# Patient Record
Sex: FEMALE | Race: OTHER | Employment: OTHER | URBAN - METROPOLITAN AREA
[De-identification: names, ages, dates, MRNs, and addresses within clinical notes are randomized per-mention and may not be internally consistent; named-entity substitution may affect disease eponyms.]

---

## 2017-03-18 RX ORDER — LEVOTHYROXINE SODIUM 0.05 MG/1
TABLET ORAL
Qty: 30 TABLET | Refills: 5 | Status: SHIPPED | OUTPATIENT
Start: 2017-03-18 | End: 2017-10-26

## 2017-04-26 ENCOUNTER — APPOINTMENT (OUTPATIENT)
Dept: HEMATOLOGY/ONCOLOGY | Facility: HOSPITAL | Age: 52
End: 2017-04-26
Attending: INTERNAL MEDICINE
Payer: COMMERCIAL

## 2017-04-27 ENCOUNTER — OFFICE VISIT (OUTPATIENT)
Dept: HEMATOLOGY/ONCOLOGY | Facility: HOSPITAL | Age: 52
End: 2017-04-27
Attending: INTERNAL MEDICINE
Payer: COMMERCIAL

## 2017-04-27 VITALS
OXYGEN SATURATION: 98 % | DIASTOLIC BLOOD PRESSURE: 75 MMHG | SYSTOLIC BLOOD PRESSURE: 133 MMHG | HEIGHT: 65 IN | RESPIRATION RATE: 20 BRPM | WEIGHT: 273.81 LBS | HEART RATE: 59 BPM | TEMPERATURE: 98 F | BODY MASS INDEX: 45.62 KG/M2

## 2017-04-27 DIAGNOSIS — D50.9 IRON DEFICIENCY ANEMIA, UNSPECIFIED IRON DEFICIENCY ANEMIA TYPE: ICD-10-CM

## 2017-04-27 PROCEDURE — 99214 OFFICE O/P EST MOD 30 MIN: CPT | Performed by: INTERNAL MEDICINE

## 2017-04-27 NOTE — PROGRESS NOTES
Cancer Center Progress Note    Patient Name: Sd Vicente   YOB: 1965   Medical Record Number: [id number]   CSN: [id number]   Attending Physician: Mulugeta Christian M.D.    Referring Physician: Danielle Briones MD      Date of Visit: 4/27/2017 supple. Lymphatics: There is no palpable lymphadenopathy   Chest: Clear to auscultation. Heart: Regular rate and rhythm. Abdomen: Soft, non tender with good bowel sounds. No hepatosplenomegaly. No palpable mass.   Extremities: Pedal pulses are present

## 2017-10-26 RX ORDER — LEVOTHYROXINE SODIUM 0.05 MG/1
TABLET ORAL
Qty: 30 TABLET | Refills: 1 | Status: SHIPPED | OUTPATIENT
Start: 2017-10-26 | End: 2018-02-08

## 2017-11-01 NOTE — TELEPHONE ENCOUNTER
Patient no longer uses [facility] as a pharmacy and was filled on the October 26th by doctor. Refilling it at [facility].

## 2018-02-08 ENCOUNTER — OFFICE VISIT (OUTPATIENT)
Dept: INTERNAL MEDICINE CLINIC | Facility: CLINIC | Age: 53
End: 2018-02-08

## 2018-02-08 VITALS
WEIGHT: 293 LBS | TEMPERATURE: 99 F | SYSTOLIC BLOOD PRESSURE: 130 MMHG | DIASTOLIC BLOOD PRESSURE: 78 MMHG | HEIGHT: 65 IN | RESPIRATION RATE: 14 BRPM | HEART RATE: 67 BPM | OXYGEN SATURATION: 98 % | BODY MASS INDEX: 48.82 KG/M2

## 2018-02-08 DIAGNOSIS — D50.9 IRON DEFICIENCY ANEMIA, UNSPECIFIED IRON DEFICIENCY ANEMIA TYPE: ICD-10-CM

## 2018-02-08 DIAGNOSIS — E66.01 MORBID OBESITY WITH BMI OF 50.0-59.9, ADULT (HCC): ICD-10-CM

## 2018-02-08 DIAGNOSIS — E03.9 HYPOTHYROIDISM, UNSPECIFIED TYPE: Primary | ICD-10-CM

## 2018-02-08 DIAGNOSIS — Z12.31 SCREENING MAMMOGRAM, ENCOUNTER FOR: ICD-10-CM

## 2018-02-08 DIAGNOSIS — G47.33 OSA (OBSTRUCTIVE SLEEP APNEA): ICD-10-CM

## 2018-02-08 DIAGNOSIS — R73.03 PREDIABETES: ICD-10-CM

## 2018-02-08 PROCEDURE — 99214 OFFICE O/P EST MOD 30 MIN: CPT | Performed by: INTERNAL MEDICINE

## 2018-02-08 RX ORDER — PHENTERMINE HYDROCHLORIDE 37.5 MG/1
37.5 TABLET ORAL
Qty: 30 TABLET | Refills: 0 | Status: SHIPPED | OUTPATIENT
Start: 2018-02-08 | End: 2018-03-09

## 2018-02-08 RX ORDER — LEVOTHYROXINE SODIUM 0.05 MG/1
TABLET ORAL
Qty: 90 TABLET | Refills: 1 | Status: SHIPPED | OUTPATIENT
Start: 2018-02-08 | End: 2018-08-30

## 2018-02-08 NOTE — PROGRESS NOTES
Patient presents with: Other: multiple medical concerns      HPI: The pt presents today for eval of multiple concerns as follows:    1. Hypothyroidism - Stable. Due for updated TFTs. She's compliant w/ prescription medication. No new issues.   2. Sienna Bound oz  04/27/17 : 273 lb 12.8 oz  11/16/16 : 221 lb 6.4 oz  11/09/16 : 230 lb 9.6 oz  11/02/16 : 234 lb  10/27/16 : 227 lb  Gen - A&Ox3, NAD, morbidly obese  HEENT - PERRL, EOMI, OP is clear; TMs clear B  Neck - supple, no JVD, no thyromegaly  Lungs - CTAB  C mouth every morning before breakfast.           Imaging & Consults:  [facility] WM 2D+3D SCREENING BILAT (CPT=77067/00258)

## 2018-02-12 ENCOUNTER — HOSPITAL ENCOUNTER (OUTPATIENT)
Dept: MAMMOGRAPHY | Age: 53
Discharge: HOME | End: 2018-02-12
Attending: INTERNAL MEDICINE
Payer: COMMERCIAL

## 2018-02-12 ENCOUNTER — LAB ENCOUNTER (OUTPATIENT)
Dept: LAB | Age: 53
End: 2018-02-12
Attending: INTERNAL MEDICINE
Payer: COMMERCIAL

## 2018-02-12 DIAGNOSIS — R73.03 PREDIABETES: ICD-10-CM

## 2018-02-12 DIAGNOSIS — D50.9 IRON DEFICIENCY ANEMIA, UNSPECIFIED IRON DEFICIENCY ANEMIA TYPE: ICD-10-CM

## 2018-02-12 DIAGNOSIS — E66.01 MORBID OBESITY WITH BMI OF 50.0-59.9, ADULT (HCC): ICD-10-CM

## 2018-02-12 DIAGNOSIS — Z12.31 SCREENING MAMMOGRAM, ENCOUNTER FOR: ICD-10-CM

## 2018-02-12 DIAGNOSIS — E03.9 HYPOTHYROIDISM, UNSPECIFIED TYPE: ICD-10-CM

## 2018-02-12 LAB
25-HYDROXYVITAMIN D (TOTAL): 21.6 NG/ML (ref 30–100)
ALBUMIN SERPL-MCNC: 3.4 G/DL (ref 3.5–4.8)
ALP LIVER SERPL-CCNC: 113 U/L (ref 41–108)
ALT SERPL-CCNC: 19 U/L (ref 14–54)
AST SERPL-CCNC: 14 U/L (ref 15–41)
BASOPHILS # BLD AUTO: 0.04 X10(3) UL (ref 0–0.1)
BASOPHILS NFR BLD AUTO: 0.4 %
BILIRUB SERPL-MCNC: 0.8 MG/DL (ref 0.1–2)
BUN BLD-MCNC: 11 MG/DL (ref 8–20)
CALCIUM BLD-MCNC: 9.2 MG/DL (ref 8.3–10.3)
CHLORIDE: 103 MMOL/L (ref 101–111)
CHOLEST SMN-MCNC: 221 MG/DL (ref ?–200)
CO2: 26 MMOL/L (ref 22–32)
CREAT BLD-MCNC: 0.66 MG/DL (ref 0.55–1.02)
DEPRECATED HBV CORE AB SER IA-ACNC: 30 NG/ML (ref 10–291)
EOSINOPHIL # BLD AUTO: 0.28 X10(3) UL (ref 0–0.3)
EOSINOPHIL NFR BLD AUTO: 2.9 %
ERYTHROCYTE [DISTWIDTH] IN BLOOD BY AUTOMATED COUNT: 13.9 % (ref 11.5–16)
EST. AVERAGE GLUCOSE BLD GHB EST-MCNC: 174 MG/DL (ref 68–126)
FOLATE (FOLIC ACID), SERUM: 23.3 NG/ML (ref 8.7–24)
FREE T4: 1.2 NG/DL (ref 0.9–1.8)
GLUCOSE BLD-MCNC: 144 MG/DL (ref 70–99)
HAV AB SERPL IA-ACNC: 718 PG/ML (ref 193–986)
HBA1C MFR BLD HPLC: 7.7 % (ref ?–5.7)
HCT VFR BLD AUTO: 42.8 % (ref 34–50)
HDLC SERPL-MCNC: 64 MG/DL (ref 45–?)
HDLC SERPL: 3.45 {RATIO} (ref ?–4.44)
HGB BLD-MCNC: 13.9 G/DL (ref 12–16)
IMMATURE GRANULOCYTE COUNT: 0.02 X10(3) UL (ref 0–1)
IMMATURE GRANULOCYTE RATIO %: 0.2 %
IRON SATURATION: 23 % (ref 13–45)
IRON: 92 UG/DL (ref 28–170)
LDLC SERPL CALC-MCNC: 141 MG/DL (ref ?–130)
LYMPHOCYTES # BLD AUTO: 2.07 X10(3) UL (ref 0.9–4)
LYMPHOCYTES NFR BLD AUTO: 21.3 %
M PROTEIN MFR SERPL ELPH: 7.8 G/DL (ref 6.1–8.3)
MCH RBC QN AUTO: 31.2 PG (ref 27–33.2)
MCHC RBC AUTO-ENTMCNC: 32.5 G/DL (ref 31–37)
MCV RBC AUTO: 96 FL (ref 81–100)
MONOCYTES # BLD AUTO: 0.64 X10(3) UL (ref 0.1–1)
MONOCYTES NFR BLD AUTO: 6.6 %
NEUTROPHIL ABS PRELIM: 6.66 X10 (3) UL (ref 1.3–6.7)
NEUTROPHILS # BLD AUTO: 6.66 X10(3) UL (ref 1.3–6.7)
NEUTROPHILS NFR BLD AUTO: 68.6 %
NONHDLC SERPL-MCNC: 157 MG/DL (ref ?–130)
PLATELET # BLD AUTO: 320 10(3)UL (ref 150–450)
POTASSIUM SERPL-SCNC: 3.9 MMOL/L (ref 3.6–5.1)
RBC # BLD AUTO: 4.46 X10(6)UL (ref 3.8–5.1)
RED CELL DISTRIBUTION WIDTH-SD: 49.3 FL (ref 35.1–46.3)
SODIUM SERPL-SCNC: 136 MMOL/L (ref 136–144)
TOTAL IRON BINDING CAPACITY: 407 UG/DL (ref 298–536)
TRANSFERRIN: 273 MG/DL (ref 200–360)
TRIGL SERPL-MCNC: 82 MG/DL (ref ?–150)
TSI SER-ACNC: 4.84 MIU/ML (ref 0.35–5.5)
VLDLC SERPL CALC-MCNC: 16 MG/DL (ref 5–40)
WBC # BLD AUTO: 9.7 X10(3) UL (ref 4–13)

## 2018-02-12 PROCEDURE — 82607 VITAMIN B-12: CPT | Performed by: INTERNAL MEDICINE

## 2018-02-12 PROCEDURE — 83550 IRON BINDING TEST: CPT | Performed by: INTERNAL MEDICINE

## 2018-02-12 PROCEDURE — 82728 ASSAY OF FERRITIN: CPT | Performed by: INTERNAL MEDICINE

## 2018-02-12 PROCEDURE — 80050 GENERAL HEALTH PANEL: CPT | Performed by: INTERNAL MEDICINE

## 2018-02-12 PROCEDURE — 82746 ASSAY OF FOLIC ACID SERUM: CPT | Performed by: INTERNAL MEDICINE

## 2018-02-12 PROCEDURE — 83540 ASSAY OF IRON: CPT | Performed by: INTERNAL MEDICINE

## 2018-02-12 PROCEDURE — 36415 COLL VENOUS BLD VENIPUNCTURE: CPT | Performed by: INTERNAL MEDICINE

## 2018-02-12 PROCEDURE — 82306 VITAMIN D 25 HYDROXY: CPT | Performed by: INTERNAL MEDICINE

## 2018-02-12 PROCEDURE — 80061 LIPID PANEL: CPT | Performed by: INTERNAL MEDICINE

## 2018-02-12 PROCEDURE — 84439 ASSAY OF FREE THYROXINE: CPT | Performed by: INTERNAL MEDICINE

## 2018-02-12 PROCEDURE — 83036 HEMOGLOBIN GLYCOSYLATED A1C: CPT | Performed by: INTERNAL MEDICINE

## 2018-02-12 PROCEDURE — 77067 SCR MAMMO BI INCL CAD: CPT | Performed by: INTERNAL MEDICINE

## 2018-02-12 PROCEDURE — 77063 BREAST TOMOSYNTHESIS BI: CPT | Performed by: INTERNAL MEDICINE

## 2018-02-14 DIAGNOSIS — E11.65 UNCONTROLLED TYPE 2 DIABETES MELLITUS WITH HYPERGLYCEMIA, WITHOUT LONG-TERM CURRENT USE OF INSULIN (HCC): Primary | ICD-10-CM

## 2018-02-14 DIAGNOSIS — E78.00 HYPERCHOLESTEROLEMIA: ICD-10-CM

## 2018-02-14 PROBLEM — E55.9 VITAMIN D INSUFFICIENCY: Status: ACTIVE | Noted: 2018-02-14

## 2018-02-14 RX ORDER — ATORVASTATIN CALCIUM 40 MG/1
40 TABLET, FILM COATED ORAL NIGHTLY
Qty: 30 TABLET | Refills: 5 | Status: SHIPPED | OUTPATIENT
Start: 2018-02-14 | End: 2018-08-13

## 2018-02-14 NOTE — PROGRESS NOTES
New scripts sent to pharmacy today:    1. Metformin 1000 mg BID for dx of uncontrolled DM2 w/ hyperglycemia and w/o insulin. 2. Atorvastatin 40 mg daily for dx of hypercholesterolemia. Stef Emery.  Wen Skaggs MD  Diplomate, [place] Board of Internal Medicine

## 2018-02-28 ENCOUNTER — TELEPHONE (OUTPATIENT)
Dept: INTERNAL MEDICINE CLINIC | Facility: CLINIC | Age: 53
End: 2018-02-28

## 2018-03-09 ENCOUNTER — OFFICE VISIT (OUTPATIENT)
Dept: INTERNAL MEDICINE CLINIC | Facility: CLINIC | Age: 53
End: 2018-03-09

## 2018-03-09 VITALS
HEIGHT: 65 IN | WEIGHT: 293 LBS | HEART RATE: 78 BPM | DIASTOLIC BLOOD PRESSURE: 82 MMHG | TEMPERATURE: 98 F | SYSTOLIC BLOOD PRESSURE: 128 MMHG | BODY MASS INDEX: 48.82 KG/M2 | RESPIRATION RATE: 16 BRPM

## 2018-03-09 DIAGNOSIS — E66.01 MORBID OBESITY WITH BMI OF 50.0-59.9, ADULT (HCC): Primary | ICD-10-CM

## 2018-03-09 DIAGNOSIS — Z51.81 THERAPEUTIC DRUG MONITORING: ICD-10-CM

## 2018-03-09 PROCEDURE — 99213 OFFICE O/P EST LOW 20 MIN: CPT | Performed by: INTERNAL MEDICINE

## 2018-03-09 RX ORDER — PHENTERMINE HYDROCHLORIDE 37.5 MG/1
37.5 TABLET ORAL
Qty: 30 TABLET | Refills: 0 | Status: SHIPPED | OUTPATIENT
Start: 2018-03-09 | End: 2018-04-14

## 2018-03-09 RX ORDER — BUPROPION HYDROCHLORIDE 150 MG/1
150 TABLET, EXTENDED RELEASE ORAL 2 TIMES DAILY
Qty: 60 TABLET | Refills: 0 | Status: SHIPPED | OUTPATIENT
Start: 2018-03-09 | End: 2018-04-14

## 2018-03-09 NOTE — PROGRESS NOTES
Patient presents with:  Weight Check: 1 month follow up        HPI: The pt presents today for physician-supervised medical weight loss programming and assessment for the diagnosis of overweight and/or obesity status.   Has been on FDA-approved prescription mood/affect      A/P:  Morbid obesity with bmi of 50.0-59.9, adult (hcc)  (primary encounter diagnosis)  Therapeutic drug monitoring    1. Continue FDA-approved prescription medication for the treatment of obesity. 2. Prescription medication refill given.

## 2018-04-14 DIAGNOSIS — E66.01 MORBID OBESITY WITH BMI OF 50.0-59.9, ADULT (HCC): ICD-10-CM

## 2018-04-14 DIAGNOSIS — Z51.81 THERAPEUTIC DRUG MONITORING: ICD-10-CM

## 2018-04-16 RX ORDER — PHENTERMINE HYDROCHLORIDE 37.5 MG/1
TABLET ORAL
Qty: 30 TABLET | Refills: 0 | Status: SHIPPED | OUTPATIENT
Start: 2018-04-16 | End: 2018-06-04

## 2018-04-16 RX ORDER — BUPROPION HYDROCHLORIDE 150 MG/1
TABLET, EXTENDED RELEASE ORAL
Qty: 60 TABLET | Refills: 0 | Status: SHIPPED | OUTPATIENT
Start: 2018-04-16 | End: 2018-06-04

## 2018-04-16 NOTE — TELEPHONE ENCOUNTER
Refill requested: Bupropion + Phentermine     Failed protocol - No protocol     Last refill:Phentermine 3/9/18 #30 NR + Bupropion 3/9/18 #60 NR    Relevant Labs: NA  Last OV / RTC advised: 3/9/18 RTC in 1 month  Appt Scheduled: Yes  Your appointments     D

## 2018-04-17 ENCOUNTER — TELEPHONE (OUTPATIENT)
Dept: INTERNAL MEDICINE CLINIC | Facility: CLINIC | Age: 53
End: 2018-04-17

## 2018-04-26 ENCOUNTER — OFFICE VISIT (OUTPATIENT)
Dept: INTERNAL MEDICINE CLINIC | Facility: CLINIC | Age: 53
End: 2018-04-26

## 2018-04-26 VITALS
WEIGHT: 293 LBS | BODY MASS INDEX: 48.82 KG/M2 | HEART RATE: 80 BPM | RESPIRATION RATE: 20 BRPM | HEIGHT: 65 IN | SYSTOLIC BLOOD PRESSURE: 122 MMHG | DIASTOLIC BLOOD PRESSURE: 84 MMHG | TEMPERATURE: 98 F

## 2018-04-26 DIAGNOSIS — E66.01 MORBID OBESITY WITH BMI OF 50.0-59.9, ADULT (HCC): Primary | ICD-10-CM

## 2018-04-26 DIAGNOSIS — Z51.81 THERAPEUTIC DRUG MONITORING: ICD-10-CM

## 2018-04-26 PROCEDURE — 99213 OFFICE O/P EST LOW 20 MIN: CPT | Performed by: INTERNAL MEDICINE

## 2018-04-26 RX ORDER — PHENTERMINE HYDROCHLORIDE 37.5 MG/1
TABLET ORAL
Qty: 30 TABLET | Refills: 0 | Status: CANCELLED | OUTPATIENT
Start: 2018-04-26

## 2018-04-26 NOTE — PROGRESS NOTES
Patient presents with:  Weight Check: 1-month f/u MD-supervised medical weight loss programming       HPI: The pt presents today for physician-supervised medical weight loss programming and assessment for the diagnosis of overweight and/or obesity status. 90 tablet, Rfl: 1  •  ferrous sulfate 325 (65 FE) MG Oral Tab EC, Take 325 mg by mouth daily with breakfast., Disp: , Rfl:     Smoking status: Never Smoker                                                              Smokeless tobacco: Never Used

## 2018-05-17 DIAGNOSIS — E11.65 UNCONTROLLED TYPE 2 DIABETES MELLITUS WITH HYPERGLYCEMIA, WITHOUT LONG-TERM CURRENT USE OF INSULIN (HCC): ICD-10-CM

## 2018-05-17 NOTE — TELEPHONE ENCOUNTER
Refill requested: Metformin 1000 mg     Failed protocol - Hemoglobin A1C #      Last refill: 2/14/18 #60 5R  Relevant Labs: HA1C on 2/12/18  Last OV / RTC advised: 4/26/18  RTC in 1 months  Appt Scheduled: yes  Your appointments     Date & Time Appointment

## 2018-05-21 NOTE — TELEPHONE ENCOUNTER
Refill requested: Phentermine 37.5 mg     Failedprotocol       Last refill: 4/16/18 #30 NR  Relevant Labs: NA   Last OV / RTC advised: 4/26/18 RTC in 1 month  Appt Scheduled: yes  Your appointments     Date & Time Appointment Department [facility])    Jun 04, 2018 10:30 AM CDT Exam - Established Patient with Juvencio Multani MD [address] [facility] [place] [facility])    Jul 26, 2018  9:30 AM CDT Exam - Established Patient with Juvencio Multani MD [address] [facility]        [address] [facility] [facility] [place] [address] [address] [phone]

## 2018-06-04 ENCOUNTER — OFFICE VISIT (OUTPATIENT)
Dept: INTERNAL MEDICINE CLINIC | Facility: CLINIC | Age: 53
End: 2018-06-04

## 2018-06-04 VITALS
RESPIRATION RATE: 16 BRPM | SYSTOLIC BLOOD PRESSURE: 124 MMHG | TEMPERATURE: 99 F | DIASTOLIC BLOOD PRESSURE: 80 MMHG | HEART RATE: 64 BPM | HEIGHT: 65 IN | WEIGHT: 293 LBS | BODY MASS INDEX: 48.82 KG/M2

## 2018-06-04 DIAGNOSIS — Z51.81 THERAPEUTIC DRUG MONITORING: ICD-10-CM

## 2018-06-04 DIAGNOSIS — E66.01 MORBID OBESITY WITH BMI OF 50.0-59.9, ADULT (HCC): Primary | ICD-10-CM

## 2018-06-04 PROCEDURE — 99213 OFFICE O/P EST LOW 20 MIN: CPT | Performed by: INTERNAL MEDICINE

## 2018-06-04 RX ORDER — BUPROPION HYDROCHLORIDE 150 MG/1
TABLET, EXTENDED RELEASE ORAL
Qty: 60 TABLET | Refills: 0 | Status: SHIPPED | OUTPATIENT
Start: 2018-06-04 | End: 2018-07-26

## 2018-06-04 RX ORDER — PHENTERMINE HYDROCHLORIDE 37.5 MG/1
TABLET ORAL
Qty: 30 TABLET | Refills: 0 | Status: SHIPPED | OUTPATIENT
Start: 2018-06-04 | End: 2018-07-26

## 2018-06-04 NOTE — PROGRESS NOTES
Patient presents with:  Weight Loss: follow up -6 lbs, feet pain when standing         HPI: The pt presents today for physician-supervised medical weight loss programming and assessment for the diagnosis of overweight and/or obesity status.   Has been on FD 90 tablet, Rfl: 1  •  ferrous sulfate 325 (65 FE) MG Oral Tab EC, Take 325 mg by mouth daily with breakfast., Disp: , Rfl:     Smoking status: Never Smoker                                                              Smokeless tobacco: Never Used

## 2018-07-26 ENCOUNTER — OFFICE VISIT (OUTPATIENT)
Dept: INTERNAL MEDICINE CLINIC | Facility: CLINIC | Age: 53
End: 2018-07-26
Payer: COMMERCIAL

## 2018-07-26 VITALS
DIASTOLIC BLOOD PRESSURE: 80 MMHG | WEIGHT: 292.25 LBS | TEMPERATURE: 98 F | BODY MASS INDEX: 48.69 KG/M2 | RESPIRATION RATE: 16 BRPM | HEIGHT: 65 IN | SYSTOLIC BLOOD PRESSURE: 133 MMHG | HEART RATE: 80 BPM

## 2018-07-26 DIAGNOSIS — Z51.81 THERAPEUTIC DRUG MONITORING: ICD-10-CM

## 2018-07-26 DIAGNOSIS — I83.813 VARICOSE VEINS OF BOTH LOWER EXTREMITIES WITH PAIN: ICD-10-CM

## 2018-07-26 DIAGNOSIS — E66.01 MORBID OBESITY WITH BMI OF 45.0-49.9, ADULT (HCC): Primary | ICD-10-CM

## 2018-07-26 PROCEDURE — 99213 OFFICE O/P EST LOW 20 MIN: CPT | Performed by: INTERNAL MEDICINE

## 2018-07-26 RX ORDER — PHENTERMINE HYDROCHLORIDE 37.5 MG/1
TABLET ORAL
Qty: 30 TABLET | Refills: 0 | Status: SHIPPED | OUTPATIENT
Start: 2018-07-26 | End: 2018-08-30

## 2018-07-26 RX ORDER — BUPROPION HYDROCHLORIDE 150 MG/1
TABLET, EXTENDED RELEASE ORAL
Qty: 60 TABLET | Refills: 0 | Status: SHIPPED | OUTPATIENT
Start: 2018-07-26 | End: 2018-08-30

## 2018-07-26 NOTE — PROGRESS NOTES
Patient presents with:  Weight Check       HPI: The pt presents today for physician-supervised medical weight loss programming and assessment for the diagnosis of overweight and/or obesity status.   Has been on FDA-approved prescription medication with Phen Disp: 90 tablet, Rfl: 1  •  ferrous sulfate 325 (65 FE) MG Oral Tab EC, Take 325 mg by mouth daily with breakfast., Disp: , Rfl:     Smoking status: Never Smoker                                                              Smokeless tobacco: Never Used LOSS           Imaging & Consults:  SURGERY - INTERNAL

## 2018-08-13 DIAGNOSIS — E78.00 HYPERCHOLESTEROLEMIA: ICD-10-CM

## 2018-08-13 RX ORDER — ATORVASTATIN CALCIUM 40 MG/1
40 TABLET, FILM COATED ORAL NIGHTLY
Qty: 90 TABLET | Refills: 1 | Status: SHIPPED | OUTPATIENT
Start: 2018-08-13 | End: 2019-01-22

## 2018-08-13 NOTE — TELEPHONE ENCOUNTER
Refill requested: Atorvastatin 40 mg     Passed protocol      Last refill: 2/14/18 #30 5R    Relevant Labs: Lipid 2/12/18   Last OV / RTC advised: 7/26/18 MG RTC 1 month    Appt Scheduled:  yes  Your appointments     Date & Time Appointment Department [phone]

## 2018-08-17 DIAGNOSIS — E66.01 MORBID OBESITY WITH BMI OF 50.0-59.9, ADULT (HCC): ICD-10-CM

## 2018-08-17 DIAGNOSIS — Z51.81 THERAPEUTIC DRUG MONITORING: ICD-10-CM

## 2018-08-17 RX ORDER — BUPROPION HYDROCHLORIDE 150 MG/1
TABLET, EXTENDED RELEASE ORAL
Qty: 60 TABLET | Refills: 0 | Status: SHIPPED | OUTPATIENT
Start: 2018-08-17 | End: 2018-08-30

## 2018-08-17 NOTE — TELEPHONE ENCOUNTER
Bupropion  mg 1 tab bid filled 7-26-18 60 with 0 refills     LOV 7-26-18     RTC 1 month     Next apt 8-30-18

## 2018-08-30 ENCOUNTER — TELEPHONE (OUTPATIENT)
Dept: INTERNAL MEDICINE CLINIC | Facility: CLINIC | Age: 53
End: 2018-08-30

## 2018-08-30 ENCOUNTER — OFFICE VISIT (OUTPATIENT)
Dept: INTERNAL MEDICINE CLINIC | Facility: CLINIC | Age: 53
End: 2018-08-30
Payer: COMMERCIAL

## 2018-08-30 VITALS
SYSTOLIC BLOOD PRESSURE: 130 MMHG | RESPIRATION RATE: 16 BRPM | HEART RATE: 78 BPM | DIASTOLIC BLOOD PRESSURE: 70 MMHG | WEIGHT: 293 LBS | TEMPERATURE: 98 F | BODY MASS INDEX: 48.23 KG/M2 | HEIGHT: 65.5 IN | OXYGEN SATURATION: 97 %

## 2018-08-30 DIAGNOSIS — E11.65 UNCONTROLLED TYPE 2 DIABETES MELLITUS WITH HYPERGLYCEMIA, WITHOUT LONG-TERM CURRENT USE OF INSULIN (HCC): ICD-10-CM

## 2018-08-30 DIAGNOSIS — E66.01 MORBID OBESITY WITH BMI OF 45.0-49.9, ADULT (HCC): Primary | ICD-10-CM

## 2018-08-30 DIAGNOSIS — Z51.81 THERAPEUTIC DRUG MONITORING: ICD-10-CM

## 2018-08-30 DIAGNOSIS — E03.9 HYPOTHYROIDISM, UNSPECIFIED TYPE: ICD-10-CM

## 2018-08-30 DIAGNOSIS — R60.9 PERIPHERAL EDEMA: ICD-10-CM

## 2018-08-30 PROCEDURE — 99214 OFFICE O/P EST MOD 30 MIN: CPT | Performed by: INTERNAL MEDICINE

## 2018-08-30 RX ORDER — BUPROPION HYDROCHLORIDE 150 MG/1
TABLET, EXTENDED RELEASE ORAL
Qty: 60 TABLET | Refills: 0 | Status: SHIPPED | OUTPATIENT
Start: 2018-08-30 | End: 2018-09-17

## 2018-08-30 RX ORDER — PHENTERMINE HYDROCHLORIDE 37.5 MG/1
TABLET ORAL
Qty: 30 TABLET | Refills: 0 | Status: SHIPPED | OUTPATIENT
Start: 2018-08-30 | End: 2018-09-17

## 2018-08-30 RX ORDER — LIRAGLUTIDE 6 MG/ML
3 INJECTION, SOLUTION SUBCUTANEOUS DAILY
Qty: 15 ML | Refills: 0 | Status: SHIPPED | OUTPATIENT
Start: 2018-08-30 | End: 2018-08-30

## 2018-08-30 RX ORDER — FUROSEMIDE 20 MG/1
20 TABLET ORAL DAILY
Qty: 7 TABLET | Refills: 0 | Status: SHIPPED | OUTPATIENT
Start: 2018-08-30 | End: 2018-09-17

## 2018-08-30 RX ORDER — LEVOTHYROXINE SODIUM 0.05 MG/1
TABLET ORAL
Qty: 90 TABLET | Refills: 1 | Status: SHIPPED | OUTPATIENT
Start: 2018-08-30 | End: 2019-01-22

## 2018-08-30 NOTE — TELEPHONE ENCOUNTER
Received PA request for Ozempic. JASMIN GUTIERREZ (Denise: [id number])  Your information has been submitted to [employer]. [employer] is reviewing the PA request and you will receive an electronic response.  You may check for the updated outcome later by sudha

## 2018-08-30 NOTE — PATIENT INSTRUCTIONS
Mel,    1. Vamos a seguir con la PHENTERMINE y la BUPROPION. 2. La medicina nueve se llama OZEMPIC y hay que tomarla tonny vez cada [place]. La fuerza es 0.25 mg.  3. Nos vemos en 1 mes.     Kind regards,  Dr. Claus Moise

## 2018-08-31 NOTE — PROGRESS NOTES
Patient presents with:  Weight Check: patient is here for 1 month follow up on weight      HPI: Corinne Michael presents today primarily for weight loss follow up.   She's been on Phentermine and Bupropion, but her weight loss has stopped since the last month, plus s weight loss as well). 2. Peripheral edema - Start 1-week trial of Furosemide. 3. Uncontrolled DM2 w/ hyperglycemia - Start Ozempic. Teaching given. Sample given. Continue Metformin as scheduled. 4. HypoTSH - Stable on prescription medication.   Refill

## 2018-09-17 ENCOUNTER — LAB ENCOUNTER (OUTPATIENT)
Dept: LAB | Age: 53
End: 2018-09-17
Attending: INTERNAL MEDICINE
Payer: COMMERCIAL

## 2018-09-17 ENCOUNTER — OFFICE VISIT (OUTPATIENT)
Dept: INTERNAL MEDICINE CLINIC | Facility: CLINIC | Age: 53
End: 2018-09-17
Payer: COMMERCIAL

## 2018-09-17 VITALS
HEIGHT: 65.5 IN | WEIGHT: 293 LBS | DIASTOLIC BLOOD PRESSURE: 80 MMHG | RESPIRATION RATE: 12 BRPM | BODY MASS INDEX: 48.23 KG/M2 | HEART RATE: 78 BPM | SYSTOLIC BLOOD PRESSURE: 140 MMHG | TEMPERATURE: 98 F

## 2018-09-17 DIAGNOSIS — Z00.00 ROUTINE GENERAL MEDICAL EXAMINATION AT A HEALTH CARE FACILITY: ICD-10-CM

## 2018-09-17 DIAGNOSIS — Z12.4 SCREENING FOR CERVICAL CANCER: ICD-10-CM

## 2018-09-17 DIAGNOSIS — Z00.00 ROUTINE GENERAL MEDICAL EXAMINATION AT A HEALTH CARE FACILITY: Primary | ICD-10-CM

## 2018-09-17 PROBLEM — I83.893 VARICOSE VEINS OF BOTH LOWER EXTREMITIES WITH COMPLICATIONS: Status: ACTIVE | Noted: 2018-09-17

## 2018-09-17 PROBLEM — IMO0001 CLASS 3 OBESITY DUE TO EXCESS CALORIES WITH BODY MASS INDEX (BMI) OF 45.0 TO 49.9 IN ADULT: Status: ACTIVE | Noted: 2018-02-08

## 2018-09-17 LAB
ALBUMIN SERPL-MCNC: 3.1 G/DL (ref 3.5–4.8)
ALBUMIN/GLOB SERPL: 0.7 {RATIO} (ref 1–2)
ALP LIVER SERPL-CCNC: 109 U/L (ref 41–108)
ALT SERPL-CCNC: 19 U/L (ref 14–54)
ANION GAP SERPL CALC-SCNC: 7 MMOL/L (ref 0–18)
AST SERPL-CCNC: 16 U/L (ref 15–41)
BASOPHILS # BLD AUTO: 0.04 X10(3) UL (ref 0–0.1)
BASOPHILS NFR BLD AUTO: 0.6 %
BILIRUB SERPL-MCNC: 0.5 MG/DL (ref 0.1–2)
BILIRUB UR QL STRIP.AUTO: NEGATIVE
BUN BLD-MCNC: 12 MG/DL (ref 8–20)
BUN/CREAT SERPL: 19.4 (ref 10–20)
CALCIUM BLD-MCNC: 8.5 MG/DL (ref 8.3–10.3)
CHLORIDE SERPL-SCNC: 106 MMOL/L (ref 101–111)
CHOLEST SMN-MCNC: 185 MG/DL (ref ?–200)
CO2 SERPL-SCNC: 26 MMOL/L (ref 22–32)
COLOR UR AUTO: YELLOW
CREAT BLD-MCNC: 0.62 MG/DL (ref 0.55–1.02)
CREAT UR-SCNC: 68 MG/DL
EOSINOPHIL # BLD AUTO: 0.35 X10(3) UL (ref 0–0.3)
EOSINOPHIL NFR BLD AUTO: 5.1 %
ERYTHROCYTE [DISTWIDTH] IN BLOOD BY AUTOMATED COUNT: 14.8 % (ref 11.5–16)
EST. AVERAGE GLUCOSE BLD GHB EST-MCNC: 157 MG/DL (ref 68–126)
GLOBULIN PLAS-MCNC: 4.2 G/DL (ref 2.5–4)
GLUCOSE BLD-MCNC: 127 MG/DL (ref 70–99)
GLUCOSE UR STRIP.AUTO-MCNC: NEGATIVE MG/DL
HBA1C MFR BLD HPLC: 7.1 % (ref ?–5.7)
HCT VFR BLD AUTO: 38.4 % (ref 34–50)
HDLC SERPL-MCNC: 51 MG/DL (ref 40–59)
HGB BLD-MCNC: 12.2 G/DL (ref 12–16)
IMMATURE GRANULOCYTE COUNT: 0.01 X10(3) UL (ref 0–1)
IMMATURE GRANULOCYTE RATIO %: 0.1 %
KETONES UR STRIP.AUTO-MCNC: NEGATIVE MG/DL
LDLC SERPL CALC-MCNC: 116 MG/DL (ref ?–100)
LYMPHOCYTES # BLD AUTO: 1.8 X10(3) UL (ref 0.9–4)
LYMPHOCYTES NFR BLD AUTO: 26 %
M PROTEIN MFR SERPL ELPH: 7.3 G/DL (ref 6.1–8.3)
MCH RBC QN AUTO: 30.4 PG (ref 27–33.2)
MCHC RBC AUTO-ENTMCNC: 31.8 G/DL (ref 31–37)
MCV RBC AUTO: 95.8 FL (ref 81–100)
MICROALBUMIN UR-MCNC: 1.1 MG/DL
MICROALBUMIN/CREAT 24H UR-RTO: 16.2 UG/MG (ref ?–30)
MONOCYTES # BLD AUTO: 0.68 X10(3) UL (ref 0.1–1)
MONOCYTES NFR BLD AUTO: 9.8 %
NEUTROPHIL ABS PRELIM: 4.04 X10 (3) UL (ref 1.3–6.7)
NEUTROPHILS # BLD AUTO: 4.04 X10(3) UL (ref 1.3–6.7)
NEUTROPHILS NFR BLD AUTO: 58.4 %
NITRITE UR QL STRIP.AUTO: NEGATIVE
NONHDLC SERPL-MCNC: 134 MG/DL (ref ?–130)
OSMOLALITY SERPL CALC.SUM OF ELEC: 289 MOSM/KG (ref 275–295)
PH UR STRIP.AUTO: 8 [PH] (ref 4.5–8)
PLATELET # BLD AUTO: 339 10(3)UL (ref 150–450)
POTASSIUM SERPL-SCNC: 4.3 MMOL/L (ref 3.6–5.1)
PROT UR STRIP.AUTO-MCNC: NEGATIVE MG/DL
RBC # BLD AUTO: 4.01 X10(6)UL (ref 3.8–5.1)
RBC UR QL AUTO: NEGATIVE
RED CELL DISTRIBUTION WIDTH-SD: 52.1 FL (ref 35.1–46.3)
SODIUM SERPL-SCNC: 139 MMOL/L (ref 136–144)
SP GR UR STRIP.AUTO: 1.01 (ref 1–1.03)
T4 FREE SERPL-MCNC: 1.2 NG/DL (ref 0.9–1.8)
TRIGL SERPL-MCNC: 88 MG/DL (ref 30–149)
TSI SER-ACNC: 3.3 MIU/ML (ref 0.35–5.5)
UROBILINOGEN UR STRIP.AUTO-MCNC: <2 MG/DL
VIT D+METAB SERPL-MCNC: 20.1 NG/ML (ref 30–100)
VLDLC SERPL CALC-MCNC: 18 MG/DL (ref 0–30)
WBC # BLD AUTO: 6.9 X10(3) UL (ref 4–13)

## 2018-09-17 PROCEDURE — 80050 GENERAL HEALTH PANEL: CPT | Performed by: INTERNAL MEDICINE

## 2018-09-17 PROCEDURE — 81001 URINALYSIS AUTO W/SCOPE: CPT | Performed by: INTERNAL MEDICINE

## 2018-09-17 PROCEDURE — 80061 LIPID PANEL: CPT | Performed by: INTERNAL MEDICINE

## 2018-09-17 PROCEDURE — 83036 HEMOGLOBIN GLYCOSYLATED A1C: CPT | Performed by: INTERNAL MEDICINE

## 2018-09-17 PROCEDURE — 36415 COLL VENOUS BLD VENIPUNCTURE: CPT | Performed by: INTERNAL MEDICINE

## 2018-09-17 PROCEDURE — 82306 VITAMIN D 25 HYDROXY: CPT | Performed by: INTERNAL MEDICINE

## 2018-09-17 PROCEDURE — 99396 PREV VISIT EST AGE 40-64: CPT | Performed by: INTERNAL MEDICINE

## 2018-09-17 PROCEDURE — 82570 ASSAY OF URINE CREATININE: CPT | Performed by: INTERNAL MEDICINE

## 2018-09-17 PROCEDURE — 84439 ASSAY OF FREE THYROXINE: CPT | Performed by: INTERNAL MEDICINE

## 2018-09-17 PROCEDURE — 82043 UR ALBUMIN QUANTITATIVE: CPT | Performed by: INTERNAL MEDICINE

## 2018-09-17 PROCEDURE — 87624 HPV HI-RISK TYP POOLED RSLT: CPT | Performed by: INTERNAL MEDICINE

## 2018-09-17 RX ORDER — PHENTERMINE HYDROCHLORIDE 37.5 MG/1
TABLET ORAL
Qty: 30 TABLET | Refills: 0 | Status: SHIPPED | OUTPATIENT
Start: 2018-09-30 | End: 2018-10-23

## 2018-09-17 RX ORDER — BUPROPION HYDROCHLORIDE 150 MG/1
TABLET, EXTENDED RELEASE ORAL
Qty: 60 TABLET | Refills: 0 | Status: SHIPPED | OUTPATIENT
Start: 2018-09-30 | End: 2018-10-23

## 2018-09-17 RX ORDER — FUROSEMIDE 20 MG/1
20 TABLET ORAL
Qty: 90 TABLET | Refills: 0 | Status: SHIPPED | OUTPATIENT
Start: 2018-09-17 | End: 2019-01-22

## 2018-09-17 NOTE — PROGRESS NOTES
Patient presents with:  CPX: with pap/ fasting       HPI: Oneil Anaya presents today for WWE + pap. Doing well. She's compliant with all prescription medications.   She tells me that she was denied Ozempic from insurance which is strange to me as she's been on total) by mouth daily as needed (Leg Swelling). , Disp: 90 tablet, Rfl: 0  •  [START ON 9/30/2018] BuPROPion HCl ER, SR, 150 MG Oral Tablet 12 Hr, TAKE 1 TABLET BY MOUTH TWO TIMES A DAY FOR WEIGHT LOSS, Disp: 60 tablet, Rfl: 0  •  [START ON 9/30/2018] Phent monofilament/sensation of both feet is normal.  Pulsation pedal pulse exam of both lower legs/feet is normal as well.    Neuro - CNs 2-12 grossly intact, no focal deficits; 2+ DTRs  Psych - nl mood/affect  Pelvic: Exam supervised by BRENDEN, MA --> nl and Pap ta

## 2018-09-17 NOTE — TELEPHONE ENCOUNTER
Spoke to [employer] - state medication was denied on 9/2/18. States must try Metformin, I did inform her that pt has been on Metformin for a while now.  States that they need OV notes showing that pt has been taking Metformin in the past 90 days wit

## 2018-09-21 LAB — HPV I/H RISK 1 DNA SPEC QL NAA+PROBE: NEGATIVE

## 2018-09-24 ENCOUNTER — TELEPHONE (OUTPATIENT)
Dept: INTERNAL MEDICINE CLINIC | Facility: CLINIC | Age: 53
End: 2018-09-24

## 2018-09-24 DIAGNOSIS — E55.9 VITAMIN D DEFICIENCY: Primary | ICD-10-CM

## 2018-09-24 RX ORDER — ERGOCALCIFEROL 1.25 MG/1
50000 CAPSULE ORAL WEEKLY
Qty: 12 CAPSULE | Refills: 0 | Status: SHIPPED | OUTPATIENT
Start: 2018-09-24 | End: 2018-10-24

## 2018-09-24 NOTE — TELEPHONE ENCOUNTER
Orders signed. Yuri Franks. Raisa Ledesma MD  Diplomate, American Board of Internal Medicine  [address] [address] [address]  [address] [facility],[address], [address], [facility], [address] [place] [address]  T: [id number]; F: [address]

## 2018-10-08 ENCOUNTER — TELEPHONE (OUTPATIENT)
Dept: INTERNAL MEDICINE CLINIC | Facility: CLINIC | Age: 53
End: 2018-10-08

## 2018-10-08 NOTE — TELEPHONE ENCOUNTER
Refaxed notes from 6-4-18, 7-26-18, 8-30-18 and added recent OV 9-17-18. Papers on Dr. Mago andrews.

## 2018-10-23 ENCOUNTER — OFFICE VISIT (OUTPATIENT)
Dept: INTERNAL MEDICINE CLINIC | Facility: CLINIC | Age: 53
End: 2018-10-23
Payer: COMMERCIAL

## 2018-10-23 VITALS
RESPIRATION RATE: 18 BRPM | OXYGEN SATURATION: 98 % | SYSTOLIC BLOOD PRESSURE: 138 MMHG | TEMPERATURE: 99 F | BODY MASS INDEX: 49 KG/M2 | HEART RATE: 66 BPM | DIASTOLIC BLOOD PRESSURE: 72 MMHG | WEIGHT: 293 LBS

## 2018-10-23 DIAGNOSIS — E66.01 CLASS 3 SEVERE OBESITY DUE TO EXCESS CALORIES WITH SERIOUS COMORBIDITY AND BODY MASS INDEX (BMI) OF 45.0 TO 49.9 IN ADULT (HCC): Primary | ICD-10-CM

## 2018-10-23 PROCEDURE — 99213 OFFICE O/P EST LOW 20 MIN: CPT | Performed by: INTERNAL MEDICINE

## 2018-10-23 NOTE — PROGRESS NOTES
Patient presents with: Follow - Up      HPI: Ashley Car presents today for 1-month f/u MD-supervised weight loss programming. She'd been on Phentermine, Bupropion, and samples of Ozempic (she's diabetic as well, but insurance wouldn't cover a real script).   Bell Sas Encounters:  10/23/18 : (!) 301 lb  09/17/18 : 299 lb 8 oz  08/30/18 : (!) 303 lb  07/26/18 : 292 lb 4 oz  06/04/18 : (!) 305 lb 4 oz  04/26/18 : (!) 311 lb 8 oz  Gen - A&Ox3, NAD, morbidly obese  HEENT - PERRL, EOMI, OP is clear  Lungs - CTAB  CV - RRR, n

## 2019-01-22 ENCOUNTER — OFFICE VISIT (OUTPATIENT)
Dept: INTERNAL MEDICINE CLINIC | Facility: CLINIC | Age: 54
End: 2019-01-22
Payer: COMMERCIAL

## 2019-01-22 ENCOUNTER — LAB ENCOUNTER (OUTPATIENT)
Dept: LAB | Age: 54
End: 2019-01-22
Attending: INTERNAL MEDICINE
Payer: COMMERCIAL

## 2019-01-22 VITALS
BODY MASS INDEX: 47.65 KG/M2 | RESPIRATION RATE: 12 BRPM | TEMPERATURE: 98 F | WEIGHT: 293 LBS | HEIGHT: 65.75 IN | SYSTOLIC BLOOD PRESSURE: 138 MMHG | DIASTOLIC BLOOD PRESSURE: 86 MMHG | HEART RATE: 78 BPM

## 2019-01-22 DIAGNOSIS — I83.893 VARICOSE VEINS OF BOTH LOWER EXTREMITIES WITH COMPLICATIONS: ICD-10-CM

## 2019-01-22 DIAGNOSIS — E11.65 UNCONTROLLED TYPE 2 DIABETES MELLITUS WITH HYPERGLYCEMIA, WITHOUT LONG-TERM CURRENT USE OF INSULIN (HCC): ICD-10-CM

## 2019-01-22 DIAGNOSIS — G47.33 OSA (OBSTRUCTIVE SLEEP APNEA): ICD-10-CM

## 2019-01-22 DIAGNOSIS — E11.65 UNCONTROLLED TYPE 2 DIABETES MELLITUS WITH HYPERGLYCEMIA, WITHOUT LONG-TERM CURRENT USE OF INSULIN (HCC): Primary | ICD-10-CM

## 2019-01-22 DIAGNOSIS — E03.9 HYPOTHYROIDISM, UNSPECIFIED TYPE: ICD-10-CM

## 2019-01-22 DIAGNOSIS — E55.9 VITAMIN D INSUFFICIENCY: ICD-10-CM

## 2019-01-22 DIAGNOSIS — E78.00 HYPERCHOLESTEROLEMIA: ICD-10-CM

## 2019-01-22 DIAGNOSIS — E66.01 CLASS 3 SEVERE OBESITY DUE TO EXCESS CALORIES WITH SERIOUS COMORBIDITY AND BODY MASS INDEX (BMI) OF 50.0 TO 59.9 IN ADULT (HCC): ICD-10-CM

## 2019-01-22 DIAGNOSIS — I87.2 EDEMA OF BOTH LOWER EXTREMITIES DUE TO PERIPHERAL VENOUS INSUFFICIENCY: ICD-10-CM

## 2019-01-22 DIAGNOSIS — E55.9 VITAMIN D DEFICIENCY: ICD-10-CM

## 2019-01-22 LAB
ALBUMIN SERPL-MCNC: 3.2 G/DL (ref 3.1–4.5)
ALBUMIN/GLOB SERPL: 0.8 {RATIO} (ref 1–2)
ALP LIVER SERPL-CCNC: 125 U/L (ref 41–108)
ALT SERPL-CCNC: 20 U/L (ref 14–54)
ANION GAP SERPL CALC-SCNC: 6 MMOL/L (ref 0–18)
AST SERPL-CCNC: 17 U/L (ref 15–41)
BASOPHILS # BLD AUTO: 0.02 X10(3) UL (ref 0–0.1)
BASOPHILS NFR BLD AUTO: 0.2 %
BILIRUB SERPL-MCNC: 0.4 MG/DL (ref 0.1–2)
BUN BLD-MCNC: 11 MG/DL (ref 8–20)
BUN/CREAT SERPL: 16.2 (ref 10–20)
CALCIUM BLD-MCNC: 8.7 MG/DL (ref 8.3–10.3)
CHLORIDE SERPL-SCNC: 104 MMOL/L (ref 101–111)
CHOLEST SMN-MCNC: 154 MG/DL (ref ?–200)
CO2 SERPL-SCNC: 28 MMOL/L (ref 22–32)
CREAT BLD-MCNC: 0.68 MG/DL (ref 0.55–1.02)
CREAT UR-SCNC: 83.3 MG/DL
EOSINOPHIL # BLD AUTO: 0.21 X10(3) UL (ref 0–0.3)
EOSINOPHIL NFR BLD AUTO: 2.6 %
ERYTHROCYTE [DISTWIDTH] IN BLOOD BY AUTOMATED COUNT: 14.3 % (ref 11.5–16)
EST. AVERAGE GLUCOSE BLD GHB EST-MCNC: 212 MG/DL (ref 68–126)
GLOBULIN PLAS-MCNC: 4.1 G/DL (ref 2.8–4.4)
GLUCOSE BLD-MCNC: 176 MG/DL (ref 70–99)
HBA1C MFR BLD HPLC: 9 % (ref ?–5.7)
HCT VFR BLD AUTO: 39.7 % (ref 34–50)
HDLC SERPL-MCNC: 57 MG/DL (ref 40–59)
HGB BLD-MCNC: 12.5 G/DL (ref 12–16)
IMMATURE GRANULOCYTE COUNT: 0.02 X10(3) UL (ref 0–1)
IMMATURE GRANULOCYTE RATIO %: 0.2 %
LDLC SERPL CALC-MCNC: 81 MG/DL (ref ?–100)
LYMPHOCYTES # BLD AUTO: 2.17 X10(3) UL (ref 0.9–4)
LYMPHOCYTES NFR BLD AUTO: 26.9 %
M PROTEIN MFR SERPL ELPH: 7.3 G/DL (ref 6.4–8.2)
MCH RBC QN AUTO: 29.8 PG (ref 27–33.2)
MCHC RBC AUTO-ENTMCNC: 31.5 G/DL (ref 31–37)
MCV RBC AUTO: 94.7 FL (ref 81–100)
MICROALBUMIN UR-MCNC: <0.5 MG/DL
MONOCYTES # BLD AUTO: 0.61 X10(3) UL (ref 0.1–1)
MONOCYTES NFR BLD AUTO: 7.6 %
NEUTROPHIL ABS PRELIM: 5.04 X10 (3) UL (ref 1.3–6.7)
NEUTROPHILS # BLD AUTO: 5.04 X10(3) UL (ref 1.3–6.7)
NEUTROPHILS NFR BLD AUTO: 62.5 %
NONHDLC SERPL-MCNC: 97 MG/DL (ref ?–130)
OSMOLALITY SERPL CALC.SUM OF ELEC: 290 MOSM/KG (ref 275–295)
PLATELET # BLD AUTO: 310 10(3)UL (ref 150–450)
POTASSIUM SERPL-SCNC: 4.2 MMOL/L (ref 3.6–5.1)
RBC # BLD AUTO: 4.19 X10(6)UL (ref 3.8–5.1)
RED CELL DISTRIBUTION WIDTH-SD: 49.5 FL (ref 35.1–46.3)
SODIUM SERPL-SCNC: 138 MMOL/L (ref 136–144)
TRIGL SERPL-MCNC: 80 MG/DL (ref 30–149)
TSI SER-ACNC: 4.32 MIU/ML (ref 0.35–5.5)
VIT D+METAB SERPL-MCNC: 32.7 NG/ML (ref 30–100)
VLDLC SERPL CALC-MCNC: 16 MG/DL (ref 0–30)
WBC # BLD AUTO: 8.1 X10(3) UL (ref 4–13)

## 2019-01-22 PROCEDURE — 36415 COLL VENOUS BLD VENIPUNCTURE: CPT | Performed by: INTERNAL MEDICINE

## 2019-01-22 PROCEDURE — 82043 UR ALBUMIN QUANTITATIVE: CPT | Performed by: INTERNAL MEDICINE

## 2019-01-22 PROCEDURE — 80061 LIPID PANEL: CPT | Performed by: INTERNAL MEDICINE

## 2019-01-22 PROCEDURE — 83036 HEMOGLOBIN GLYCOSYLATED A1C: CPT | Performed by: INTERNAL MEDICINE

## 2019-01-22 PROCEDURE — 80050 GENERAL HEALTH PANEL: CPT | Performed by: INTERNAL MEDICINE

## 2019-01-22 PROCEDURE — 99214 OFFICE O/P EST MOD 30 MIN: CPT | Performed by: INTERNAL MEDICINE

## 2019-01-22 PROCEDURE — 82570 ASSAY OF URINE CREATININE: CPT | Performed by: INTERNAL MEDICINE

## 2019-01-22 PROCEDURE — 82306 VITAMIN D 25 HYDROXY: CPT | Performed by: INTERNAL MEDICINE

## 2019-01-22 RX ORDER — ATORVASTATIN CALCIUM 40 MG/1
40 TABLET, FILM COATED ORAL NIGHTLY
Qty: 90 TABLET | Refills: 1 | Status: SHIPPED | OUTPATIENT
Start: 2019-01-22 | End: 2020-02-14

## 2019-01-22 RX ORDER — ERGOCALCIFEROL 1.25 MG/1
CAPSULE ORAL
Refills: 0 | COMMUNITY
Start: 2018-11-30 | End: 2021-06-14 | Stop reason: CLARIF

## 2019-01-22 RX ORDER — FUROSEMIDE 20 MG/1
20 TABLET ORAL 2 TIMES DAILY PRN
Qty: 180 TABLET | Refills: 1 | Status: SHIPPED | OUTPATIENT
Start: 2019-01-22 | End: 2019-09-01

## 2019-01-22 RX ORDER — LEVOTHYROXINE SODIUM 0.05 MG/1
TABLET ORAL
Qty: 90 TABLET | Refills: 1 | Status: SHIPPED | OUTPATIENT
Start: 2019-01-22 | End: 2019-09-01

## 2019-01-22 NOTE — PROGRESS NOTES
Patient presents with: Follow - Up: 3 month follow up       HPI: Angel Alexandre presents today for 3-month f/u chronic medical conditions as follows:    1. Uncontrolled DM2 w/ hyperglycemia and w/o insulin - Still working on lifestyle measures.   She's had poor cov 1  •  ergocalciferol 58564 units Oral Cap, TAKE 1 CAPSULE BY MOUTH ONE TIME A WEEK, Disp: , Rfl: 0  •  MetFORMIN HCl 1000 MG Oral Tab, TAKE 1 TABLET BY MOUTH TWO TIMES A DAY WITH MEALS, Disp: , Rfl: 1    Social History    Tobacco Use      Smoking status: N Due for updated labs. Testing ordered. 2. Hypercholesterolemia - Stable on statin. No new issues. Due for updated labs. Testing ordered. 3. HypoTSH - Stable on prescription medication. No new issues. Testing ordered.   4. Varicose veins of the BLEs Consults:  VASCULAR SURGERY - INTERNAL  OP REFERRAL TO WEIGHT LOSS CLINIC

## 2019-09-01 DIAGNOSIS — I87.2 EDEMA OF BOTH LOWER EXTREMITIES DUE TO PERIPHERAL VENOUS INSUFFICIENCY: ICD-10-CM

## 2019-09-01 DIAGNOSIS — E03.9 HYPOTHYROIDISM, UNSPECIFIED TYPE: ICD-10-CM

## 2019-09-03 RX ORDER — LEVOTHYROXINE SODIUM 0.05 MG/1
TABLET ORAL
Qty: 30 TABLET | Refills: 0 | Status: SHIPPED | OUTPATIENT
Start: 2019-09-03 | End: 2019-11-30

## 2019-09-03 RX ORDER — FUROSEMIDE 20 MG/1
TABLET ORAL
Qty: 60 TABLET | Refills: 0 | Status: SHIPPED | OUTPATIENT
Start: 2019-09-03 | End: 2020-02-14

## 2019-09-03 NOTE — TELEPHONE ENCOUNTER
Furosemide 20 mg 1 tab bid prn filled 1-22-19 180 with 1 refill   Levothyroxine 50 mcg 1 tab daily filled 1-22-19 90 with 1 refill     LOV 1-22-19   . RTC 3 months.    No upcoming apt on file   Labs 1-22-19   Apt made for 9-30-19

## 2019-11-05 ENCOUNTER — DIAGNOSTIC TRANS (OUTPATIENT)
Dept: OTHER | Age: 54
End: 2019-11-05

## 2019-11-30 DIAGNOSIS — E03.9 HYPOTHYROIDISM, UNSPECIFIED TYPE: ICD-10-CM

## 2019-11-30 DIAGNOSIS — I87.2 EDEMA OF BOTH LOWER EXTREMITIES DUE TO PERIPHERAL VENOUS INSUFFICIENCY: ICD-10-CM

## 2019-12-02 DIAGNOSIS — I87.2 EDEMA OF BOTH LOWER EXTREMITIES DUE TO PERIPHERAL VENOUS INSUFFICIENCY: ICD-10-CM

## 2019-12-02 DIAGNOSIS — E03.9 HYPOTHYROIDISM, UNSPECIFIED TYPE: ICD-10-CM

## 2019-12-02 RX ORDER — LEVOTHYROXINE SODIUM 0.05 MG/1
TABLET ORAL
Qty: 30 TABLET | Refills: 2 | Status: SHIPPED | OUTPATIENT
Start: 2019-12-02 | End: 2020-02-14

## 2019-12-02 RX ORDER — FUROSEMIDE 20 MG/1
TABLET ORAL
Qty: 60 TABLET | Refills: 2 | Status: SHIPPED | OUTPATIENT
Start: 2019-12-02 | End: 2020-02-14

## 2019-12-02 NOTE — TELEPHONE ENCOUNTER
Levothyroxine 50 mcg 1 tab daily filled 9-3-19 30 with 0 refills   Furosemide 20 mg 1 tab bid prn filled 9-3-19 60 with 0 refills     LOV 1-22-19  RTC 3 months  Next apt 1-9-20   Labs 1-22-19

## 2019-12-04 RX ORDER — LEVOTHYROXINE SODIUM 0.05 MG/1
TABLET ORAL
Qty: 30 TABLET | Refills: 0 | OUTPATIENT
Start: 2019-12-04

## 2019-12-04 RX ORDER — FUROSEMIDE 20 MG/1
TABLET ORAL
Qty: 60 TABLET | Refills: 0 | OUTPATIENT
Start: 2019-12-04

## 2019-12-04 NOTE — TELEPHONE ENCOUNTER
Passed protocol     Last refill:  12/2/2019 Furosemide 20 mg #60 2R  12/2/2019 Levothyroxine 50 mcg #30 2R  - sent to [facility] in [place] on 12/2/2019

## 2020-02-14 ENCOUNTER — OFFICE VISIT (OUTPATIENT)
Dept: INTERNAL MEDICINE CLINIC | Facility: CLINIC | Age: 55
End: 2020-02-14

## 2020-02-14 VITALS
DIASTOLIC BLOOD PRESSURE: 82 MMHG | HEIGHT: 65 IN | BODY MASS INDEX: 46.48 KG/M2 | HEART RATE: 68 BPM | TEMPERATURE: 98 F | SYSTOLIC BLOOD PRESSURE: 138 MMHG | WEIGHT: 279 LBS

## 2020-02-14 DIAGNOSIS — E66.01 CLASS 3 SEVERE OBESITY DUE TO EXCESS CALORIES WITH SERIOUS COMORBIDITY AND BODY MASS INDEX (BMI) OF 45.0 TO 49.9 IN ADULT (HCC): ICD-10-CM

## 2020-02-14 DIAGNOSIS — Z00.00 ROUTINE GENERAL MEDICAL EXAMINATION AT A HEALTH CARE FACILITY: Primary | ICD-10-CM

## 2020-02-14 DIAGNOSIS — E11.65 UNCONTROLLED TYPE 2 DIABETES MELLITUS WITH HYPERGLYCEMIA, WITHOUT LONG-TERM CURRENT USE OF INSULIN (HCC): ICD-10-CM

## 2020-02-14 DIAGNOSIS — Z12.31 ENCOUNTER FOR SCREENING MAMMOGRAM FOR MALIGNANT NEOPLASM OF BREAST: ICD-10-CM

## 2020-02-14 DIAGNOSIS — E78.00 HYPERCHOLESTEROLEMIA: ICD-10-CM

## 2020-02-14 DIAGNOSIS — Z13.6 SCREENING FOR HEART DISEASE: ICD-10-CM

## 2020-02-14 DIAGNOSIS — G47.33 OSA (OBSTRUCTIVE SLEEP APNEA): ICD-10-CM

## 2020-02-14 DIAGNOSIS — E03.9 HYPOTHYROIDISM, UNSPECIFIED TYPE: ICD-10-CM

## 2020-02-14 DIAGNOSIS — I83.893 VARICOSE VEINS OF BOTH LOWER EXTREMITIES WITH COMPLICATIONS: ICD-10-CM

## 2020-02-14 PROBLEM — E55.9 VITAMIN D INSUFFICIENCY: Status: RESOLVED | Noted: 2018-02-14 | Resolved: 2020-02-14

## 2020-02-14 PROCEDURE — 99396 PREV VISIT EST AGE 40-64: CPT | Performed by: INTERNAL MEDICINE

## 2020-02-14 RX ORDER — LEVOTHYROXINE SODIUM 0.05 MG/1
TABLET ORAL
Qty: 90 TABLET | Refills: 3 | Status: SHIPPED | OUTPATIENT
Start: 2020-02-14 | End: 2021-04-30

## 2020-02-14 NOTE — PROGRESS NOTES
Patient presents with:  CPX      HPI: Tiffany Yanez presents today for female CPX. Stable health. Due for mammo and is interested in heart scan. Health goals still center around longevity, vitality, and QOL.     Review of Systems   All other systems reviewed and 138/82 (BP Location: Left arm, Patient Position: Sitting, Cuff Size: adult)   Pulse 68   Temp 97.9 °F (36.6 °C) (Oral)   Ht 65\"   Wt 279 lb (126.6 kg)   LMP 12/23/2019   BMI 46.43 kg/m²    Wt Readings from Last 6 Encounters:  02/14/20 : 279 lb (126.6 kg) - Push lifestyle measures. 7. PAT - Not on CPAP. 8. Varicose veins - Still present. Advised support measures. 9. RTC 3-6 months. Pamella Baumann verbally agrees to and understands the plan as outlined above.   She was also afforded the time and opportunity to as

## 2020-03-16 ENCOUNTER — TELEPHONE (OUTPATIENT)
Dept: INTERNAL MEDICINE CLINIC | Facility: CLINIC | Age: 55
End: 2020-03-16

## 2020-03-16 ENCOUNTER — HOSPITAL ENCOUNTER (OUTPATIENT)
Dept: ULTRASOUND IMAGING | Age: 55
Discharge: HOME | End: 2020-03-16
Attending: INTERNAL MEDICINE

## 2020-03-16 ENCOUNTER — OFFICE VISIT (OUTPATIENT)
Dept: INTERNAL MEDICINE CLINIC | Facility: CLINIC | Age: 55
End: 2020-03-16

## 2020-03-16 VITALS — DIASTOLIC BLOOD PRESSURE: 88 MMHG | SYSTOLIC BLOOD PRESSURE: 132 MMHG | HEART RATE: 78 BPM | TEMPERATURE: 98 F

## 2020-03-16 DIAGNOSIS — I83.893 VARICOSE VEINS OF BOTH LOWER EXTREMITIES WITH COMPLICATIONS: ICD-10-CM

## 2020-03-16 DIAGNOSIS — M79.89 PAIN AND SWELLING OF RIGHT LOWER LEG: ICD-10-CM

## 2020-03-16 DIAGNOSIS — M79.661 PAIN AND SWELLING OF RIGHT LOWER LEG: Primary | ICD-10-CM

## 2020-03-16 DIAGNOSIS — Z86.718 HISTORY OF DVT OF LOWER EXTREMITY: ICD-10-CM

## 2020-03-16 DIAGNOSIS — M79.89 PAIN AND SWELLING OF RIGHT LOWER LEG: Primary | ICD-10-CM

## 2020-03-16 DIAGNOSIS — M79.661 PAIN AND SWELLING OF RIGHT LOWER LEG: ICD-10-CM

## 2020-03-16 PROCEDURE — 93971 EXTREMITY STUDY: CPT | Performed by: INTERNAL MEDICINE

## 2020-03-16 PROCEDURE — 99214 OFFICE O/P EST MOD 30 MIN: CPT | Performed by: INTERNAL MEDICINE

## 2020-03-16 RX ORDER — OMEGA-3-ACID ETHYL ESTERS 1 G/1
1 CAPSULE, LIQUID FILLED ORAL DAILY
COMMUNITY
End: 2021-06-14 | Stop reason: CLARIF

## 2020-03-16 RX ORDER — MELATONIN
1000 DAILY
COMMUNITY
End: 2021-06-14 | Stop reason: CLARIF

## 2020-03-16 RX ORDER — FUROSEMIDE 20 MG/1
20 TABLET ORAL 2 TIMES DAILY
COMMUNITY
End: 2020-05-29

## 2020-03-16 RX ORDER — IBUPROFEN 200 MG
200 TABLET ORAL EVERY 6 HOURS PRN
Status: ON HOLD | COMMUNITY
End: 2021-06-08

## 2020-03-16 NOTE — TELEPHONE ENCOUNTER
[facility] radiology called with venous doppler results. Please review. Negative DVT, varicosities right proximal thigh, mildly prominent right groin lymph nodes.

## 2020-03-16 NOTE — PROGRESS NOTES
Patient presents with:  Leg Pain: upper and lower leg pain, swelling       HPI: Fatmata Cabrera presents today for 1 day of acute swelling of the R leg, above and below the knee. Some slight redness of the skin, mainly posteriorly. No trauma or trigger.   She does Smokeless tobacco: Never Used    Alcohol use: No    Drug use: No      PE:  /88 (BP Location: Right arm, Patient Position: Sitting, Cuff Size: adult)   Pulse 78   Temp 98.1 °F (36.7 °C) (Oral)   Wt Readings from Last 6 Encounters:  02/14/20 : 279 lb (

## 2020-03-18 ENCOUNTER — TELEPHONE (OUTPATIENT)
Dept: INTERNAL MEDICINE CLINIC | Facility: CLINIC | Age: 55
End: 2020-03-18

## 2020-03-18 NOTE — TELEPHONE ENCOUNTER
Pt called and stated was seen at [facility] on 3/16/20. Admitted due to 400+ blood sugar / tongue swelling / Mouth pain / Numbness all 4 extremities.     Insulin iv & blood thinner given at hospital.   MRI- complete and normal. Pt stated a few

## 2020-03-19 RX ORDER — GLIPIZIDE 10 MG/1
10 TABLET ORAL
Qty: 180 TABLET | Refills: 0 | Status: SHIPPED | OUTPATIENT
Start: 2020-03-19 | End: 2020-05-07

## 2020-03-19 NOTE — TELEPHONE ENCOUNTER
Pt agreeable to start medication. Also agreeable to have telephone visit tomorrow at 10am, pt aware may get charged for visit.     Confirmed ph w pt, please call [phone]

## 2020-03-19 NOTE — TELEPHONE ENCOUNTER
1. Metformin 1000 mg BID. Please send to pharmacy. #90 day supply  2. Glipizide 10 mg BID. Please send to pharmacy. #90 day supply. 3. Please set up telephone visit for tomorrow. I want to ask her more questions. 30 minute slot.   There is a charge f

## 2020-03-19 NOTE — TELEPHONE ENCOUNTER
What was she discharged home on? Jyl Aid. Eloina Brink MD  Diplomate, American Board of Internal Medicine  Member, [employer] of [address] [facility]  [address]  [address] [facility],[address] Floor, [address], [facility], [address] [place] [address]  T: [id number]; F: [phone]

## 2020-03-20 ENCOUNTER — TELEPHONE (OUTPATIENT)
Dept: INTERNAL MEDICINE CLINIC | Facility: CLINIC | Age: 55
End: 2020-03-20

## 2020-03-20 DIAGNOSIS — E11.65 UNCONTROLLED TYPE 2 DIABETES MELLITUS WITH HYPERGLYCEMIA, WITHOUT LONG-TERM CURRENT USE OF INSULIN (HCC): Primary | ICD-10-CM

## 2020-03-20 PROCEDURE — 99441 PHONE E/M BY PHYS 5-10 MIN: CPT | Performed by: INTERNAL MEDICINE

## 2020-03-20 RX ORDER — FLASH GLUCOSE SCANNING READER
EACH MISCELLANEOUS
Refills: 0 | Status: CANCELLED | OUTPATIENT
Start: 2020-03-20

## 2020-03-20 RX ORDER — FLASH GLUCOSE SENSOR
KIT MISCELLANEOUS
Refills: 0 | Status: CANCELLED | OUTPATIENT
Start: 2020-03-20

## 2020-03-20 NOTE — TELEPHONE ENCOUNTER
[address]  Pedro Suarez is a pt of Perez Grajeda does not insurance coverage at this time. Patient is hoping to get [employer] [address]. she is willing to pay cash. Please advise on process.     Thank you

## 2020-03-20 NOTE — TELEPHONE ENCOUNTER
Dr. Ronaldo Hardwick needs to send Rx to pharmacy and when she goes to pharmacy they will let her know how much it will cost.  It is about $40-50 a month.

## 2020-03-31 ENCOUNTER — TELEPHONE (OUTPATIENT)
Dept: INTERNAL MEDICINE CLINIC | Facility: CLINIC | Age: 55
End: 2020-03-31

## 2020-03-31 NOTE — TELEPHONE ENCOUNTER
Call patient. I received an e-mail from her daughter on 3/29/20. It sounds like mom has been having some intolerable SEs from the Metformin 1000 mg tabs including nausea and headaches.  Daughter states that she gave her one of her Metformin 850 mg tabs and

## 2020-05-07 RX ORDER — GLIPIZIDE 10 MG/1
TABLET ORAL
Qty: 180 TABLET | Refills: 0 | Status: SHIPPED | OUTPATIENT
Start: 2020-05-07 | End: 2021-07-06

## 2020-05-29 ENCOUNTER — OFFICE VISIT (OUTPATIENT)
Dept: INTERNAL MEDICINE CLINIC | Facility: CLINIC | Age: 55
End: 2020-05-29
Payer: COMMERCIAL

## 2020-05-29 ENCOUNTER — APPOINTMENT (OUTPATIENT)
Dept: LAB | Age: 55
End: 2020-05-29
Attending: INTERNAL MEDICINE
Payer: COMMERCIAL

## 2020-05-29 VITALS
TEMPERATURE: 98 F | DIASTOLIC BLOOD PRESSURE: 86 MMHG | WEIGHT: 293 LBS | SYSTOLIC BLOOD PRESSURE: 124 MMHG | HEART RATE: 68 BPM | BODY MASS INDEX: 48.82 KG/M2 | RESPIRATION RATE: 16 BRPM | HEIGHT: 65 IN

## 2020-05-29 DIAGNOSIS — E66.01 CLASS 3 SEVERE OBESITY DUE TO EXCESS CALORIES WITH SERIOUS COMORBIDITY AND BODY MASS INDEX (BMI) OF 50.0 TO 59.9 IN ADULT (HCC): ICD-10-CM

## 2020-05-29 DIAGNOSIS — I83.893 VARICOSE VEINS OF BOTH LOWER EXTREMITIES WITH COMPLICATIONS: ICD-10-CM

## 2020-05-29 DIAGNOSIS — M79.661 PAIN AND SWELLING OF RIGHT LOWER LEG: ICD-10-CM

## 2020-05-29 DIAGNOSIS — M79.89 PAIN AND SWELLING OF RIGHT LOWER LEG: ICD-10-CM

## 2020-05-29 DIAGNOSIS — E11.65 UNCONTROLLED TYPE 2 DIABETES MELLITUS WITH HYPERGLYCEMIA, WITHOUT LONG-TERM CURRENT USE OF INSULIN (HCC): Primary | ICD-10-CM

## 2020-05-29 DIAGNOSIS — Z86.718 HISTORY OF DVT OF LOWER EXTREMITY: ICD-10-CM

## 2020-05-29 DIAGNOSIS — E78.00 HYPERCHOLESTEROLEMIA: ICD-10-CM

## 2020-05-29 PROCEDURE — 80053 COMPREHEN METABOLIC PANEL: CPT | Performed by: INTERNAL MEDICINE

## 2020-05-29 PROCEDURE — 82043 UR ALBUMIN QUANTITATIVE: CPT | Performed by: INTERNAL MEDICINE

## 2020-05-29 PROCEDURE — 84439 ASSAY OF FREE THYROXINE: CPT | Performed by: INTERNAL MEDICINE

## 2020-05-29 PROCEDURE — 82570 ASSAY OF URINE CREATININE: CPT | Performed by: INTERNAL MEDICINE

## 2020-05-29 PROCEDURE — 99214 OFFICE O/P EST MOD 30 MIN: CPT | Performed by: INTERNAL MEDICINE

## 2020-05-29 PROCEDURE — 84443 ASSAY THYROID STIM HORMONE: CPT | Performed by: INTERNAL MEDICINE

## 2020-05-29 PROCEDURE — 83036 HEMOGLOBIN GLYCOSYLATED A1C: CPT | Performed by: INTERNAL MEDICINE

## 2020-05-29 PROCEDURE — 36415 COLL VENOUS BLD VENIPUNCTURE: CPT | Performed by: INTERNAL MEDICINE

## 2020-05-29 PROCEDURE — 80061 LIPID PANEL: CPT | Performed by: INTERNAL MEDICINE

## 2020-05-29 RX ORDER — FUROSEMIDE 20 MG/1
20 TABLET ORAL 2 TIMES DAILY
Qty: 180 TABLET | Refills: 0 | Status: ON HOLD | OUTPATIENT
Start: 2020-05-29 | End: 2021-06-08

## 2020-05-29 NOTE — PROGRESS NOTES
Patient presents with: Follow - Up: 2 month follow up       HPI: Michelle Ivy presents today for 2-month f/u select following chronic conditions as follows:    1. Uncontrolled DM2 w/ hyperglycemia - Due for updated labs. Compliant w/ prescription medication.  Hop Tobacco Use      Smoking status: Never Smoker      Smokeless tobacco: Never Used    Alcohol use: No    Drug use: No      PE:  /86 (BP Location: Left arm, Patient Position: Sitting, Cuff Size: adult)   Pulse 68   Temp 97.8 °F (36.6 °C) (Oral)   Resp [id number]  T: [phone]; F: [phone] for this Visit:  Requested Prescriptions     Signed Prescriptions Disp Refills   • metFORMIN HCl 850 MG Oral Tab 180 tablet 0     Sig: Take 1 tablet (850 mg total) by mouth 2 (two) times daily with

## 2020-06-01 RX ORDER — ATORVASTATIN CALCIUM 20 MG/1
20 TABLET, FILM COATED ORAL NIGHTLY
Qty: 90 TABLET | Refills: 3 | Status: SHIPPED | OUTPATIENT
Start: 2020-06-01 | End: 2021-06-14 | Stop reason: CLARIF

## 2021-04-30 ENCOUNTER — OFFICE VISIT (OUTPATIENT)
Dept: INTERNAL MEDICINE CLINIC | Facility: CLINIC | Age: 56
End: 2021-04-30
Payer: COMMERCIAL

## 2021-04-30 ENCOUNTER — LAB ENCOUNTER (OUTPATIENT)
Dept: LAB | Age: 56
End: 2021-04-30
Attending: INTERNAL MEDICINE
Payer: COMMERCIAL

## 2021-04-30 VITALS
BODY MASS INDEX: 48.82 KG/M2 | OXYGEN SATURATION: 99 % | DIASTOLIC BLOOD PRESSURE: 68 MMHG | SYSTOLIC BLOOD PRESSURE: 124 MMHG | WEIGHT: 293 LBS | TEMPERATURE: 99 F | RESPIRATION RATE: 12 BRPM | HEART RATE: 88 BPM | HEIGHT: 65 IN

## 2021-04-30 DIAGNOSIS — E78.00 HYPERCHOLESTEROLEMIA: ICD-10-CM

## 2021-04-30 DIAGNOSIS — E03.9 HYPOTHYROIDISM, UNSPECIFIED TYPE: ICD-10-CM

## 2021-04-30 DIAGNOSIS — Z00.00 ROUTINE PHYSICAL EXAMINATION: ICD-10-CM

## 2021-04-30 DIAGNOSIS — L03.115 CELLULITIS OF RIGHT LOWER EXTREMITY: ICD-10-CM

## 2021-04-30 DIAGNOSIS — N93.9 VAGINAL BLEEDING: ICD-10-CM

## 2021-04-30 DIAGNOSIS — E11.65 UNCONTROLLED TYPE 2 DIABETES MELLITUS WITH HYPERGLYCEMIA, WITHOUT LONG-TERM CURRENT USE OF INSULIN (HCC): ICD-10-CM

## 2021-04-30 DIAGNOSIS — Z00.00 ROUTINE PHYSICAL EXAMINATION: Primary | ICD-10-CM

## 2021-04-30 DIAGNOSIS — Z12.4 SCREENING FOR CERVICAL CANCER: ICD-10-CM

## 2021-04-30 DIAGNOSIS — Z12.31 ENCOUNTER FOR SCREENING MAMMOGRAM FOR MALIGNANT NEOPLASM OF BREAST: ICD-10-CM

## 2021-04-30 DIAGNOSIS — E66.01 CLASS 3 SEVERE OBESITY DUE TO EXCESS CALORIES WITH SERIOUS COMORBIDITY AND BODY MASS INDEX (BMI) OF 50.0 TO 59.9 IN ADULT (HCC): ICD-10-CM

## 2021-04-30 PROBLEM — Z87.2: Status: ACTIVE | Noted: 2021-04-30

## 2021-04-30 PROBLEM — Z86.718 HISTORY OF DVT (DEEP VEIN THROMBOSIS): Status: ACTIVE | Noted: 2021-04-30

## 2021-04-30 PROCEDURE — 85025 COMPLETE CBC W/AUTO DIFF WBC: CPT | Performed by: INTERNAL MEDICINE

## 2021-04-30 PROCEDURE — 80048 BASIC METABOLIC PNL TOTAL CA: CPT | Performed by: INTERNAL MEDICINE

## 2021-04-30 PROCEDURE — 80061 LIPID PANEL: CPT | Performed by: INTERNAL MEDICINE

## 2021-04-30 PROCEDURE — 84443 ASSAY THYROID STIM HORMONE: CPT | Performed by: INTERNAL MEDICINE

## 2021-04-30 PROCEDURE — 3078F DIAST BP <80 MM HG: CPT | Performed by: INTERNAL MEDICINE

## 2021-04-30 PROCEDURE — 3074F SYST BP LT 130 MM HG: CPT | Performed by: INTERNAL MEDICINE

## 2021-04-30 PROCEDURE — 82043 UR ALBUMIN QUANTITATIVE: CPT | Performed by: INTERNAL MEDICINE

## 2021-04-30 PROCEDURE — 84460 ALANINE AMINO (ALT) (SGPT): CPT | Performed by: INTERNAL MEDICINE

## 2021-04-30 PROCEDURE — 82570 ASSAY OF URINE CREATININE: CPT | Performed by: INTERNAL MEDICINE

## 2021-04-30 PROCEDURE — 3061F NEG MICROALBUMINURIA REV: CPT | Performed by: INTERNAL MEDICINE

## 2021-04-30 PROCEDURE — 3008F BODY MASS INDEX DOCD: CPT | Performed by: INTERNAL MEDICINE

## 2021-04-30 PROCEDURE — 84450 TRANSFERASE (AST) (SGOT): CPT | Performed by: INTERNAL MEDICINE

## 2021-04-30 PROCEDURE — 83036 HEMOGLOBIN GLYCOSYLATED A1C: CPT | Performed by: INTERNAL MEDICINE

## 2021-04-30 PROCEDURE — 87624 HPV HI-RISK TYP POOLED RSLT: CPT | Performed by: INTERNAL MEDICINE

## 2021-04-30 PROCEDURE — 99214 OFFICE O/P EST MOD 30 MIN: CPT | Performed by: INTERNAL MEDICINE

## 2021-04-30 PROCEDURE — 99396 PREV VISIT EST AGE 40-64: CPT | Performed by: INTERNAL MEDICINE

## 2021-04-30 RX ORDER — LEVOTHYROXINE SODIUM 0.05 MG/1
TABLET ORAL
Qty: 90 TABLET | Refills: 3 | Status: SHIPPED | OUTPATIENT
Start: 2021-04-30

## 2021-04-30 RX ORDER — SULFAMETHOXAZOLE AND TRIMETHOPRIM 800; 160 MG/1; MG/1
1 TABLET ORAL 2 TIMES DAILY
Status: ON HOLD | COMMUNITY
End: 2021-06-09

## 2021-04-30 RX ORDER — CEPHALEXIN 500 MG/1
500 CAPSULE ORAL 4 TIMES DAILY
Status: ON HOLD | COMMUNITY
End: 2021-06-09

## 2021-04-30 NOTE — PROGRESS NOTES
Patient Office Visit    ASSESSMENT AND PLAN:   (Z00.00) Routine physical examination  (primary encounter diagnosis)  Plan: ALT (SGPT), AST (SGOT), BASIC METABOLIC PANEL         (8), CBC WITH DIFFERENTIAL WITH PLATELET,         HEMOGLOBIN A1C, LIPID PANEL, sooner if symptoms get worse for the cellulitis otherwise 6 months      Patient/Caregiver Education: Patient/Caregiver Education: There are no barriers to learning. Medical education done. Outcome: Patient verbalizes understanding.  Patient is notified to c HCl 1000 MG Oral Tab 180 tablet 0     Sig: Take 1 tablet (1,000 mg total) by mouth 2 (two) times daily with meals.    • Levothyroxine Sodium 50 MCG Oral Tab 90 tablet 3     Si tablet by mouth one time daily         Marci Jennings DO  CC:  Patient Relation Age of Onset   • Other (parkinson's disease [Other]) Mother    • Other (cva [Other]) Father    • Other (htn [Other]) Father    • Other (healthy [Other]) Other      Allergies:    Penicillins             HIVES, RASH  Contrast Dye [Gadol*        Comm normal behavior     /68   Pulse 88   Temp 98.6 °F (37 °C) (Oral)   Resp 12   Ht 5' 5\" (1.651 m)   Wt 299 lb (135.6 kg)   LMP 03/27/2020   SpO2 99%   BMI 49.76 kg/m²      Omayra MCKEON present     PHYSICAL EXAM:   Constitutional: Vital signs reviewed as

## 2021-04-30 NOTE — PATIENT INSTRUCTIONS
Continue antibiotics and please have blood tests done  Continue to exercise at least 150 minutes a week and Eat a plant based diet    Please take 2000 IU of vitamin D daily and Please take 1200 mg of calcium daily (through diet or supplements)     Please h

## 2021-05-03 NOTE — PROGRESS NOTES
Dear RN, please let the patient know   1. Please schedule an office visit with me to discuss her results. Diabetes has worsened.   [facility] DO

## 2021-05-04 ENCOUNTER — TELEPHONE (OUTPATIENT)
Dept: INTERNAL MEDICINE CLINIC | Facility: CLINIC | Age: 56
End: 2021-05-04

## 2021-05-04 NOTE — TELEPHONE ENCOUNTER
Future Appointments   Date Time Provider Kingsley Thomas   5/5/2021 11:00 AM Yaw Osorio,  [facility] [place]

## 2021-05-04 NOTE — TELEPHONE ENCOUNTER
Please have patient schedule an in office visit with me to discuss test results.  Her diabetes has worsened     Marci Yañez DO

## 2021-05-05 ENCOUNTER — OFFICE VISIT (OUTPATIENT)
Dept: INTERNAL MEDICINE CLINIC | Facility: CLINIC | Age: 56
End: 2021-05-05
Payer: COMMERCIAL

## 2021-05-05 VITALS
OXYGEN SATURATION: 99 % | HEIGHT: 65 IN | WEIGHT: 293 LBS | TEMPERATURE: 98 F | RESPIRATION RATE: 16 BRPM | SYSTOLIC BLOOD PRESSURE: 120 MMHG | DIASTOLIC BLOOD PRESSURE: 80 MMHG | BODY MASS INDEX: 48.82 KG/M2 | HEART RATE: 91 BPM

## 2021-05-05 DIAGNOSIS — E03.9 HYPOTHYROIDISM, UNSPECIFIED TYPE: ICD-10-CM

## 2021-05-05 DIAGNOSIS — E11.65 UNCONTROLLED TYPE 2 DIABETES MELLITUS WITH HYPERGLYCEMIA, WITHOUT LONG-TERM CURRENT USE OF INSULIN (HCC): Primary | ICD-10-CM

## 2021-05-05 DIAGNOSIS — Z87.2 HISTORY OF CELLULITIS OF SKIN WITH LYMPHANGITIS: ICD-10-CM

## 2021-05-05 DIAGNOSIS — R60.9 SWELLING: ICD-10-CM

## 2021-05-05 DIAGNOSIS — E78.00 HYPERCHOLESTEROLEMIA: ICD-10-CM

## 2021-05-05 PROCEDURE — 99214 OFFICE O/P EST MOD 30 MIN: CPT | Performed by: INTERNAL MEDICINE

## 2021-05-05 PROCEDURE — 3074F SYST BP LT 130 MM HG: CPT | Performed by: INTERNAL MEDICINE

## 2021-05-05 PROCEDURE — 3008F BODY MASS INDEX DOCD: CPT | Performed by: INTERNAL MEDICINE

## 2021-05-05 PROCEDURE — 3079F DIAST BP 80-89 MM HG: CPT | Performed by: INTERNAL MEDICINE

## 2021-05-05 RX ORDER — PEN NEEDLE, DIABETIC 30 GX3/16"
1 NEEDLE, DISPOSABLE MISCELLANEOUS
Qty: 30 EACH | Refills: 0 | Status: SHIPPED | OUTPATIENT
Start: 2021-05-05 | End: 2021-12-06

## 2021-05-05 RX ORDER — SEMAGLUTIDE 1.34 MG/ML
0.5 INJECTION, SOLUTION SUBCUTANEOUS
Qty: 1 PEN | Refills: 1 | COMMUNITY
Start: 2021-05-05 | End: 2021-08-25

## 2021-05-05 RX ORDER — SEMAGLUTIDE 1.34 MG/ML
0.5 INJECTION, SOLUTION SUBCUTANEOUS
Qty: 1 PEN | Refills: 1 | Status: ON HOLD | OUTPATIENT
Start: 2021-05-05 | End: 2021-06-09

## 2021-05-05 NOTE — PROGRESS NOTES
Patient Office Visit    ASSESSMENT AND PLAN:   (E11.65) Uncontrolled type 2 diabetes mellitus with hyperglycemia, without long-term current use of insulin ([facility] [address]  (primary encounter diagnosis)  Plan: Semaglutide,0.25 or 0.5MG/DOS, (OZEMPIC, 0.25         OR serious comorbidity and body mass index (BMI) of 50.0 to 59.9 in adult [facility])     Uncontrolled type 2 diabetes mellitus with hyperglycemia, without long-term current use of insulin (HCC)     Hypercholesterolemia     Varicose veins of both lower extremities deficiency        Past Surgical History:   Procedure Laterality Date   •       x 5   • OTHER SURGICAL HISTORY  2011    Lap band placement   • OTHER SURGICAL HISTORY  2016    Lap band removal - Dr. Abigail Ware @ [facility]   • O by mouth daily. , Disp: , Rfl:   ergocalciferol 60508 units Oral Cap, TAKE 1 CAPSULE BY MOUTH ONE TIME A WEEK, Disp: , Rfl: 0    No current facility-administered medications on file prior to visit.         REVIEW OF SYSTEMS   Constitutional: no fatigue tremaine

## 2021-05-05 NOTE — PATIENT INSTRUCTIONS
Please start with 0.25 mg once a week for one month, then increase to 0.5 mg weekly for one month. If glucose levels still above 150, you can increase to 1 mg after 2 months.  Please send me a log of your blood sugar readings in 1 month and we will see each

## 2021-05-07 NOTE — PROGRESS NOTES
Dear RN, please let the patient know   1. Pap smear was negative for cervical cancer. We will repeat again in 3 years.  Robin Ortiz DO

## 2021-05-14 ENCOUNTER — HOSPITAL ENCOUNTER (OUTPATIENT)
Dept: ULTRASOUND IMAGING | Age: 56
Discharge: HOME | End: 2021-05-14
Attending: INTERNAL MEDICINE
Payer: COMMERCIAL

## 2021-05-14 DIAGNOSIS — R60.9 SWELLING: ICD-10-CM

## 2021-05-14 PROCEDURE — 93971 EXTREMITY STUDY: CPT | Performed by: INTERNAL MEDICINE

## 2021-05-17 ENCOUNTER — TELEPHONE (OUTPATIENT)
Dept: INTERNAL MEDICINE CLINIC | Facility: CLINIC | Age: 56
End: 2021-05-17

## 2021-05-17 NOTE — TELEPHONE ENCOUNTER
Please have patient schedule a video visit with me to discuss her ultrasound results    32 [facility] DO

## 2021-05-17 NOTE — TELEPHONE ENCOUNTER
Pt does not have [employer] access. Ok to schedule in-office visit if no access to camera phone?

## 2021-05-18 NOTE — TELEPHONE ENCOUNTER
Lvmtcb, seems that patient speaks Tunisian only according to voicemail setup. Montenegrin speaking provider please call to schedule.

## 2021-05-21 ENCOUNTER — TELEPHONE (OUTPATIENT)
Dept: INTERNAL MEDICINE CLINIC | Facility: CLINIC | Age: 56
End: 2021-05-21

## 2021-05-21 ENCOUNTER — OFFICE VISIT (OUTPATIENT)
Dept: INTERNAL MEDICINE CLINIC | Facility: CLINIC | Age: 56
End: 2021-05-21
Payer: COMMERCIAL

## 2021-05-21 VITALS
HEIGHT: 65 IN | BODY MASS INDEX: 48.82 KG/M2 | TEMPERATURE: 98 F | RESPIRATION RATE: 16 BRPM | DIASTOLIC BLOOD PRESSURE: 84 MMHG | WEIGHT: 293 LBS | SYSTOLIC BLOOD PRESSURE: 124 MMHG | OXYGEN SATURATION: 99 % | HEART RATE: 60 BPM

## 2021-05-21 DIAGNOSIS — I82.811 VENOUS EMBOLISM AND THROMBOSIS OF SUPERFICIAL VESSELS OF RIGHT LOWER EXTREMITY: Primary | ICD-10-CM

## 2021-05-21 DIAGNOSIS — E11.65 UNCONTROLLED TYPE 2 DIABETES MELLITUS WITH HYPERGLYCEMIA, WITHOUT LONG-TERM CURRENT USE OF INSULIN (HCC): ICD-10-CM

## 2021-05-21 PROCEDURE — 3079F DIAST BP 80-89 MM HG: CPT | Performed by: INTERNAL MEDICINE

## 2021-05-21 PROCEDURE — 3074F SYST BP LT 130 MM HG: CPT | Performed by: INTERNAL MEDICINE

## 2021-05-21 PROCEDURE — 3008F BODY MASS INDEX DOCD: CPT | Performed by: INTERNAL MEDICINE

## 2021-05-21 PROCEDURE — 99214 OFFICE O/P EST MOD 30 MIN: CPT | Performed by: INTERNAL MEDICINE

## 2021-05-21 RX ORDER — IBUPROFEN 600 MG/1
600 TABLET ORAL EVERY 6 HOURS PRN
Qty: 60 TABLET | Refills: 0 | Status: SHIPPED | OUTPATIENT
Start: 2021-05-21 | End: 2021-12-06

## 2021-05-21 NOTE — PATIENT INSTRUCTIONS
I have ordered ibuprofen to help with pain relief. Take it with food only. Compression stockings can help.  I will let you know if a repeat ultrasound is necessary    We will call your pharmacy to see if a different medication is preferred as the ozempic is

## 2021-05-21 NOTE — PROGRESS NOTES
Patient Office Visit    ASSESSMENT AND PLAN:   (I82.811) Venous embolism and thrombosis of superficial vessels of right lower extremity  (primary encounter diagnosis)  Plan: ibuprofen 600 MG Oral Tab  Note: reviewed ultrasound results with the patient.  No ibuprofen 600 MG Oral Tab 60 tablet 0     Sig: Take 1 tablet (600 mg total) by mouth every 6 (six) hours as needed for Pain.          Marci David Ban, DO  CC:  Patient presents with:  Test Results: US venous Dopp         HPI:   Ehsan Heck is a 64 [employer]  Influenza Virus Vac*        Comment:Influenza Virus Vaccine Whole  Current Meds:  Semaglutide,0.25 or 0.5MG/DOS, (OZEMPIC, 0.25 OR 0.5 MG/DOSE,) 2 MG/1.5ML Subcutaneous Solution Pen-injector, Inject 0.5 mg into the skin every 7 days. , Disp: normal respirations no cough   Cardiovascular: no CP, or palpitations   Gastrointestinal: normal bowels and no abd pains   Genitourinary:  normal urination no hematuria, no frequency   Musculoskeletal: no pains in arms/legs, normal range of motion   Skin:

## 2021-05-24 ENCOUNTER — TELEPHONE (OUTPATIENT)
Dept: INTERNAL MEDICINE CLINIC | Facility: CLINIC | Age: 56
End: 2021-05-24

## 2021-05-24 NOTE — TELEPHONE ENCOUNTER
----- Message from Alfonso Mcnally DO sent at 5/7/2021  9:43 AM CDT -----  Dear RN, please let the patient know   1. Pap smear was negative for cervical cancer. We will repeat again in 3 years.  Alfonso Mcnally DO

## 2021-06-04 ENCOUNTER — HOSPITAL ENCOUNTER (OUTPATIENT)
Dept: MAMMOGRAPHY | Age: 56
Discharge: HOME | End: 2021-06-04
Attending: INTERNAL MEDICINE
Payer: COMMERCIAL

## 2021-06-04 DIAGNOSIS — Z12.31 ENCOUNTER FOR SCREENING MAMMOGRAM FOR MALIGNANT NEOPLASM OF BREAST: ICD-10-CM

## 2021-06-04 PROCEDURE — 77063 BREAST TOMOSYNTHESIS BI: CPT | Performed by: INTERNAL MEDICINE

## 2021-06-04 PROCEDURE — 77067 SCR MAMMO BI INCL CAD: CPT | Performed by: INTERNAL MEDICINE

## 2021-06-06 ENCOUNTER — HOSPITAL ENCOUNTER (INPATIENT)
Facility: HOSPITAL | Age: 56
LOS: 2 days | Discharge: HOME | DRG: 372 | End: 2021-06-09
Attending: EMERGENCY MEDICINE | Admitting: INTERNAL MEDICINE
Payer: COMMERCIAL

## 2021-06-06 ENCOUNTER — APPOINTMENT (OUTPATIENT)
Dept: ULTRASOUND IMAGING | Facility: HOSPITAL | Age: 56
DRG: 372 | End: 2021-06-06
Attending: EMERGENCY MEDICINE
Payer: COMMERCIAL

## 2021-06-06 DIAGNOSIS — R50.9 ACUTE FEBRILE ILLNESS: Primary | ICD-10-CM

## 2021-06-06 DIAGNOSIS — I80.01 THROMBOPHLEBITIS OF SUPERFICIAL VEINS OF RIGHT LOWER EXTREMITY: ICD-10-CM

## 2021-06-06 PROCEDURE — 93971 EXTREMITY STUDY: CPT | Performed by: EMERGENCY MEDICINE

## 2021-06-06 RX ORDER — ACETAMINOPHEN 500 MG
1000 TABLET ORAL ONCE
Status: COMPLETED | OUTPATIENT
Start: 2021-06-06 | End: 2021-06-06

## 2021-06-07 ENCOUNTER — APPOINTMENT (OUTPATIENT)
Dept: GENERAL RADIOLOGY | Facility: HOSPITAL | Age: 56
DRG: 372 | End: 2021-06-07
Attending: EMERGENCY MEDICINE
Payer: COMMERCIAL

## 2021-06-07 ENCOUNTER — APPOINTMENT (OUTPATIENT)
Dept: CT IMAGING | Facility: HOSPITAL | Age: 56
DRG: 372 | End: 2021-06-07
Attending: EMERGENCY MEDICINE
Payer: COMMERCIAL

## 2021-06-07 PROBLEM — I80.01 THROMBOPHLEBITIS OF SUPERFICIAL VEINS OF RIGHT LOWER EXTREMITY: Status: ACTIVE | Noted: 2021-06-07

## 2021-06-07 PROBLEM — R50.9 ACUTE FEBRILE ILLNESS: Status: ACTIVE | Noted: 2021-06-07

## 2021-06-07 PROCEDURE — 74177 CT ABD & PELVIS W/CONTRAST: CPT | Performed by: EMERGENCY MEDICINE

## 2021-06-07 PROCEDURE — 71045 X-RAY EXAM CHEST 1 VIEW: CPT | Performed by: EMERGENCY MEDICINE

## 2021-06-07 PROCEDURE — 99223 1ST HOSP IP/OBS HIGH 75: CPT | Performed by: INTERNAL MEDICINE

## 2021-06-07 RX ORDER — MORPHINE SULFATE 2 MG/ML
1 INJECTION, SOLUTION INTRAMUSCULAR; INTRAVENOUS EVERY 2 HOUR PRN
Status: DISCONTINUED | OUTPATIENT
Start: 2021-06-07 | End: 2021-06-09

## 2021-06-07 RX ORDER — LEVOTHYROXINE SODIUM 0.05 MG/1
50 TABLET ORAL
Status: DISCONTINUED | OUTPATIENT
Start: 2021-06-07 | End: 2021-06-09

## 2021-06-07 RX ORDER — FUROSEMIDE 20 MG/1
20 TABLET ORAL 2 TIMES DAILY
Status: DISCONTINUED | OUTPATIENT
Start: 2021-06-07 | End: 2021-06-07

## 2021-06-07 RX ORDER — ENOXAPARIN SODIUM 100 MG/ML
0.5 INJECTION SUBCUTANEOUS DAILY
Status: DISCONTINUED | OUTPATIENT
Start: 2021-06-07 | End: 2021-06-09

## 2021-06-07 RX ORDER — ONDANSETRON 2 MG/ML
4 INJECTION INTRAMUSCULAR; INTRAVENOUS EVERY 4 HOURS PRN
Status: ACTIVE | OUTPATIENT
Start: 2021-06-07 | End: 2021-06-07

## 2021-06-07 RX ORDER — ACETAMINOPHEN 325 MG/1
650 TABLET ORAL EVERY 6 HOURS PRN
Status: DISCONTINUED | OUTPATIENT
Start: 2021-06-07 | End: 2021-06-07

## 2021-06-07 RX ORDER — ACETAMINOPHEN 500 MG
1000 TABLET ORAL EVERY 6 HOURS PRN
Status: DISCONTINUED | OUTPATIENT
Start: 2021-06-07 | End: 2021-06-09

## 2021-06-07 RX ORDER — IBUPROFEN 600 MG/1
600 TABLET ORAL EVERY 6 HOURS PRN
Status: DISCONTINUED | OUTPATIENT
Start: 2021-06-07 | End: 2021-06-09

## 2021-06-07 RX ORDER — MORPHINE SULFATE 4 MG/ML
4 INJECTION, SOLUTION INTRAMUSCULAR; INTRAVENOUS EVERY 2 HOUR PRN
Status: DISCONTINUED | OUTPATIENT
Start: 2021-06-07 | End: 2021-06-09

## 2021-06-07 RX ORDER — POTASSIUM CHLORIDE 20 MEQ/1
40 TABLET, EXTENDED RELEASE ORAL EVERY 4 HOURS
Status: COMPLETED | OUTPATIENT
Start: 2021-06-07 | End: 2021-06-07

## 2021-06-07 RX ORDER — ATORVASTATIN CALCIUM 20 MG/1
20 TABLET, FILM COATED ORAL NIGHTLY
Status: DISCONTINUED | OUTPATIENT
Start: 2021-06-07 | End: 2021-06-09

## 2021-06-07 RX ORDER — DEXTROSE MONOHYDRATE 25 G/50ML
50 INJECTION, SOLUTION INTRAVENOUS
Status: DISCONTINUED | OUTPATIENT
Start: 2021-06-07 | End: 2021-06-09

## 2021-06-07 RX ORDER — MORPHINE SULFATE 2 MG/ML
2 INJECTION, SOLUTION INTRAMUSCULAR; INTRAVENOUS EVERY 2 HOUR PRN
Status: DISCONTINUED | OUTPATIENT
Start: 2021-06-07 | End: 2021-06-09

## 2021-06-07 RX ORDER — ONDANSETRON 2 MG/ML
4 INJECTION INTRAMUSCULAR; INTRAVENOUS EVERY 6 HOURS PRN
Status: DISCONTINUED | OUTPATIENT
Start: 2021-06-07 | End: 2021-06-09

## 2021-06-07 NOTE — CONSULTS
INFECTIOUS DISEASE CONSULT NOTE    Christal Marrero Patient Status:  Inpatient    3/2/1965 MRN [id number]   [facility] 5NW-A Attending Trip Caballero DO   Hosp Day # 0 PCP Marci Barry [employer]  Influenza Virus Vac*        Comment:Influenza Virus Vaccine Whole    Medications:    Current Facility-Administered Medications:   •  glucose (DEX4) oral liquid 15 g, 15 g, Oral, Q15 Min PRN **OR** Glucose-Vitamin C (DEX-4) chewable tab 4 ta cough, wheezing  Cardiovascular: Negative for chest pain, dyspnea  Gastrointestinal: as above  : Negative for hematuria or flank pain  Integument: Negative for rash, pruritus.   Hematologic/lymphatic: Negative for easy bruising, bleeding  Musculoskeletal: BLOODURINE Negative   PHURINE 8.0   PROUR Negative   UROBILINOGEN <2.0   NITRITE Negative   LEUUR Negative        Microbiology    Reviewed in EMR,    Radiology: XR CHEST AP/PA (1 VIEW) (CPT=71045)    Result Date: 6/7/2021  PROCEDURE:  XR CHEST AP/PA (1 V THROMBI:  None visible. COMPRESSION:  Normal compressibility, phasicity, and augmentation. OTHER:  Distal thigh thrombosed superficial varicosity noted corresponds to region of interest.            CONCLUSION:  1.  No sonographic evidence for deep venous th free fluid. ABDOMINAL WALL:  No mass or hernia. URINARY BLADDER:  Not well distended. No visible focal wall thickening, lesion, or calculus. PELVIC NODES:  No adenopathy. PELVIC ORGANS:  Pelvic organs appropriate for patient age.   BONES:  No bony lesi

## 2021-06-07 NOTE — PROGRESS NOTES
Count includes the [facility] Pharmacy Note: Antimicrobial Weight Based Dose Adjustment for: cefepime (MAXIPIME)    Parmjit Vega is a 64year old patient who has been prescribed cefepime (MAXIPIME) 1 gm every 8 hours.     Estimated Creatinine Clearance: 95.8 mL/min (based on SC

## 2021-06-07 NOTE — PROGRESS NOTES
[facility] Pharmacy Note: Antimicrobial Weight Based Dose Adjustment for: cefepime (MAXIPIME)    Cal Alvares is a 64year old patient who has been prescribed cefepime (MAXIPIME) 1 gm x 1 dose.     Estimated Creatinine Clearance: 95.8 mL/min (based on SCr of

## 2021-06-07 NOTE — ED PROVIDER NOTES
Patient Seen in: [facility] Emergency Department      History   Patient presents with:  Fever    Stated Complaint: fever started today, elevated blood glucose    HPI/Subjective:   HPI    27-year-old female with past medical history of diabetes, DVT Temp src Oral   SpO2 93 %   O2 Device None (Room air)       Current:/69   Pulse 108   Temp (!) 102.8 °F (39.3 °C) (Oral)   Resp 21   Wt 135.6 kg   LMP 03/27/2020   SpO2 93%   BMI 49.76 kg/m²         Physical Exam  Vitals and nursing note reviewed. All other components within normal limits   CBC W/ DIFFERENTIAL - Abnormal; Notable for the following components:    WBC 11.3 (*)     Neutrophil Absolute Prelim 9.67 (*)     Neutrophil Absolute 9.67 (*)     Lymphocyte Absolute 0.92 (*)     All other com rigidity. She does have some right upper quadrant tenderness. Plan for CT of the abdomen to assess for acute inflammatory or infectious cause of symptoms such as pyelonephritis, cholecystitis, intraabdominal abscess or other acute cause of symptoms.   Sagar Reviewed: 5/21/2021        ICD-10-CM Noted POA    * (Principal) Acute febrile illness R50.9 6/7/2021 Unknown

## 2021-06-07 NOTE — ED INITIAL ASSESSMENT (HPI)
Patient arrives from home with c/o fever, elevated blood sugar, also c/o pain and tenderness to right lower leg.  Just finished oral antibiotic for cellulitis

## 2021-06-07 NOTE — PROGRESS NOTES
[facility] Pharmacy Note:  Anticoagulation Weight Dose Adjustment for enoxaparin    Percilla Fill is a 64year old patient who has been prescribed enoxaparin 40 mg every 24 hours. Estimated Creatinine Clearance: 95.8 mL/min (based on SCr of 0.59 mg/dL).

## 2021-06-08 ENCOUNTER — APPOINTMENT (OUTPATIENT)
Dept: ULTRASOUND IMAGING | Facility: HOSPITAL | Age: 56
DRG: 372 | End: 2021-06-08
Attending: INTERNAL MEDICINE
Payer: COMMERCIAL

## 2021-06-08 PROCEDURE — 76705 ECHO EXAM OF ABDOMEN: CPT | Performed by: INTERNAL MEDICINE

## 2021-06-08 PROCEDURE — 99232 SBSQ HOSP IP/OBS MODERATE 35: CPT | Performed by: INTERNAL MEDICINE

## 2021-06-08 RX ORDER — CIPROFLOXACIN 2 MG/ML
400 INJECTION, SOLUTION INTRAVENOUS EVERY 12 HOURS
Status: DISCONTINUED | OUTPATIENT
Start: 2021-06-08 | End: 2021-06-09

## 2021-06-08 RX ORDER — FUROSEMIDE 20 MG/1
20 TABLET ORAL 2 TIMES DAILY
Qty: 180 TABLET | Refills: 0 | Status: SHIPPED | OUTPATIENT
Start: 2021-06-11

## 2021-06-08 RX ORDER — SODIUM CHLORIDE 9 MG/ML
INJECTION, SOLUTION INTRAVENOUS CONTINUOUS
Status: DISCONTINUED | OUTPATIENT
Start: 2021-06-08 | End: 2021-06-09

## 2021-06-08 NOTE — PLAN OF CARE
Pt is A&Ox4, Singaporean speaking- able to communicate in [address] VSS, febrile- TMAX 100.6- tylenol given per MAR. Maintaining O2 sats >95% on RA. Denies SOB. Tele, ST. Lovenox. EP. Last BM 6/7. Pt having diarrhea, c-diff neg. Denies N/V. NPO at MN, accu QID.

## 2021-06-08 NOTE — PROGRESS NOTES
Patient alert and oriented x4. Indonesian speaking but does not require interp. Tele NSR. : RA. Afebrile on shift. Last fever was early .8. Started on fluids 83ml 0.9% NS. Continues IV ABx changed to cipro. Cont x2. Diarrhea persists x3 on shift.

## 2021-06-08 NOTE — PROGRESS NOTES
INFECTIOUS DISEASE PROGRESS NOTE    Oswald Elliott Patient Status:  Inpatient    3/2/1965 MRN [id number]   [facility] 5NW-A Attending Nat Jennings, DO   Hosp Day # 1 [facility] Intravenous, Q8H  •  acetaminophen (TYLENOL EXTRA STRENGTH) tab 1,000 mg, 1,000 mg, Oral, Q6H PRN  •  ibuprofen (MOTRIN) tab 600 mg, 600 mg, Oral, Q6H PRN    Review of Systems:    Completed.  See pertinent positives and negatives as above    Physical Exam: PUNEET  Recent Labs   Lab 06/06/21  2336   COLORUR Yellow   CLARITY Clear   SPECGRAVITY 1.008   GLUUR Negative   BILUR Negative   KETUR Negative   BLOODURINE Negative   PHURINE 8.0   PROUR Negative   UROBILINOGEN <2.0   NITRITE Negative   LEUUR Negative STATED HISTORY: (As transcribed by Technologist)     FINDINGS:  EXTREMITY EXAMINED:  Right lower extremity SAPHENOFEMORAL JUNCTION:  No reflux. THROMBI:  None visible. COMPRESSION:  Normal compressibility, phasicity, and augmentation.  OTHER:  Distal thigh aneurysm or dissection. RETROPERITONEUM:  No mass or adenopathy. BOWEL/MESENTERY:  Normal caliber small and large bowel including the appendix. No free fluid. ABDOMINAL WALL:  No mass or hernia. URINARY BLADDER:  Not well distended.   No visible focal

## 2021-06-08 NOTE — PLAN OF CARE
Assumed care of pt @ 0730. Pt is A/Ox 4. On RA, VSS, SR on tele. Pt with TMax 100.8,tylenol given. IVF s/l  Tolerating diabetic diet. Pt states pain is tolerable but states 3-7/10 abdominal prior to having a BM. Denies need for pain medicine.    Up as

## 2021-06-08 NOTE — PROGRESS NOTES
[facility] HOSPITALIST  Progress Note     Karla Johanas Patient Status:  Inpatient    3/2/1965 MRN [id number]   [facility] 5NW-A Attending Roma Alcaraz DO   Hosp Day # 1 PCP Marci Yañez DO     Chief Complaint: Fever, diarrhea --  17  --  20   BILT 0.5  --  0.6  --  0.4   TP 7.1  --  6.7  --  6.9    < > = values in this interval not displayed. Estimated Creatinine Clearance: 94.2 mL/min (based on SCr of 0.6 mg/dL).     No results for input(s): PTP, INR in the last 168 hours discontinue isolation: yes     Will the patient be referred to TCC on discharge?: no  Estimated date of discharge: 0-1 days  Discharge is dependent on: progress  At this point Ms. Tae Holloway is expected to be discharge to: home    Plan of care discussed wit

## 2021-06-09 VITALS
WEIGHT: 293 LBS | SYSTOLIC BLOOD PRESSURE: 134 MMHG | HEART RATE: 84 BPM | DIASTOLIC BLOOD PRESSURE: 74 MMHG | TEMPERATURE: 98 F | RESPIRATION RATE: 18 BRPM | OXYGEN SATURATION: 97 % | BODY MASS INDEX: 50 KG/M2

## 2021-06-09 PROCEDURE — 99239 HOSP IP/OBS DSCHRG MGMT >30: CPT | Performed by: HOSPITALIST

## 2021-06-09 RX ORDER — CIPROFLOXACIN 500 MG/1
500 TABLET, FILM COATED ORAL 2 TIMES DAILY
Qty: 4 TABLET | Refills: 0 | Status: SHIPPED | OUTPATIENT
Start: 2021-06-09 | End: 2021-06-11

## 2021-06-09 NOTE — DIETARY NOTE
[facility]     Admitting diagnosis:  Acute febrile illness [R50.9]  Thrombophlebitis of superficial veins of right lower extremity [I80.01]    Ht:  5'5''  Wt: (!) 137 kg (302 lb).    Body mass index is 50.26 kg

## 2021-06-09 NOTE — PROGRESS NOTES
INFECTIOUS DISEASE PROGRESS NOTE    Gerald Renee Patient Status:  Inpatient    3/2/1965 MRN [id number]   [facility] 5NW-A Attending Marti Lambert, [facility] Day # 2 [facility] mg, Oral, Nightly  •  acetaminophen (TYLENOL EXTRA STRENGTH) tab 1,000 mg, 1,000 mg, Oral, Q6H PRN  •  ibuprofen (MOTRIN) tab 600 mg, 600 mg, Oral, Q6H PRN    Review of Systems:    Completed.  See pertinent positives and negatives as above    Physical Exam: [id number]  Recent Labs   Lab 06/06/21  9716   COLORUR Yellow   CLARITY Clear   SPECGRAVITY 1.008   GLUUR Negative   BILUR Negative   KETUR Negative   BLOODURINE Negative   PHURINE 8.0   PROUR Negative   UROBILINOGEN <2.0   NITRITE Negative femoral vein. PATIENT STATED HISTORY: (As transcribed by Technologist)     FINDINGS:  EXTREMITY EXAMINED:  Right lower extremity SAPHENOFEMORAL JUNCTION:  No reflux. THROMBI:  None visible.  COMPRESSION:  Normal compressibility, phasicity, and augmentation AORTA/VASCULAR:  No aneurysm or dissection. RETROPERITONEUM:  No mass or adenopathy. BOWEL/MESENTERY:  Normal caliber small and large bowel including the appendix. No free fluid. ABDOMINAL WALL:  No mass or hernia.   URINARY BLADDER:  Not well distended

## 2021-06-09 NOTE — PROGRESS NOTES
NURSING DISCHARGE NOTE    Discharged Home via Wheelchair. Accompanied by Support staff  Belongings Taken by patient/family. All discharge education provided and all questions answered.   Patient transported to [facility] lobby to be picked up

## 2021-06-09 NOTE — PLAN OF CARE
Pt is A&Ox4, Surinamese speaking- able to communicate in [address] VSS, afebrile. Maintaining O2 sats >96% on RA. Denies SOB. Tele, NSR-ST. Lovenox. EP. Last BM 6/8. Diarrhea is improving. Denies N/V. Tolerating carb controlled, accu QID. Voids. Up at [facility].  Emil Childress

## 2021-06-09 NOTE — PROGRESS NOTES
[facility] HOSPITALIST  Progress Note     Austin Began Patient Status:  Inpatient    3/2/1965 MRN [id number]   [facility] 5NW-A Attending Milka Hernandes DO   Hosp Day # 2 PCP Marci Jennings DO     Chief Complaint: Fever, diarrhea values in this interval not displayed. Estimated Creatinine Clearance: 94.2 mL/min (based on SCr of 0.6 mg/dL). No results for input(s): PTP, INR in the last 168 hours.          COVID-19 Lab Results    COVID-19  Lab Results   Component Value Date discharge?: no  Estimated date of discharge: Today  Discharge is dependent on: progress  At this point Ms. Aldo Thomas is expected to be discharge to: home    Plan of care discussed with patient/RN    Cruz Noguera MD  [facility]

## 2021-06-10 ENCOUNTER — PATIENT OUTREACH (OUTPATIENT)
Dept: CASE MANAGEMENT | Age: 56
End: 2021-06-10

## 2021-06-10 DIAGNOSIS — Z02.9 ENCOUNTERS FOR UNSPECIFIED ADMINISTRATIVE PURPOSE: ICD-10-CM

## 2021-06-10 NOTE — PAYOR COMM NOTE
--------------  ADMISSION REVIEW     Payor: Emma VYAS/JOSIE  Subscriber #:  [id number]  Authorization Number: [id number]    Admit date: 6/7/21  Admit time:  4:13 AM       Admitting Physician: Rhonda Santana MD  Attending Physician:  Kristen att. providers fo systems are as noted in HPI. Constitutional and vital signs reviewed. All other systems reviewed and negative except as noted above.     Physical Exam     ED Triage Vitals [06/06/21 2250]   /50   Pulse (!) 121   Resp 24   Temp (!) 102.8 °F (39.3 Venous pCO2 37 (*)     Venous pO2 57 (*)     Venous O2 Saturation 89 (*)     Venous O2 Sat.  Calc. 89 (*)     Venous Bicarbonate 22.7 (*)     All other components within normal limits   LIPASE - Abnormal; Notable for the following components:    Lipase 48 ( findings. MDM      19-year-old female presents today with fevers. Patient's daughter assisted with interpretation of patient's request.  Patient does report an upper abdominal pain along with vomiting.   Her abdomen is soft without any guarding Disposition:  Admit  6/7/2021  2:15 am      Hospital Problems     Present on Admission  Date Reviewed: 5/21/2021        ICD-10-CM Noted POA    * (Principal) Acute febrile illness R50.9 6/7/2021 Unknown                 Signed by JENNA Burgos any headache. Denies any focal weakness or numbness. Denies any dysuria.       History:  Past Medical History:   Diagnosis Date   • Cellulitis and abscess of unspecified digit 6/19/2012   • DVT (deep venous thrombosis) (HCC)    • Dyslipidemia    • Gastric 06/06/2021    .0 06/06/2021    CREATSERUM 0.59 06/06/2021    BUN 13 06/06/2021     06/06/2021    K 3.9 06/06/2021     06/06/2021    CO2 25.0 06/06/2021     06/06/2021    CA 8.1 06/06/2021    ALB 3.0 06/06/2021    ALKPHO 124 06/06/ Hypothyroidism  1. Continue levothyroxine  7. Hyperlipidemia  1. Continue statin  8. Diabetes mellitus type 2, uncontrolled with hyperglycemia  1. Hyperglycemia protocol  2. Hold home Metformin and glipizide and ozempic  3.  Will give Levemir with NovoLog c [facility] 5NW-A Attending Natan Tello DO   Hosp Day # 1 PCP Marci Huertas DO      Chief Complaint: Fever, diarrhea     S: Patient seen, still having diarrhea but feels less, no abdominal pain, has appetite, legs not bothering her      Review of Sy not displayed.         Estimated Creatinine Clearance: 94.2 mL/min (based on SCr of 0.6 mg/dL).     No results for input(s): PTP, INR in the last 168 hours.        COVID-19 Lab Results     COVID-19        Lab Results   Component Value Date     COVID19 Not discharge?: no  Discharge is dependent on: progress  At this point Ms. Carlson Fears is expected to be discharge to: home     Plan of care discussed with patient/RN     Guy Stokes DO                                   6/9     Laura Zamora MD   Physician --  0.60   GFRAA 118  --  130  --  118   GFRNAA 103  --  112  --  102   CA 8.1*  --  7.9*  --  8.5   ALB 3.0*  --  2.8*  --  2.6*   *  --  134*  --  136   K 3.9   < > 3.6 4.0 3.9     --  104  --  104   CO2 25.0  --  24.0  --  25.0   ALKPHO 124* mellitus type 2, uncontrolled with hyperglycemia  1. Hyperglycemia protocol  2. Will give Levemir with NovoLog carb counting and correction factor  3. Follow Accu-Cheks  1. Morbid Obesity  1.  BMI 50     Plan of care:  PA home     Quality:  · DVT Prophylaxi intensity.  Nonradiating.  Associated with 3 episodes of nonbilious nonbloody emesis.  Denies any diarrhea.  Denies any chest pain or shortness of breath.  Denies any cold or cough symptoms.  Denies any dysuria frequency.  Denies any hematuria.  Patient  Refills: 0      Ozempic (0.25 or 0.5 MG/DOSE) 2 MG/1.5ML Sopn  Generic drug: Semaglutide(0.25 or 0.5MG/DOS)  What changed: Another medication with the same name was removed.  Continue taking this medication, and follow the directions you see here.       Inj Medications             These medications were sent to [id number] - [place], [address] [phone], [address] [place] [address], [place] [phone]     Phone: [phone] ·   furosemide 20 MG Tabs      Please  your IN LAST 1 DAY:  Insulin Aspart Pen (NOVOLOG) 100 UNIT/ML flexpen 1-10 Units     Date Action Dose Route User    Discharged on 6/9/2021 6/9/2021 1327 Given 3 Units Subcutaneous (Left Upper Arm) Jossue Caicedo, RN          REVIEWER COMMENTS:     OTHER:

## 2021-06-10 NOTE — PROGRESS NOTES
[facility] contact information provided as well as [facility] office number, [phone].

## 2021-06-10 NOTE — DISCHARGE SUMMARY
[facility] HOSPITALIST  DISCHARGE SUMMARY     Allyson Hernandez Patient Status:  Inpatient    3/2/1965 MRN [id number]   [facility] 5NW-A Attending No att. providers found   Hosp Day # 2 PCP Marci Phillips DO     Date of Admission:  after discharge from the hospital.    Procedures during hospitalization:   • None    Incidental or significant findings and recommendations (brief descriptions):  • None    Lab/Test results pending at Discharge:   · None    Consultants:  • ID    Discharge tablet  Refills: 0     Levothyroxine Sodium 50 MCG Tabs  Commonly known as: SYNTHROID      1 tablet by mouth one time daily   Quantity: 90 tablet  Refills: 3     metFORMIN HCl 1000 MG Tabs  Commonly known as: GLUCOPHAGE      Take 1 tablet (1,000 mg total) Vital signs:  Temp:  [98.1 °F (36.7 °C)] 98.1 °F (36.7 °C)  BP: (134)/(74) 134/74    Physical Exam:    General: No acute distress. Respiratory: Clear to auscultation bilaterally. No wheezes. No rhonchi.   Cardiovascular: S1, S2. Regular

## 2021-06-11 NOTE — PAYOR COMM NOTE
--------------  DISCHARGE REVIEW----ALL CLINICALS FAXED---REQUESTING APPROVAL UP TO BUT NOT INCL 6/9 DISCHARGE DATE.       Payor: Jenny VYAS/JOSIE  Subscriber #:  [id number]  Authorization Number: [id number]    Admit date: 6/7/21  Admit time:   4:13 AM  D dysuria frequency. Denies any hematuria. Patient states she did not take Covid vaccine because of history of allergy to flu shots. Patient's daughter also notes states that patient's blood sugar was elevated near 300 today at home.   Patient denies any h you see here. Inject 0.5 mg into the skin every 7 days.    Quantity: 1 pen  Refills: 1        CONTINUE taking these medications      Instructions Prescription details   atorvastatin 20 MG Tabs  Commonly known as: LIPITOR      Take 1 tablet (20 mg total Bring a paper prescription for each of these medications  · Ciprofloxacin HCl 500 MG Tabs         [place]    Follow-up appointment:   Lul Kellogg DO  [facility]  [phone]    In 1 week      Appoint

## 2021-06-14 ENCOUNTER — OFFICE VISIT (OUTPATIENT)
Dept: INTERNAL MEDICINE CLINIC | Facility: CLINIC | Age: 56
End: 2021-06-14
Payer: COMMERCIAL

## 2021-06-14 VITALS
DIASTOLIC BLOOD PRESSURE: 90 MMHG | OXYGEN SATURATION: 98 % | SYSTOLIC BLOOD PRESSURE: 134 MMHG | RESPIRATION RATE: 18 BRPM | HEART RATE: 86 BPM | HEIGHT: 62 IN | TEMPERATURE: 98 F | BODY MASS INDEX: 53.92 KG/M2 | WEIGHT: 293 LBS

## 2021-06-14 DIAGNOSIS — E11.65 UNCONTROLLED TYPE 2 DIABETES MELLITUS WITH HYPERGLYCEMIA, WITHOUT LONG-TERM CURRENT USE OF INSULIN (HCC): ICD-10-CM

## 2021-06-14 DIAGNOSIS — A04.4 E. COLI GASTROENTERITIS: Primary | ICD-10-CM

## 2021-06-14 PROCEDURE — 3075F SYST BP GE 130 - 139MM HG: CPT | Performed by: NURSE PRACTITIONER

## 2021-06-14 PROCEDURE — 3080F DIAST BP >= 90 MM HG: CPT | Performed by: NURSE PRACTITIONER

## 2021-06-14 PROCEDURE — 3008F BODY MASS INDEX DOCD: CPT | Performed by: NURSE PRACTITIONER

## 2021-06-14 PROCEDURE — 99203 OFFICE O/P NEW LOW 30 MIN: CPT | Performed by: NURSE PRACTITIONER

## 2021-06-14 NOTE — PROGRESS NOTES
Multiple attempts to reach pt and messages left with no return call. Pt went in for TCC appt today. Encounter closing.

## 2021-06-14 NOTE — PROGRESS NOTES
[address]      HISTORY   CHIEF COMPLAINT: E. Coli gastroenteritis and uncontrolled DMII.     HPI: Renan Amador is a 64year old female here today for hospital follow up for E.  Coli gastroenteritis and uncont (OZEMPIC, 0.25 OR 0.5 MG/DOSE,) 2 MG/1.5ML Subcutaneous Solution Pen-injector, Inject 0.5 mg into the skin every 7 days. , Disp: 1 pen, Rfl: 1  metFORMIN HCl 1000 MG Oral Tab, Take 1 tablet (1,000 mg total) by mouth 2 (two) times daily with meals. , Disp: 18 Dictated by (CST): Christiano Hebert MD on 6/07/2021 at 8:10 AM     Finalized by (CST): Christiano Hebert MD on 6/07/2021 at 8:11 AM       Leilani Padron (SCM=[id number])    Result Date: 6/8/2021  CONCLUSION:  There is biliary sludge. No gallstones are seen.   No g (CST): Mandy Lincoln MD on 6/07/2021 at 6:37 AM     Finalized by (CST): Mandy Lincoln MD on 6/07/2021 at 6:40 AM         Lab Results   Component Value Date/Time    WBC 8.8 06/07/2021 06:21 AM    HGB 13.2 06/07/2021 06:21 AM    HCT 39.8 06/07/2021 06:21 AM    PLT audible murmur  GI: + BS to all quadrants, no tenderness  MUSCULOSKELETAL: + ROM in all joints, no evidence of swelling, pain   EXTREMITIES: no cyanosis, or edema  NEURO: Oriented x3  PSYCHIATRIC: appropriate affect    ASSESSMENT/ PLAN:   1. E. coli gastro Maintenance:  Diabetes Care Dilated Eye Exam Never done  Pneumococcal Vaccine: Birth to 64yrs(1 of 1 - PPSV23) Never done  COVID-19 Vaccine(1) Never done  Zoster Vaccines(1 of 2) Never done  Diabetes Care A1C due on 07/30/2021  Influenza Vaccine(Season End within 7 days from date of discharge. Discharge Disposition/Plan:     Future Appointments   Date Time Provider Kingsley Thomas   7/6/2021  2:30 PM Yaw Osorio DO [facility] [place]        1. Transitional Care Clinic Visit: Discharged  2.   P

## 2021-06-14 NOTE — PROGRESS NOTES
TRANSITIONAL CARE CLINIC PHARMACIST MEDICATION RECONCILIATION        Nasrin Lovett MRN [id number]    3/2/1965 PCP Marci Harrison DO       Comments: Medication history completed by the [address] [place] [address] Pharmacist with the patient in the Glipizide and Metformin before breakfast and dinner. Also discussed that the patient should still be taking the Atorvastatin to protect her heart with her diabetes. Patient states she will discuss with her PCP. Answered all questions.     Reviewed all me

## 2021-06-25 NOTE — H&P
[facility] HOSPITALIST                                                               History & Physical         Delford Perfect Patient Status:  Emergency    3/2/1965 MRN [id number]   Location [address] [facility] Attending [facility]   • OTHER SURGICAL HISTORY     • STAB PHLEBECTOMY, VARICOSE VEINS, 1 EXTREMITY; <20 INCISIONS         Family history:  Family History   Problem Relation Age of Onset   • Other (parkinson's disease) Mother    • Stroke Father    • Heart Disord  06/06/2021    K 3.9 06/06/2021     06/06/2021    CO2 25.0 06/06/2021     06/06/2021    CA 8.1 06/06/2021    ALB 3.0 06/06/2021    ALKPHO 124 06/06/2021    BILT 0.5 06/06/2021    TP 7.1 06/06/2021    AST 14 06/06/2021    ALT 21 06/06/ 30 mellitus type 2, uncontrolled with hyperglycemia  1. Hyperglycemia protocol  2. Hold home Metformin and glipizide and ozempic  3. Will give Levemir with NovoLog carb counting and correction factor  4. Follow Accu-Cheks  9.  Thrombosed varicose veins of the

## 2021-07-06 ENCOUNTER — OFFICE VISIT (OUTPATIENT)
Dept: INTERNAL MEDICINE CLINIC | Facility: CLINIC | Age: 56
End: 2021-07-06
Payer: COMMERCIAL

## 2021-07-06 VITALS
WEIGHT: 293 LBS | HEIGHT: 62 IN | BODY MASS INDEX: 53.92 KG/M2 | SYSTOLIC BLOOD PRESSURE: 138 MMHG | OXYGEN SATURATION: 96 % | RESPIRATION RATE: 16 BRPM | DIASTOLIC BLOOD PRESSURE: 86 MMHG | TEMPERATURE: 98 F | HEART RATE: 85 BPM

## 2021-07-06 DIAGNOSIS — A04.4 E. COLI GASTROENTERITIS: ICD-10-CM

## 2021-07-06 DIAGNOSIS — I82.811 VENOUS EMBOLISM AND THROMBOSIS OF SUPERFICIAL VESSELS OF RIGHT LOWER EXTREMITY: ICD-10-CM

## 2021-07-06 DIAGNOSIS — E03.9 HYPOTHYROIDISM, UNSPECIFIED TYPE: ICD-10-CM

## 2021-07-06 DIAGNOSIS — E11.65 UNCONTROLLED TYPE 2 DIABETES MELLITUS WITH HYPERGLYCEMIA, WITHOUT LONG-TERM CURRENT USE OF INSULIN (HCC): Primary | ICD-10-CM

## 2021-07-06 PROCEDURE — 3075F SYST BP GE 130 - 139MM HG: CPT | Performed by: INTERNAL MEDICINE

## 2021-07-06 PROCEDURE — 3008F BODY MASS INDEX DOCD: CPT | Performed by: INTERNAL MEDICINE

## 2021-07-06 PROCEDURE — 3079F DIAST BP 80-89 MM HG: CPT | Performed by: INTERNAL MEDICINE

## 2021-07-06 PROCEDURE — 99214 OFFICE O/P EST MOD 30 MIN: CPT | Performed by: INTERNAL MEDICINE

## 2021-07-06 RX ORDER — GLIPIZIDE 10 MG/1
10 TABLET ORAL
Qty: 60 TABLET | Refills: 3 | Status: SHIPPED | OUTPATIENT
Start: 2021-07-06

## 2021-07-06 NOTE — PROGRESS NOTES
Patient Office Visit    ASSESSMENT AND PLAN:   (E11.65) Uncontrolled type 2 diabetes mellitus with hyperglycemia, without long-term current use of insulin ([facility]  (primary encounter diagnosis)  Plan: glipiZIDE 10 MG Oral Tab, HEMOGLOBIN A1C  Note: refill pr Standing Expiration Date: 7/6/2022          Order Specific Question: Release to patient          Answer: Immediate    Requested Prescriptions     Signed Prescriptions Disp Refills   • glipiZIDE 10 MG Oral Tab 60 tablet 3     Sig: Take 1 tablet (10 mg total No    Family History:  Family History   Problem Relation Age of Onset   • Other (parkinson's disease) Mother    • Stroke Father    • Heart Disorder Father    • Other (cva) Father    • Other (htn) Father    • Other (healthy) Other      Allergies:    Contras Cuff Size: adult)   Pulse 85   Temp 97.6 °F (36.4 °C)   Resp 16   Ht 5' 2\" (1.575 m)   Wt (!) 304 lb (137.9 kg)   LMP 05/17/2021   SpO2 96%   BMI 55.60 kg/m²     PHYSICAL EXAM:   Constitutional: Vital signs reviewed as noted, well developed, in no acute d

## 2021-07-06 NOTE — PATIENT INSTRUCTIONS
Please have your blood test end of July  Increase ozempic to 1 mg after completing 0.5 mg of ozempic for one month  I have refilled your glipizide

## 2021-07-29 NOTE — PAYOR COMM NOTE
--------------  DISCHARGE REVIEW    Payor: [address] [place] [address] LILLIAM/JOSIE  Subscriber #:  [id number]  Authorization Number: [id number]    PEUM AUDIT CLINICAL    Admit date: 6/7/21  Admit time:   4:13 AM  Discharge Date: 6/9/2021  5:31 PM     Admitting Physician: Thony Regalado not take Covid vaccine because of history of allergy to flu shots. Patient's daughter also notes states that patient's blood sugar was elevated near 300 today at home. Patient denies any headache. Denies any focal weakness or numbness.   Denies any dysur Quantity: 1 pen  Refills: 1        CONTINUE taking these medications      Instructions Prescription details   atorvastatin 20 MG Tabs  Commonly known as: LIPITOR      Take 1 tablet (20 mg total) by mouth nightly.    Quantity: 90 tablet  Refills: 3     ergoc medications  · Ciprofloxacin HCl 500 MG Tabs         [place]    Follow-up appointment:   Lul Kellogg DO  [address] [place] [address] [address]  [place] [id number]    In 1 week      Appointments for Next 30 Days 6/10/2021 - 7/10/2021 Q15 Min PRN **OR** Glucose-Vitamin C (DEX-4) chewable tab 8 tablet, 8 tablet, Oral, Q15 Min PRN  •  Enoxaparin Sodium (LOVENOX) 80 MG/0.8ML injection 70 mg, 0.5 mg/kg, Subcutaneous, Daily  •  morphINE sulfate (PF) 2 MG/ML injection 1 mg, 1 mg, Intravenous, bowel sounds. Musculoskeletal: Full range of motion of all extremities. No arthritis or deformity  Integument: small erythematous area RLE, + palpable cord.  No open wounds.     Laboratory Data:          Recent Labs   Lab 06/06/21  3556 06/07/21  7510   R CHF. The lungs are clear. The costophrenic angles are sharp. There is no active disease seen. Preliminary reading provided by radiologist from [address] [place] [address] Radiology.              CONCLUSION:  The cardiac shadow is enlarged.  However, there is no sign of CHF, p CONTRAST, NO ORAL (ER)  COMPARISON:  [facility] , CT, CT ABD(C/S)/PELVIS(C) (SET), 5/29/2007, 11:00 AM.  INDICATIONS:  fever started today, elevated blood glucose  TECHNIQUE:  CT scanning was performed from the dome of the diaphragm to the pubic symphysis with 6/07/2021 at 6:37 AM     Finalized by (CST): Genet Mendoza MD on 6/07/2021 at 6:40 AM        ASSESSMENT:  1. Acute diarrheal illness- due to E coli (both EAEC and EPEC in GI panel)- illness seems more c/w diarrhea due to E coli than norovirus  a.  Better today

## 2021-07-30 NOTE — TELEPHONE ENCOUNTER
Name from pharmacy: metFORMIN HCl Oral Tablet 1000 MG         Will file in chart as: METFORMIN HCL 1000 MG Oral Tab    Sig: TAKE 1 TABLET BY MOUTH TWO TIMES A DAY WITH MEALS    Disp:  180 tablet    Refills:  0    Start: 7/30/2021    Class: Normal    Non-f

## 2021-08-20 ENCOUNTER — LAB ENCOUNTER (OUTPATIENT)
Dept: LAB | Age: 56
End: 2021-08-20
Attending: INTERNAL MEDICINE
Payer: COMMERCIAL

## 2021-08-20 DIAGNOSIS — E11.65 UNCONTROLLED TYPE 2 DIABETES MELLITUS WITH HYPERGLYCEMIA, WITHOUT LONG-TERM CURRENT USE OF INSULIN (HCC): ICD-10-CM

## 2021-08-20 LAB
EST. AVERAGE GLUCOSE BLD GHB EST-MCNC: 283 MG/DL (ref 68–126)
HBA1C MFR BLD HPLC: 11.5 % (ref ?–5.7)

## 2021-08-20 PROCEDURE — 83036 HEMOGLOBIN GLYCOSYLATED A1C: CPT | Performed by: INTERNAL MEDICINE

## 2021-08-20 PROCEDURE — 3046F HEMOGLOBIN A1C LEVEL >9.0%: CPT | Performed by: INTERNAL MEDICINE

## 2021-08-23 NOTE — PROGRESS NOTES
Dear RN, please let the patient know   1. Diabetes has improved, but still uncontrolled. Has she been taking 1 mg of ozempic? If so can you update that in the chart?  As she has improved I am okay with continuing current medications with exercising and mikey

## 2021-08-24 ENCOUNTER — TELEPHONE (OUTPATIENT)
Dept: INTERNAL MEDICINE CLINIC | Facility: CLINIC | Age: 56
End: 2021-08-24

## 2021-08-24 DIAGNOSIS — E11.65 UNCONTROLLED TYPE 2 DIABETES MELLITUS WITH HYPERGLYCEMIA, WITHOUT LONG-TERM CURRENT USE OF INSULIN (HCC): Primary | ICD-10-CM

## 2021-08-24 NOTE — TELEPHONE ENCOUNTER
Can we call her pharmacy and see if Piyush Bbo is covered. Patient states ozempic was too expensive.      [facility] DO

## 2021-08-25 ENCOUNTER — TELEPHONE (OUTPATIENT)
Dept: INTERNAL MEDICINE CLINIC | Facility: CLINIC | Age: 56
End: 2021-08-25

## 2021-08-25 RX ORDER — PEN NEEDLE, DIABETIC 30 GX3/16"
1 NEEDLE, DISPOSABLE MISCELLANEOUS DAILY
Qty: 90 EACH | Refills: 0 | Status: SHIPPED | OUTPATIENT
Start: 2021-08-25 | End: 2021-12-06

## 2021-08-25 RX ORDER — LIRAGLUTIDE 6 MG/ML
INJECTION SUBCUTANEOUS
Qty: 3 ML | Refills: 0 | Status: SHIPPED | OUTPATIENT
Start: 2021-08-25 | End: 2021-09-02

## 2021-08-25 NOTE — TELEPHONE ENCOUNTER
Spoke with pharmacist, victoza $753 after insurance. Stated patient has high deductible of $2200 so brand names will be expensive for her. Notified to keep rx until discussed with Dr. Maricarmen Noguera and spoken to patient.

## 2021-08-25 NOTE — TELEPHONE ENCOUNTER
LVM w/patient stating ozempic $653 after insurance (which she was already aware of) and Lovetta Pilling would cost about $224 after insurance as well. Dr. Camacho Dickerson other option would be insulin.  Patient to return call to verify which option would be best for her

## 2021-08-25 NOTE — TELEPHONE ENCOUNTER
[address] and spoke with Lazarus Flower would cost $867 after insurance. To find out about Victoza, rx would have be sent through.

## 2021-08-25 NOTE — TELEPHONE ENCOUNTER
I have sent victoza to see if that is covered.  If not may need to consider farxiga or naldo Huertas DO

## 2021-08-31 NOTE — TELEPHONE ENCOUNTER
Per Dr. Linda Berry will do PA for Jardiance 10 mg.    Key: [id number]    Your information has been submitted to [employer]. [employer] is reviewing the PA request and you will receive an electronic response.  You may check for the updated outcome later by reope

## 2021-09-02 NOTE — TELEPHONE ENCOUNTER
Please let patient know that I have ordered jardiance. This may increase urinary tract infections so if she has any burning sensation or increase in frequency, she should let me know.  She should follow up with me in 3 months    Allan Gómez DO

## 2021-09-09 RX ORDER — ATORVASTATIN CALCIUM 20 MG/1
TABLET, FILM COATED ORAL
Qty: 90 TABLET | Refills: 0 | OUTPATIENT
Start: 2021-09-09

## 2021-09-17 ENCOUNTER — LAB ENCOUNTER (OUTPATIENT)
Dept: LAB | Age: 56
End: 2021-09-17
Attending: INTERNAL MEDICINE
Payer: COMMERCIAL

## 2021-09-17 DIAGNOSIS — E11.65 UNCONTROLLED TYPE 2 DIABETES MELLITUS WITH HYPERGLYCEMIA, WITHOUT LONG-TERM CURRENT USE OF INSULIN (HCC): ICD-10-CM

## 2021-09-17 LAB
BILIRUB UR QL STRIP.AUTO: NEGATIVE
COLOR UR AUTO: YELLOW
GLUCOSE UR STRIP.AUTO-MCNC: >=500 MG/DL
LEUKOCYTE ESTERASE UR QL STRIP.AUTO: NEGATIVE
NITRITE UR QL STRIP.AUTO: NEGATIVE
PH UR STRIP.AUTO: 6 [PH] (ref 5–8)
PROT UR STRIP.AUTO-MCNC: NEGATIVE MG/DL
RBC UR QL AUTO: NEGATIVE
SP GR UR STRIP.AUTO: >1.03 (ref 1–1.03)
UROBILINOGEN UR STRIP.AUTO-MCNC: <2 MG/DL

## 2021-09-17 PROCEDURE — 81003 URINALYSIS AUTO W/O SCOPE: CPT | Performed by: INTERNAL MEDICINE

## 2021-09-17 NOTE — TELEPHONE ENCOUNTER
Spoke with patient stating does have itching, and dysuria, along with urinary frequency since starting Jardiance. Patient denies any other symptoms. Please advise.

## 2021-09-20 NOTE — PROGRESS NOTES
Dear RN, please let the patient know   1. Urine test is not consistent with an infection, but a lot of glucose seen due to diabetes. She will benefit from insulin. Please let me know if she is interested as Lanny Aragon is causing her to have symptoms.  Marci

## 2021-09-22 ENCOUNTER — TELEPHONE (OUTPATIENT)
Dept: INTERNAL MEDICINE CLINIC | Facility: CLINIC | Age: 56
End: 2021-09-22

## 2021-10-04 ENCOUNTER — TELEPHONE (OUTPATIENT)
Dept: INTERNAL MEDICINE CLINIC | Facility: CLINIC | Age: 56
End: 2021-10-04

## 2021-10-04 DIAGNOSIS — E11.65 UNCONTROLLED TYPE 2 DIABETES MELLITUS WITH HYPERGLYCEMIA, WITHOUT LONG-TERM CURRENT USE OF INSULIN (HCC): Primary | ICD-10-CM

## 2021-10-04 RX ORDER — LIRAGLUTIDE 6 MG/ML
INJECTION SUBCUTANEOUS
Qty: 3 ML | Refills: 0 | Status: SHIPPED | OUTPATIENT
Start: 2021-10-04 | End: 2021-11-17

## 2021-10-04 RX ORDER — PEN NEEDLE, DIABETIC 30 GX3/16"
1 NEEDLE, DISPOSABLE MISCELLANEOUS DAILY
Qty: 90 EACH | Refills: 0 | Status: SHIPPED | OUTPATIENT
Start: 2021-10-04 | End: 2021-12-06

## 2021-10-04 NOTE — TELEPHONE ENCOUNTER
I have ordered victoza, it is a daily injection, but this is not insulin. Ozempic was not covered, we can see if this will be covered.     32 [facility] DO

## 2021-10-06 NOTE — TELEPHONE ENCOUNTER
Left voicemail with patient. When patient calls back please inform of message below:    Dr. Tay Downs states she has ordered victoza, it is a daily injection, but this is not insulin. Ozempic was not covered, we can see if this will be covered.

## 2021-10-19 ENCOUNTER — TELEPHONE (OUTPATIENT)
Dept: INTERNAL MEDICINE CLINIC | Facility: CLINIC | Age: 56
End: 2021-10-19

## 2021-10-19 NOTE — TELEPHONE ENCOUNTER
Pt walked in and dropped off form from dentist that needs to be filled out before her appointment on 10/21. Placed form in Dr Alexus Jiang fax folder.

## 2021-11-10 ENCOUNTER — TELEPHONE (OUTPATIENT)
Dept: INTERNAL MEDICINE CLINIC | Facility: CLINIC | Age: 56
End: 2021-11-10

## 2021-11-10 NOTE — TELEPHONE ENCOUNTER
Patients daughter, Dalton Christopher, called office and stated that her mom has been feeling sick since Friday. Patient took a swab test for covid antigen and came back positive.  For the past 3 days patient has been feeling worse, with symptoms such as diarrhea, chill

## 2021-11-10 NOTE — TELEPHONE ENCOUNTER
Spoke with patient and patient's daughter/Loretta (verbal consent from patient received)    Patient's daughter states pt tested positive for covid on Monday with at home test     Symptoms ongoing since Friday    Fever -- 102 on Monday, has been taking tyleno

## 2021-12-06 ENCOUNTER — OFFICE VISIT (OUTPATIENT)
Dept: INTERNAL MEDICINE CLINIC | Facility: CLINIC | Age: 56
End: 2021-12-06
Payer: COMMERCIAL

## 2021-12-06 ENCOUNTER — LAB ENCOUNTER (OUTPATIENT)
Dept: LAB | Age: 56
End: 2021-12-06
Attending: INTERNAL MEDICINE
Payer: COMMERCIAL

## 2021-12-06 VITALS
OXYGEN SATURATION: 96 % | TEMPERATURE: 98 F | BODY MASS INDEX: 53 KG/M2 | HEART RATE: 76 BPM | SYSTOLIC BLOOD PRESSURE: 110 MMHG | HEIGHT: 62 IN | RESPIRATION RATE: 18 BRPM | DIASTOLIC BLOOD PRESSURE: 80 MMHG | WEIGHT: 288 LBS

## 2021-12-06 DIAGNOSIS — Z09 HOSPITAL DISCHARGE FOLLOW-UP: Primary | ICD-10-CM

## 2021-12-06 DIAGNOSIS — M79.89 PAIN AND SWELLING OF RIGHT LOWER EXTREMITY: ICD-10-CM

## 2021-12-06 DIAGNOSIS — E11.65 UNCONTROLLED TYPE 2 DIABETES MELLITUS WITH HYPERGLYCEMIA, WITHOUT LONG-TERM CURRENT USE OF INSULIN (HCC): ICD-10-CM

## 2021-12-06 DIAGNOSIS — M79.604 PAIN AND SWELLING OF RIGHT LOWER EXTREMITY: ICD-10-CM

## 2021-12-06 DIAGNOSIS — I26.99 PE (PULMONARY THROMBOEMBOLISM) (HCC): ICD-10-CM

## 2021-12-06 PROCEDURE — 3008F BODY MASS INDEX DOCD: CPT | Performed by: INTERNAL MEDICINE

## 2021-12-06 PROCEDURE — 3046F HEMOGLOBIN A1C LEVEL >9.0%: CPT | Performed by: INTERNAL MEDICINE

## 2021-12-06 PROCEDURE — 3074F SYST BP LT 130 MM HG: CPT | Performed by: INTERNAL MEDICINE

## 2021-12-06 PROCEDURE — 80048 BASIC METABOLIC PNL TOTAL CA: CPT | Performed by: INTERNAL MEDICINE

## 2021-12-06 PROCEDURE — 99214 OFFICE O/P EST MOD 30 MIN: CPT | Performed by: INTERNAL MEDICINE

## 2021-12-06 PROCEDURE — 81001 URINALYSIS AUTO W/SCOPE: CPT | Performed by: INTERNAL MEDICINE

## 2021-12-06 PROCEDURE — 83036 HEMOGLOBIN GLYCOSYLATED A1C: CPT | Performed by: INTERNAL MEDICINE

## 2021-12-06 PROCEDURE — 3079F DIAST BP 80-89 MM HG: CPT | Performed by: INTERNAL MEDICINE

## 2021-12-06 RX ORDER — DULAGLUTIDE 1.5 MG/.5ML
1.5 INJECTION, SOLUTION SUBCUTANEOUS WEEKLY
Qty: 2 ML | Refills: 0 | Status: SHIPPED | OUTPATIENT
Start: 2021-12-06 | End: 2022-01-31

## 2021-12-06 RX ORDER — AMOXICILLIN 500 MG/1
CAPSULE ORAL
COMMUNITY
Start: 2021-10-15 | End: 2021-12-06 | Stop reason: ALTCHOICE

## 2021-12-06 RX ORDER — SULFAMETHOXAZOLE AND TRIMETHOPRIM 800; 160 MG/1; MG/1
1 TABLET ORAL 2 TIMES DAILY
COMMUNITY
Start: 2021-11-07 | End: 2021-12-06 | Stop reason: ALTCHOICE

## 2021-12-06 RX ORDER — ACETAMINOPHEN AND CODEINE PHOSPHATE 300; 30 MG/1; MG/1
1 TABLET ORAL AS NEEDED
COMMUNITY
Start: 2021-11-04 | End: 2021-12-06

## 2021-12-06 RX ORDER — DEXAMETHASONE 6 MG/1
TABLET ORAL
COMMUNITY
Start: 2021-11-22 | End: 2021-12-06 | Stop reason: ALTCHOICE

## 2021-12-06 RX ORDER — AZITHROMYCIN 500 MG/1
TABLET, FILM COATED ORAL
COMMUNITY
Start: 2021-11-04 | End: 2021-12-06 | Stop reason: ALTCHOICE

## 2021-12-06 RX ORDER — CEFDINIR 300 MG/1
CAPSULE ORAL
COMMUNITY
Start: 2021-11-22 | End: 2021-12-06 | Stop reason: ALTCHOICE

## 2021-12-06 RX ORDER — DULAGLUTIDE 0.75 MG/.5ML
0.5 INJECTION, SOLUTION SUBCUTANEOUS WEEKLY
Qty: 1 EACH | Refills: 0 | COMMUNITY
Start: 2021-12-06 | End: 2022-01-04

## 2021-12-06 NOTE — PATIENT INSTRUCTIONS
We will obtain records from the hospital  Continue the eliquis for at least 6 months  Start trulicity 1.34 mg once a week for one month then you can go to [employer] and  the higher dose (1.5 mg once a week)    Call me back with your glucose readings (c

## 2021-12-06 NOTE — PROGRESS NOTES
Patient Office Visit    ASSESSMENT AND PLAN:   (U06) Hospital discharge follow-up  (primary encounter diagnosis)  Plan: will obtain records, but was hospitalized at [place] [address] from 11-18 to 11-22 for covid pneumonia and pulmonary embolism.  Recomm insulin ([address]     Hypercholesterolemia     Varicose veins of both lower extremities with complications     History of DVT (deep vein thrombosis)     History of cellulitis of skin with lymphangitis     Venous embolism and thrombosis of superficial vessels o Embolism   - since then strength is coming back   - has not received the covid vaccine     Yet, but interested    2.  Diabetes:   - her glucose levels were going in    the 400s-500s   - previously no medications were     Covered through insurance   - they a TWO TIMES A DAY WITH MEALS, Disp: 180 tablet, Rfl: 0  glipiZIDE 10 MG Oral Tab, Take 1 tablet (10 mg total) by mouth 2 (two) times daily before meals. , Disp: 60 tablet, Rfl: 3  furosemide 20 MG Oral Tab, Take 1 tablet (20 mg total) by mouth 2 (two) times d

## 2021-12-06 NOTE — PROGRESS NOTES
Completed Trulicity injection training with patient and family member present. Patient successfully demonstrated proper use of medication.

## 2021-12-07 NOTE — PROGRESS NOTES
Dear RN, please let the patient know   1. Diabetes is uncontrolled. She should continue all of her previous medications and start the trulicity. She should let me know how her morning glucose is in the next 2 weeks.  I would like to repeat an A1c in 2 month

## 2021-12-08 ENCOUNTER — TELEPHONE (OUTPATIENT)
Dept: INTERNAL MEDICINE CLINIC | Facility: CLINIC | Age: 56
End: 2021-12-08

## 2021-12-09 NOTE — TELEPHONE ENCOUNTER
Pts daughter, Chino Banegas (no on verbal) called for test results. Pt was with her during this time but requests that we call her and she will do a 3 way call for consent.     Ph: [phone]

## 2021-12-09 NOTE — TELEPHONE ENCOUNTER
Spoke with patient discussed test results,   1. Diabetes is uncontrolled. She should continue all of her previous medications and start the trulicity. She should let me know how her morning glucose is in the next 2 weeks.  I would like to repeat an A1c in 2

## 2021-12-10 NOTE — TELEPHONE ENCOUNTER
Patient notified of samples available for pick-up, transferred to front staff to assist with scheduling NV.

## 2021-12-23 ENCOUNTER — OFFICE VISIT (OUTPATIENT)
Dept: INTERNAL MEDICINE CLINIC | Facility: CLINIC | Age: 56
End: 2021-12-23
Payer: COMMERCIAL

## 2021-12-23 VITALS
HEIGHT: 62 IN | RESPIRATION RATE: 16 BRPM | OXYGEN SATURATION: 97 % | WEIGHT: 291.19 LBS | HEART RATE: 97 BPM | TEMPERATURE: 99 F | SYSTOLIC BLOOD PRESSURE: 120 MMHG | BODY MASS INDEX: 53.58 KG/M2 | DIASTOLIC BLOOD PRESSURE: 80 MMHG

## 2021-12-23 DIAGNOSIS — I26.99 PE (PULMONARY THROMBOEMBOLISM) (HCC): ICD-10-CM

## 2021-12-23 DIAGNOSIS — E11.65 UNCONTROLLED TYPE 2 DIABETES MELLITUS WITH HYPERGLYCEMIA, WITHOUT LONG-TERM CURRENT USE OF INSULIN (HCC): Primary | ICD-10-CM

## 2021-12-23 DIAGNOSIS — R10.9 FLANK PAIN: ICD-10-CM

## 2021-12-23 PROCEDURE — 3008F BODY MASS INDEX DOCD: CPT | Performed by: INTERNAL MEDICINE

## 2021-12-23 PROCEDURE — 3074F SYST BP LT 130 MM HG: CPT | Performed by: INTERNAL MEDICINE

## 2021-12-23 PROCEDURE — 99214 OFFICE O/P EST MOD 30 MIN: CPT | Performed by: INTERNAL MEDICINE

## 2021-12-23 PROCEDURE — 3079F DIAST BP 80-89 MM HG: CPT | Performed by: INTERNAL MEDICINE

## 2021-12-23 RX ORDER — DULAGLUTIDE 1.5 MG/.5ML
1.5 INJECTION, SOLUTION SUBCUTANEOUS WEEKLY
Qty: 2 ML | Refills: 0 | COMMUNITY
Start: 2021-12-23 | End: 2022-01-31

## 2021-12-23 RX ORDER — DULAGLUTIDE 0.75 MG/.5ML
0.75 INJECTION, SOLUTION SUBCUTANEOUS WEEKLY
Qty: 1 ML | Refills: 0 | COMMUNITY
Start: 2021-12-23 | End: 2022-01-04

## 2021-12-23 NOTE — PROGRESS NOTES
Patient Office Visit    ASSESSMENT AND PLAN:   (E11.65) Uncontrolled type 2 diabetes mellitus with hyperglycemia, without long-term current use of insulin ([facility]  (primary encounter diagnosis)  Plan: metFORMIN HCl 1000 MG Oral Tab, HEMOGLOBIN A1C,        Du thrombosis of superficial vessels of right lower extremity     Acute febrile illness     Thrombophlebitis of superficial veins of right lower extremity     E. coli gastroenteritis      Orders Placed This Encounter      Hemoglobin A1C          Standing Stat has been taking metformin and glipizide     Past Medical History:   Diagnosis Date   • Cellulitis and abscess of unspecified digit 6/19/2012   • Diabetes [facility])    • Disorder of thyroid    • DVT (deep venous thrombosis) (HCC)    • Dyslipidemia    • Gastric daily, Disp: 90 tablet, Rfl: 3    No current facility-administered medications on file prior to visit.         REVIEW OF SYSTEMS   Constitutional: no fatigue normal energy no weight changes   HENT: normal sinuses and no mouth issues   Eyes: . normal vision

## 2021-12-23 NOTE — PATIENT INSTRUCTIONS
I have ordered an ultrasound of the kidneys    Please take 1.06 of the trulicity for 2 weeks then 1.5 mg of the trulicity after that    See me back in 2 months    I will call sooner with the ultrasound results

## 2022-01-04 ENCOUNTER — APPOINTMENT (OUTPATIENT)
Dept: GENERAL RADIOLOGY | Facility: HOSPITAL | Age: 57
End: 2022-01-04
Attending: EMERGENCY MEDICINE
Payer: COMMERCIAL

## 2022-01-04 ENCOUNTER — APPOINTMENT (OUTPATIENT)
Dept: CT IMAGING | Facility: HOSPITAL | Age: 57
End: 2022-01-04
Attending: EMERGENCY MEDICINE
Payer: COMMERCIAL

## 2022-01-04 ENCOUNTER — HOSPITAL ENCOUNTER (EMERGENCY)
Facility: HOSPITAL | Age: 57
Discharge: HOME | End: 2022-01-04
Attending: EMERGENCY MEDICINE
Payer: COMMERCIAL

## 2022-01-04 ENCOUNTER — OFFICE VISIT (OUTPATIENT)
Dept: INTERNAL MEDICINE CLINIC | Facility: CLINIC | Age: 57
End: 2022-01-04
Payer: COMMERCIAL

## 2022-01-04 VITALS
SYSTOLIC BLOOD PRESSURE: 133 MMHG | BODY MASS INDEX: 45.52 KG/M2 | RESPIRATION RATE: 18 BRPM | DIASTOLIC BLOOD PRESSURE: 84 MMHG | TEMPERATURE: 98 F | OXYGEN SATURATION: 100 % | WEIGHT: 290 LBS | HEIGHT: 67 IN | HEART RATE: 83 BPM

## 2022-01-04 VITALS
OXYGEN SATURATION: 98 % | HEART RATE: 101 BPM | BODY MASS INDEX: 53.92 KG/M2 | DIASTOLIC BLOOD PRESSURE: 80 MMHG | HEIGHT: 62 IN | WEIGHT: 293 LBS | TEMPERATURE: 98 F | SYSTOLIC BLOOD PRESSURE: 110 MMHG | RESPIRATION RATE: 20 BRPM

## 2022-01-04 DIAGNOSIS — R07.9 CHEST PAIN OF UNCERTAIN ETIOLOGY: Primary | ICD-10-CM

## 2022-01-04 DIAGNOSIS — R06.02 SOB (SHORTNESS OF BREATH): Primary | ICD-10-CM

## 2022-01-04 LAB
ALBUMIN SERPL-MCNC: 3.2 G/DL (ref 3.4–5)
ALBUMIN/GLOB SERPL: 0.8 {RATIO} (ref 1–2)
ALP LIVER SERPL-CCNC: 131 U/L
ALT SERPL-CCNC: 24 U/L
ANION GAP SERPL CALC-SCNC: 6 MMOL/L (ref 0–18)
AST SERPL-CCNC: 16 U/L (ref 15–37)
BASOPHILS # BLD AUTO: 0.05 X10(3) UL (ref 0–0.2)
BASOPHILS NFR BLD AUTO: 0.7 %
BILIRUB SERPL-MCNC: 0.2 MG/DL (ref 0.1–2)
BILIRUB UR QL STRIP.AUTO: NEGATIVE
BUN BLD-MCNC: 18 MG/DL (ref 7–18)
CALCIUM BLD-MCNC: 9.3 MG/DL (ref 8.5–10.1)
CHLORIDE SERPL-SCNC: 105 MMOL/L (ref 98–112)
CLARITY UR REFRACT.AUTO: CLEAR
CO2 SERPL-SCNC: 26 MMOL/L (ref 21–32)
COLOR UR AUTO: YELLOW
CREAT BLD-MCNC: 0.69 MG/DL
EOSINOPHIL # BLD AUTO: 0.18 X10(3) UL (ref 0–0.7)
EOSINOPHIL NFR BLD AUTO: 2.6 %
ERYTHROCYTE [DISTWIDTH] IN BLOOD BY AUTOMATED COUNT: 14.4 %
GLOBULIN PLAS-MCNC: 4 G/DL (ref 2.8–4.4)
GLUCOSE BLD-MCNC: 198 MG/DL (ref 70–99)
GLUCOSE UR STRIP.AUTO-MCNC: 50 MG/DL
HCT VFR BLD AUTO: 41.3 %
HGB BLD-MCNC: 13.6 G/DL
IMM GRANULOCYTES # BLD AUTO: 0.03 X10(3) UL (ref 0–1)
IMM GRANULOCYTES NFR BLD: 0.4 %
KETONES UR STRIP.AUTO-MCNC: NEGATIVE MG/DL
LEUKOCYTE ESTERASE UR QL STRIP.AUTO: NEGATIVE
LYMPHOCYTES # BLD AUTO: 2.15 X10(3) UL (ref 1–4)
LYMPHOCYTES NFR BLD AUTO: 30.6 %
MCH RBC QN AUTO: 31.5 PG (ref 26–34)
MCHC RBC AUTO-ENTMCNC: 32.9 G/DL (ref 31–37)
MCV RBC AUTO: 95.6 FL
MONOCYTES # BLD AUTO: 0.65 X10(3) UL (ref 0.1–1)
MONOCYTES NFR BLD AUTO: 9.2 %
NEUTROPHILS # BLD AUTO: 3.97 X10 (3) UL (ref 1.5–7.7)
NEUTROPHILS # BLD AUTO: 3.97 X10(3) UL (ref 1.5–7.7)
NEUTROPHILS NFR BLD AUTO: 56.5 %
NITRITE UR QL STRIP.AUTO: NEGATIVE
NT-PROBNP SERPL-MCNC: 27 PG/ML (ref ?–125)
OSMOLALITY SERPL CALC.SUM OF ELEC: 291 MOSM/KG (ref 275–295)
PH UR STRIP.AUTO: 5 [PH] (ref 5–8)
PLATELET # BLD AUTO: 317 10(3)UL (ref 150–450)
POTASSIUM SERPL-SCNC: 4 MMOL/L (ref 3.5–5.1)
PROT SERPL-MCNC: 7.2 G/DL (ref 6.4–8.2)
PROT UR STRIP.AUTO-MCNC: NEGATIVE MG/DL
RBC # BLD AUTO: 4.32 X10(6)UL
RBC UR QL AUTO: NEGATIVE
SODIUM SERPL-SCNC: 137 MMOL/L (ref 136–145)
SP GR UR STRIP.AUTO: 1.01 (ref 1–1.03)
TROPONIN I HIGH SENSITIVITY: 7 NG/L
TROPONIN I HIGH SENSITIVITY: 8 NG/L
UROBILINOGEN UR STRIP.AUTO-MCNC: <2 MG/DL
WBC # BLD AUTO: 7 X10(3) UL (ref 4–11)

## 2022-01-04 PROCEDURE — 80053 COMPREHEN METABOLIC PANEL: CPT | Performed by: EMERGENCY MEDICINE

## 2022-01-04 PROCEDURE — 3079F DIAST BP 80-89 MM HG: CPT | Performed by: INTERNAL MEDICINE

## 2022-01-04 PROCEDURE — 99213 OFFICE O/P EST LOW 20 MIN: CPT | Performed by: INTERNAL MEDICINE

## 2022-01-04 PROCEDURE — 84484 ASSAY OF TROPONIN QUANT: CPT | Performed by: EMERGENCY MEDICINE

## 2022-01-04 PROCEDURE — 99285 EMERGENCY DEPT VISIT HI MDM: CPT

## 2022-01-04 PROCEDURE — 3074F SYST BP LT 130 MM HG: CPT | Performed by: INTERNAL MEDICINE

## 2022-01-04 PROCEDURE — 83880 ASSAY OF NATRIURETIC PEPTIDE: CPT | Performed by: EMERGENCY MEDICINE

## 2022-01-04 PROCEDURE — 71260 CT THORAX DX C+: CPT | Performed by: EMERGENCY MEDICINE

## 2022-01-04 PROCEDURE — 81003 URINALYSIS AUTO W/O SCOPE: CPT | Performed by: EMERGENCY MEDICINE

## 2022-01-04 PROCEDURE — 71045 X-RAY EXAM CHEST 1 VIEW: CPT | Performed by: EMERGENCY MEDICINE

## 2022-01-04 PROCEDURE — 3008F BODY MASS INDEX DOCD: CPT | Performed by: INTERNAL MEDICINE

## 2022-01-04 PROCEDURE — 85025 COMPLETE CBC W/AUTO DIFF WBC: CPT | Performed by: EMERGENCY MEDICINE

## 2022-01-04 PROCEDURE — 36415 COLL VENOUS BLD VENIPUNCTURE: CPT

## 2022-01-04 PROCEDURE — 93005 ELECTROCARDIOGRAM TRACING: CPT

## 2022-01-04 PROCEDURE — 93010 ELECTROCARDIOGRAM REPORT: CPT

## 2022-01-04 NOTE — PROGRESS NOTES
Patient Office Visit    ASSESSMENT AND PLAN:   (R06.02) SOB (shortness of breath)  (primary encounter diagnosis)  Plan: with \"lung pain. \" patient in mild distress and is diaphoretic.  She was recently hospitalized for Pulmonary embolism and covid pneumoni sudden left sided upper back pain    1. Left upper back pain:   - started 4 days ago and worsening   - having difficulty breathing   - it is very painful to any movement   - she was diagnosed with      Pulmonary embolism and covid     Pneumonia.  Overall sh MG/0.5ML Subcutaneous Solution Pen-injector, Inject 1.5 mg into the skin once a week., Disp: 2 mL, Rfl: 0  Dulaglutide (TRULICITY) 1.5 YH/6.0IK Subcutaneous Solution Pen-injector, Inject 1.5 mg into the skin once a week., Disp: 2 mL, Rfl: 0  glipiZIDE 10 M

## 2022-01-04 NOTE — ED INITIAL ASSESSMENT (HPI)
Pt to the ER from home for SOB. Pt states that two months ago she had Covid and developed blood clots. Pt also starts she has left flank pain. Pt presents to the ER a&ox4. Respirations even and non Labored. Skin warm and dry.  Pt appears to be non distr

## 2022-01-05 LAB
ATRIAL RATE: 84 BPM
P AXIS: 45 DEGREES
P-R INTERVAL: 184 MS
Q-T INTERVAL: 394 MS
QRS DURATION: 98 MS
QTC CALCULATION (BEZET): 465 MS
R AXIS: -32 DEGREES
T AXIS: 13 DEGREES
VENTRICULAR RATE: 84 BPM

## 2022-01-05 NOTE — ED PROVIDER NOTES
Patient Seen in: [facility] Emergency Department      History   Patient presents with:  Difficulty Breathing  Back Pain    Stated Complaint: hx of covid 2 months ago, here for pain to back left side, sob, \"i have pain to*    Subjective:   HPI    56 Use      Smoking status: Never Smoker      Smokeless tobacco: Never Used    Vaping Use      Vaping Use: Never used    Alcohol use: No    Drug use:  No             Review of Systems    Positive for stated complaint: hx of covid 2 months ago, here for pain to ---------                               -----------         ------                     CBC W/ DIFFERENTIAL[[id number]]                              Final result                 Please view results for these tests on the individual orders.    CBC W/ DIFF patient.                         Disposition and Plan     Clinical Impression:  Chest pain of uncertain etiology  (primary encounter diagnosis)     Disposition:  Discharge  1/4/2022 10:47 pm    Follow-up:  Ethan Walker DO  [address] [place] [address]  Marco A Smith

## 2022-01-10 ENCOUNTER — HOSPITAL ENCOUNTER (OUTPATIENT)
Dept: ULTRASOUND IMAGING | Age: 57
Discharge: HOME | End: 2022-01-10
Attending: INTERNAL MEDICINE
Payer: COMMERCIAL

## 2022-01-10 DIAGNOSIS — R10.9 FLANK PAIN: ICD-10-CM

## 2022-01-10 PROCEDURE — 76775 US EXAM ABDO BACK WALL LIM: CPT | Performed by: INTERNAL MEDICINE

## 2022-01-11 NOTE — PROGRESS NOTES
Dear RN, please let the patient know  1. Ultrasound of the kidneys are normal. How is she feeling?   32 [facility] DO

## 2022-01-13 ENCOUNTER — TELEPHONE (OUTPATIENT)
Dept: INTERNAL MEDICINE CLINIC | Facility: CLINIC | Age: 57
End: 2022-01-13

## 2022-01-13 NOTE — TELEPHONE ENCOUNTER
Pt walked in office and dropped off medical clearance form to be completed by dr. Jossue Rivera. Form placed in dr. Babak Rodrigez fax folder. Pt would like form to be faxed and provided fax number on document. Q:[phone].

## 2022-01-21 ENCOUNTER — TELEPHONE (OUTPATIENT)
Dept: INTERNAL MEDICINE CLINIC | Facility: CLINIC | Age: 57
End: 2022-01-21

## 2022-01-21 NOTE — TELEPHONE ENCOUNTER
Left message for patient's daughter/Loretta.  Please assist with scheduling follow-up appt per Dr. Rodríguez Epp message below upon return call, TY.

## 2022-01-21 NOTE — TELEPHONE ENCOUNTER
Please have patient follow up with me to discuss diabetes medicine options.  May need insulin  Marci George DO

## 2022-01-24 NOTE — TELEPHONE ENCOUNTER
Left message for patient's daughter Savanna Shah #2. Pt needs to schedule diabetes medication f/u visit with Dr. Claudia Deutsch.

## 2022-01-26 ENCOUNTER — TELEPHONE (OUTPATIENT)
Dept: INTERNAL MEDICINE CLINIC | Facility: CLINIC | Age: 57
End: 2022-01-26

## 2022-01-26 NOTE — TELEPHONE ENCOUNTER
Pt called office and stated last time she was seen in office by dr. Brett Hood on 1/4 she mentioned noticeable redness on R ankle. Pt stated when she woke up this morning she noticed more redness, tenderness and swollen R ankle.  Pt is worried and scheduled an O

## 2022-01-26 NOTE — TELEPHONE ENCOUNTER
Attempted to call patient to follow-up on message below. Phone kept ringing, unable to leave message at this time. Please transfer to triage when pt calls back, TY.      Also spoke with Dr. Brittany Davis (ok for diabetes management discussion during appt as well, s

## 2022-01-31 ENCOUNTER — OFFICE VISIT (OUTPATIENT)
Dept: INTERNAL MEDICINE CLINIC | Facility: CLINIC | Age: 57
End: 2022-01-31
Payer: COMMERCIAL

## 2022-01-31 ENCOUNTER — TELEPHONE (OUTPATIENT)
Dept: INTERNAL MEDICINE CLINIC | Facility: CLINIC | Age: 57
End: 2022-01-31

## 2022-01-31 VITALS
TEMPERATURE: 98 F | SYSTOLIC BLOOD PRESSURE: 116 MMHG | WEIGHT: 293 LBS | RESPIRATION RATE: 18 BRPM | OXYGEN SATURATION: 93 % | HEIGHT: 67 IN | HEART RATE: 107 BPM | DIASTOLIC BLOOD PRESSURE: 72 MMHG | BODY MASS INDEX: 45.99 KG/M2

## 2022-01-31 DIAGNOSIS — E66.01 CLASS 3 SEVERE OBESITY DUE TO EXCESS CALORIES WITH SERIOUS COMORBIDITY AND BODY MASS INDEX (BMI) OF 50.0 TO 59.9 IN ADULT (HCC): ICD-10-CM

## 2022-01-31 DIAGNOSIS — L03.115 CELLULITIS OF RIGHT LOWER EXTREMITY: Primary | ICD-10-CM

## 2022-01-31 DIAGNOSIS — E11.65 UNCONTROLLED TYPE 2 DIABETES MELLITUS WITH HYPERGLYCEMIA, WITHOUT LONG-TERM CURRENT USE OF INSULIN (HCC): ICD-10-CM

## 2022-01-31 PROCEDURE — 99214 OFFICE O/P EST MOD 30 MIN: CPT | Performed by: INTERNAL MEDICINE

## 2022-01-31 PROCEDURE — 3074F SYST BP LT 130 MM HG: CPT | Performed by: INTERNAL MEDICINE

## 2022-01-31 PROCEDURE — 3008F BODY MASS INDEX DOCD: CPT | Performed by: INTERNAL MEDICINE

## 2022-01-31 PROCEDURE — 3078F DIAST BP <80 MM HG: CPT | Performed by: INTERNAL MEDICINE

## 2022-01-31 RX ORDER — PEN NEEDLE, DIABETIC 31 GX5/16"
1 NEEDLE, DISPOSABLE MISCELLANEOUS DAILY
Qty: 90 EACH | Refills: 0 | Status: SHIPPED | OUTPATIENT
Start: 2022-01-31

## 2022-01-31 RX ORDER — SULFAMETHOXAZOLE AND TRIMETHOPRIM 800; 160 MG/1; MG/1
1 TABLET ORAL 2 TIMES DAILY
Qty: 14 TABLET | Refills: 0 | Status: SHIPPED | OUTPATIENT
Start: 2022-01-31 | End: 2022-02-07

## 2022-01-31 RX ORDER — INSULIN DEGLUDEC INJECTION 100 U/ML
INJECTION, SOLUTION SUBCUTANEOUS
Qty: 9 ML | Refills: 0 | Status: SHIPPED | OUTPATIENT
Start: 2022-01-31 | End: 2022-01-31

## 2022-01-31 RX ORDER — CEFADROXIL 500 MG/1
500 CAPSULE ORAL 2 TIMES DAILY
Qty: 14 CAPSULE | Refills: 0 | Status: SHIPPED | OUTPATIENT
Start: 2022-01-31 | End: 2022-02-07

## 2022-01-31 RX ORDER — INSULIN DEGLUDEC INJECTION 100 U/ML
INJECTION, SOLUTION SUBCUTANEOUS
Qty: 9 ML | Refills: 0 | Status: SHIPPED | OUTPATIENT
Start: 2022-01-31 | End: 2022-03-21

## 2022-01-31 NOTE — PATIENT INSTRUCTIONS
Please stop the clindamycin and start duricef with bactrim.  This combination has worked for you in the past. Renuka Griffin have taken keflex before which is similar to C/Casia 10 and have tolerated in the past.     Since trulicity is causing symptoms, we need to start

## 2022-01-31 NOTE — PROGRESS NOTES
Patient Office Visit    ASSESSMENT AND PLAN:   (L03.115) Cellulitis of right lower extremity  (primary encounter diagnosis)  Plan: cefadroxil 500 MG Oral Cap,         sulfamethoxazole-trimethoprim -160 MG         Oral Tab per tablet  Note: will stop diabetes mellitus with hyperglycemia, without long-term current use of insulin (HCC)     Hypercholesterolemia     Varicose veins of both lower extremities with complications     History of DVT (deep vein thrombosis)     History of cellulitis of skin with l due to Cheryl   - working on diet and lifestyle     Modification          Past Medical History:   Diagnosis Date   • Cellulitis and abscess of unspecified digit 6/19/2012   • Diabetes ([facility] [address]    • Disorder of thyroid    • DVT (deep venous thrombosis) ([facility] 10 MG Oral Tab, Take 1 tablet (10 mg total) by mouth 2 (two) times daily before meals. , Disp: 60 tablet, Rfl: 3  furosemide 20 MG Oral Tab, Take 1 tablet (20 mg total) by mouth 2 (two) times daily. , Disp: 180 tablet, Rfl: 0  Levothyroxine Sodium 50 MCG Ora

## 2022-01-31 NOTE — TELEPHONE ENCOUNTER
Patient has appt today with Dr. Vy Paulino.      Future Appointments   Date Time Provider Kingsley Thomas   1/31/2022  1:30 PM Roma Osorio,  [facility] [place]

## 2022-01-31 NOTE — TELEPHONE ENCOUNTER
Deep, [address]    Krishan oCrtez rx needs to include max dose per day in instructions    Escribe new RX

## 2022-02-03 ENCOUNTER — TELEPHONE (OUTPATIENT)
Dept: INTERNAL MEDICINE CLINIC | Facility: CLINIC | Age: 57
End: 2022-02-03

## 2022-02-03 NOTE — TELEPHONE ENCOUNTER
Spoke with patient, last seen in office with Dr. Cherie Hastings 1/31/22 for right lower leg cellutitis. Patient stated pain and redness to area has increased. No bleeding or drainage at this time, pt also denies fevers. Patient states pain is waking up her up throughout the night and the area is very warm to touch. Per SV \"Follow up if fevers or the erythema worsens (will need to go to the ER if it does worsen for IV antibiotics)\"  [facility] note from 1/31/22. Patient advised to proceed to emergency room at this time for further evaluation and will possibly need IV antibiotics. Patient agreeable at this time. I also spoke with patient's son/Winston with consent from patient to discuss current symptoms. Patient's son agreeable to transport patient to the ER at this time for further evaluation.

## 2022-02-03 NOTE — TELEPHONE ENCOUNTER
Pt calling because she is still experiencing burning and pain on her ankle and calf and she is not able to sleep. Wants to know if she can take     clindamycin 300 MG Oral Cap     sulfamethoxazole-trimethoprim -160 MG Oral Tab per tablet    cefadroxil 500 MG Oral Cap    All at the same time. She stopped taking the clindamycin, when she started taking the other 2 medications. She states she feels no relief.

## 2022-02-09 NOTE — TELEPHONE ENCOUNTER
Karishma Hall from [facility] called notifying [facility] [address]. pt was admitted to [facility] 2/4 or 2/3 and discharged 2/7. She states she was in the hospital for cellulitis on lower right limb.     MEGAN

## 2022-02-11 NOTE — TELEPHONE ENCOUNTER
Pt daughter Leslie Uribe requesting needles for pt insulin to be sent to pharmacy. Pt has tresiba flex touch.      [facility] [place], [address] [phone], [phone]

## 2022-02-14 RX ORDER — PEN NEEDLE, DIABETIC 31 GX5/16"
1 NEEDLE, DISPOSABLE MISCELLANEOUS DAILY
Qty: 90 EACH | Refills: 0 | Status: SHIPPED | OUTPATIENT
Start: 2022-02-14 | End: 2022-03-21

## 2022-03-04 RX ORDER — GLIPIZIDE 10 MG/1
TABLET ORAL
Qty: 60 TABLET | Refills: 0 | Status: SHIPPED | OUTPATIENT
Start: 2022-03-04

## 2022-03-14 ENCOUNTER — TELEPHONE (OUTPATIENT)
Dept: INTERNAL MEDICINE CLINIC | Facility: CLINIC | Age: 57
End: 2022-03-14

## 2022-03-14 NOTE — TELEPHONE ENCOUNTER
Pt came derian the office and dropped medical clearance form to be completed asap. Forms placed in [address] fax folder.      Pt requesting we call her son Dedra Lefort because her phone is not working- [phone]

## 2022-03-14 NOTE — TELEPHONE ENCOUNTER
Pt came into the office requesting an order for a mammogram.    Pt requests we call her son Sandra Avelar when order is placed because her phone is not working.   Ph: [phone]

## 2022-03-15 NOTE — TELEPHONE ENCOUNTER
Received a Request for Medical Oliva Rico form that patient dropped off, placed in Dr Chad Fish in  Inc

## 2022-03-16 NOTE — TELEPHONE ENCOUNTER
Patient due for another A1c.  Once she is done I can review it and fill out the forms  Richy Dsouza DO

## 2022-03-18 NOTE — TELEPHONE ENCOUNTER
Spoke with patient and she is aware she has to get blood work done first, she has appointment on Monday.  3/21/22

## 2022-03-21 ENCOUNTER — LAB ENCOUNTER (OUTPATIENT)
Dept: LAB | Age: 57
End: 2022-03-21
Attending: INTERNAL MEDICINE
Payer: COMMERCIAL

## 2022-03-21 ENCOUNTER — OFFICE VISIT (OUTPATIENT)
Dept: INTERNAL MEDICINE CLINIC | Facility: CLINIC | Age: 57
End: 2022-03-21
Payer: COMMERCIAL

## 2022-03-21 VITALS
DIASTOLIC BLOOD PRESSURE: 84 MMHG | BODY MASS INDEX: 45.99 KG/M2 | HEIGHT: 67 IN | WEIGHT: 293 LBS | OXYGEN SATURATION: 97 % | RESPIRATION RATE: 20 BRPM | SYSTOLIC BLOOD PRESSURE: 126 MMHG | TEMPERATURE: 98 F | HEART RATE: 94 BPM

## 2022-03-21 DIAGNOSIS — L65.9 HAIR LOSS: ICD-10-CM

## 2022-03-21 DIAGNOSIS — E03.9 HYPOTHYROIDISM, UNSPECIFIED TYPE: ICD-10-CM

## 2022-03-21 DIAGNOSIS — E11.65 UNCONTROLLED TYPE 2 DIABETES MELLITUS WITH HYPERGLYCEMIA, WITHOUT LONG-TERM CURRENT USE OF INSULIN (HCC): ICD-10-CM

## 2022-03-21 DIAGNOSIS — L03.115 CELLULITIS OF RIGHT LOWER EXTREMITY: Primary | ICD-10-CM

## 2022-03-21 LAB
BASOPHILS # BLD AUTO: 0.03 X10(3) UL (ref 0–0.2)
BASOPHILS NFR BLD AUTO: 0.4 %
CREAT UR-SCNC: 27.5 MG/DL
DEPRECATED HBV CORE AB SER IA-ACNC: 63.3 NG/ML
EOSINOPHIL NFR BLD AUTO: 1.5 %
ERYTHROCYTE [DISTWIDTH] IN BLOOD BY AUTOMATED COUNT: 13.3 %
EST. AVERAGE GLUCOSE BLD GHB EST-MCNC: 206 MG/DL (ref 68–126)
HBA1C MFR BLD: 8.8 % (ref ?–5.7)
HCT VFR BLD AUTO: 40.8 %
HGB BLD-MCNC: 13.4 G/DL
IMM GRANULOCYTES # BLD AUTO: 0.02 X10(3) UL (ref 0–1)
IMM GRANULOCYTES NFR BLD: 0.3 %
LYMPHOCYTES # BLD AUTO: 2.3 X10(3) UL (ref 1–4)
LYMPHOCYTES NFR BLD AUTO: 29.4 %
MCH RBC QN AUTO: 31.2 PG (ref 26–34)
MCHC RBC AUTO-ENTMCNC: 32.8 G/DL (ref 31–37)
MCV RBC AUTO: 95.1 FL
MICROALBUMIN UR-MCNC: <0.5 MG/DL
MONOCYTES # BLD AUTO: 0.5 X10(3) UL (ref 0.1–1)
MONOCYTES NFR BLD AUTO: 6.4 %
NEUTROPHILS # BLD AUTO: 4.85 X10 (3) UL (ref 1.5–7.7)
NEUTROPHILS # BLD AUTO: 4.85 X10(3) UL (ref 1.5–7.7)
NEUTROPHILS NFR BLD AUTO: 62 %
PLATELET # BLD AUTO: 362 10(3)UL (ref 150–450)
RBC # BLD AUTO: 4.29 X10(6)UL
WBC # BLD AUTO: 7.8 X10(3) UL (ref 4–11)

## 2022-03-21 PROCEDURE — 84443 ASSAY THYROID STIM HORMONE: CPT | Performed by: INTERNAL MEDICINE

## 2022-03-21 PROCEDURE — 82570 ASSAY OF URINE CREATININE: CPT | Performed by: INTERNAL MEDICINE

## 2022-03-21 PROCEDURE — 83036 HEMOGLOBIN GLYCOSYLATED A1C: CPT | Performed by: INTERNAL MEDICINE

## 2022-03-21 PROCEDURE — 3074F SYST BP LT 130 MM HG: CPT | Performed by: INTERNAL MEDICINE

## 2022-03-21 PROCEDURE — 3079F DIAST BP 80-89 MM HG: CPT | Performed by: INTERNAL MEDICINE

## 2022-03-21 PROCEDURE — 82043 UR ALBUMIN QUANTITATIVE: CPT | Performed by: INTERNAL MEDICINE

## 2022-03-21 PROCEDURE — 99214 OFFICE O/P EST MOD 30 MIN: CPT | Performed by: INTERNAL MEDICINE

## 2022-03-21 PROCEDURE — 85025 COMPLETE CBC W/AUTO DIFF WBC: CPT | Performed by: INTERNAL MEDICINE

## 2022-03-21 PROCEDURE — 82728 ASSAY OF FERRITIN: CPT | Performed by: INTERNAL MEDICINE

## 2022-03-21 PROCEDURE — 3008F BODY MASS INDEX DOCD: CPT | Performed by: INTERNAL MEDICINE

## 2022-03-21 RX ORDER — GARLIC EXTRACT 500 MG
1 CAPSULE ORAL DAILY
COMMUNITY

## 2022-03-21 RX ORDER — INSULIN DEGLUDEC INJECTION 100 U/ML
INJECTION, SOLUTION SUBCUTANEOUS
Qty: 9 ML | Refills: 0 | Status: SHIPPED | OUTPATIENT
Start: 2022-03-21

## 2022-03-21 RX ORDER — PEN NEEDLE, DIABETIC 31 GX5/16"
1 NEEDLE, DISPOSABLE MISCELLANEOUS DAILY
Qty: 90 EACH | Refills: 0 | Status: SHIPPED | OUTPATIENT
Start: 2022-03-21

## 2022-03-21 RX ORDER — LEVOTHYROXINE SODIUM 0.05 MG/1
TABLET ORAL
Qty: 90 TABLET | Refills: 3 | Status: SHIPPED | OUTPATIENT
Start: 2022-03-21

## 2022-03-21 RX ORDER — LINEZOLID 600 MG/1
1 TABLET, FILM COATED ORAL EVERY 12 HOURS
COMMUNITY
Start: 2022-02-07

## 2022-03-21 NOTE — PATIENT INSTRUCTIONS
I have ordered blood test and urine test    If your glucose is above 150, please increase your insulin by 2 units every 3 days until your glucose is less than 150    Keep your legs elevated and wear compression stockings to avoid cellulitis    Once I have our blood test results I will fill out the dentist form    See me back end of April for physical

## 2022-03-23 NOTE — PROGRESS NOTES
Dear RN, please let the patient know   1. Urine test is normal  2. Diabetes has improved therefore she can go ahead with the dental procedure. I am out of office for a few days so will sign the forms once I return  3. Thyroid, iron and blood count are normal. The hair loss could be due to recent antibiotics.  It should improve, but if it doesn't I can send her to dermatology  Please let me know if you have any questions  [address] [place] [address] DO

## 2022-03-28 NOTE — TELEPHONE ENCOUNTER
----- Message from Rochelle Sena RN sent at 3/24/2022  3:54 PM CDT -----  Spoke with patient discussed test results;  1. Urine test is normal  2. Diabetes has improved therefore she can go ahead with the dental procedure. I am out of office for a few days so will sign the forms once I return  3. Thyroid, iron and blood count are normal. The hair loss could be due to recent antibiotics. It should improve, but if it doesn't I can send her to dermatology    Patient continues to have hair loss, she would like referral. Please advise.

## 2022-03-28 NOTE — TELEPHONE ENCOUNTER
Faxed medical elen form to Dentist with a copy to scan and one in blue folder pod @2 .   Also LVM with son that it was completed

## 2022-04-13 RX ORDER — GLIPIZIDE 10 MG/1
TABLET ORAL
Qty: 60 TABLET | Refills: 0 | Status: SHIPPED | OUTPATIENT
Start: 2022-04-13

## 2022-05-19 DIAGNOSIS — E11.65 UNCONTROLLED TYPE 2 DIABETES MELLITUS WITH HYPERGLYCEMIA, WITHOUT LONG-TERM CURRENT USE OF INSULIN (HCC): ICD-10-CM

## 2022-05-24 DIAGNOSIS — E03.9 HYPOTHYROIDISM, UNSPECIFIED TYPE: ICD-10-CM

## 2022-05-24 RX ORDER — LEVOTHYROXINE SODIUM 0.05 MG/1
TABLET ORAL
Qty: 90 TABLET | Refills: 3 | OUTPATIENT
Start: 2022-05-24

## 2022-05-24 NOTE — TELEPHONE ENCOUNTER
Pt requesting refill:    levothyroxine 50 MCG Oral Tab    [facility] [facility], [phone]

## 2022-06-09 DIAGNOSIS — E11.65 UNCONTROLLED TYPE 2 DIABETES MELLITUS WITH HYPERGLYCEMIA, WITHOUT LONG-TERM CURRENT USE OF INSULIN (HCC): ICD-10-CM

## 2022-06-10 RX ORDER — GLIPIZIDE 10 MG/1
TABLET ORAL
Qty: 60 TABLET | Refills: 0 | Status: SHIPPED | OUTPATIENT
Start: 2022-06-10

## 2022-06-27 ENCOUNTER — TELEPHONE (OUTPATIENT)
Dept: INTERNAL MEDICINE CLINIC | Facility: CLINIC | Age: 57
End: 2022-06-27

## 2022-06-27 ENCOUNTER — HOSPITAL ENCOUNTER (OUTPATIENT)
Dept: CT IMAGING | Age: 57
Discharge: HOME | End: 2022-06-27
Attending: INTERNAL MEDICINE
Payer: COMMERCIAL

## 2022-06-27 ENCOUNTER — OFFICE VISIT (OUTPATIENT)
Dept: INTERNAL MEDICINE CLINIC | Facility: CLINIC | Age: 57
End: 2022-06-27
Payer: COMMERCIAL

## 2022-06-27 VITALS
TEMPERATURE: 99 F | SYSTOLIC BLOOD PRESSURE: 134 MMHG | HEIGHT: 67 IN | HEART RATE: 97 BPM | BODY MASS INDEX: 45.99 KG/M2 | WEIGHT: 293 LBS | DIASTOLIC BLOOD PRESSURE: 70 MMHG | RESPIRATION RATE: 24 BRPM | OXYGEN SATURATION: 98 %

## 2022-06-27 DIAGNOSIS — E11.65 UNCONTROLLED TYPE 2 DIABETES MELLITUS WITH HYPERGLYCEMIA, WITHOUT LONG-TERM CURRENT USE OF INSULIN (HCC): ICD-10-CM

## 2022-06-27 DIAGNOSIS — K11.5 SALIVARY GLAND CALCULI: ICD-10-CM

## 2022-06-27 DIAGNOSIS — K11.5 SALIVARY GLAND CALCULI: Primary | ICD-10-CM

## 2022-06-27 DIAGNOSIS — E03.9 HYPOTHYROIDISM, UNSPECIFIED TYPE: ICD-10-CM

## 2022-06-27 RX ORDER — INSULIN DEGLUDEC INJECTION 100 U/ML
INJECTION, SOLUTION SUBCUTANEOUS
Qty: 9 ML | Refills: 0 | Status: SHIPPED | OUTPATIENT
Start: 2022-06-27 | End: 2022-06-27

## 2022-06-27 RX ORDER — GLIPIZIDE 10 MG/1
10 TABLET ORAL
Qty: 180 TABLET | Refills: 1 | Status: SHIPPED | OUTPATIENT
Start: 2022-06-27

## 2022-06-27 RX ORDER — LEVOTHYROXINE SODIUM 0.05 MG/1
TABLET ORAL
Qty: 90 TABLET | Refills: 3 | Status: SHIPPED | OUTPATIENT
Start: 2022-06-27

## 2022-06-27 RX ORDER — CLINDAMYCIN HYDROCHLORIDE 300 MG/1
300 CAPSULE ORAL 3 TIMES DAILY
Qty: 21 CAPSULE | Refills: 0 | Status: SHIPPED | OUTPATIENT
Start: 2022-06-27 | End: 2022-07-04

## 2022-06-27 RX ORDER — INSULIN DEGLUDEC INJECTION 100 U/ML
INJECTION, SOLUTION SUBCUTANEOUS
Qty: 15 ML | Refills: 0 | Status: SHIPPED | OUTPATIENT
Start: 2022-06-27

## 2022-06-27 NOTE — PATIENT INSTRUCTIONS
Please see ENT doctor below. I have ordered an urgent CT scan. If no improvement with the antibiotics (clindamycin) then get the CT scan. Start the antibiotic. ENT doctor can see you on Wednesday.  If fevers or worsening pain then ER  I have refilled all of your medications    ENT - Dr Jackson Cota - Appointment at 9:15 am                                                                                                                            Address: [address] [place] [address],[phone], [place], [address]  Phone: [phone]    You will be due for blood tests for your diabetes you can have it done whenever you have a chance

## 2022-06-27 NOTE — TELEPHONE ENCOUNTER
Incoming call from pharmacy  Insulin Degludec (TRESIBA FLEXTOUCH) 100 UNIT/ML Subcutaneous Solution Pen-injector    Quantity sent for 9mL   Smallest box comes in for 15mL    Requesting to send for 15mL

## 2022-06-30 ENCOUNTER — LAB ENCOUNTER (OUTPATIENT)
Dept: LAB | Age: 57
End: 2022-06-30
Attending: INTERNAL MEDICINE
Payer: COMMERCIAL

## 2022-06-30 ENCOUNTER — TELEPHONE (OUTPATIENT)
Dept: INTERNAL MEDICINE CLINIC | Facility: CLINIC | Age: 57
End: 2022-06-30

## 2022-06-30 ENCOUNTER — HOSPITAL ENCOUNTER (OUTPATIENT)
Dept: CT IMAGING | Age: 57
Discharge: HOME | End: 2022-06-30
Attending: INTERNAL MEDICINE
Payer: COMMERCIAL

## 2022-06-30 DIAGNOSIS — E11.65 UNCONTROLLED TYPE 2 DIABETES MELLITUS WITH HYPERGLYCEMIA, WITHOUT LONG-TERM CURRENT USE OF INSULIN (HCC): ICD-10-CM

## 2022-06-30 LAB
ALT SERPL-CCNC: 23 U/L
ANION GAP SERPL CALC-SCNC: 7 MMOL/L (ref 0–18)
AST SERPL-CCNC: 18 U/L (ref 15–37)
BUN BLD-MCNC: 14 MG/DL (ref 7–18)
CALCIUM BLD-MCNC: 8.9 MG/DL (ref 8.5–10.1)
CHLORIDE SERPL-SCNC: 105 MMOL/L (ref 98–112)
CHOLEST SERPL-MCNC: 208 MG/DL (ref ?–200)
CO2 SERPL-SCNC: 24 MMOL/L (ref 21–32)
CREAT BLD-MCNC: 0.78 MG/DL
EST. AVERAGE GLUCOSE BLD GHB EST-MCNC: 298 MG/DL (ref 68–126)
FASTING PATIENT LIPID ANSWER: YES
FASTING STATUS PATIENT QL REPORTED: YES
GLUCOSE BLD-MCNC: 271 MG/DL (ref 70–99)
HBA1C MFR BLD: 12 % (ref ?–5.7)
HDLC SERPL-MCNC: 58 MG/DL (ref 40–59)
LDLC SERPL CALC-MCNC: 129 MG/DL (ref ?–100)
NONHDLC SERPL-MCNC: 150 MG/DL (ref ?–130)
OSMOLALITY SERPL CALC.SUM OF ELEC: 292 MOSM/KG (ref 275–295)
POTASSIUM SERPL-SCNC: 4.3 MMOL/L (ref 3.5–5.1)
SODIUM SERPL-SCNC: 136 MMOL/L (ref 136–145)
TRIGL SERPL-MCNC: 121 MG/DL (ref 30–149)
VLDLC SERPL CALC-MCNC: 22 MG/DL (ref 0–30)

## 2022-06-30 PROCEDURE — 84460 ALANINE AMINO (ALT) (SGPT): CPT | Performed by: INTERNAL MEDICINE

## 2022-06-30 PROCEDURE — 80061 LIPID PANEL: CPT | Performed by: INTERNAL MEDICINE

## 2022-06-30 PROCEDURE — 80048 BASIC METABOLIC PNL TOTAL CA: CPT | Performed by: INTERNAL MEDICINE

## 2022-06-30 PROCEDURE — 70490 CT SOFT TISSUE NECK W/O DYE: CPT | Performed by: INTERNAL MEDICINE

## 2022-06-30 PROCEDURE — 84450 TRANSFERASE (AST) (SGOT): CPT | Performed by: INTERNAL MEDICINE

## 2022-06-30 PROCEDURE — 83036 HEMOGLOBIN GLYCOSYLATED A1C: CPT | Performed by: INTERNAL MEDICINE

## 2022-07-01 DIAGNOSIS — E11.65 UNCONTROLLED TYPE 2 DIABETES MELLITUS WITH HYPERGLYCEMIA, WITHOUT LONG-TERM CURRENT USE OF INSULIN (HCC): Primary | ICD-10-CM

## 2022-07-01 DIAGNOSIS — E78.2 MIXED HYPERLIPIDEMIA: ICD-10-CM

## 2022-07-01 RX ORDER — ATORVASTATIN CALCIUM 10 MG/1
10 TABLET, FILM COATED ORAL DAILY
Qty: 90 TABLET | Refills: 0 | Status: SHIPPED | OUTPATIENT
Start: 2022-07-01 | End: 2023-06-26

## 2022-07-01 NOTE — TELEPHONE ENCOUNTER
Thank you. Can we confirm with the patient if she followed up with the ENT doctor I scheduled her with. Also let her know that no infection was seen however 2 stones were noted in the salivary gland.  She should continue the antibiotics as discussed unless told not to by ENT    [address] [place] [address]

## 2022-07-01 NOTE — PROGRESS NOTES
Dear RN, please let the patient know   1. Diabetes is uncontrolled. She should increase her insulin to 14 units from 12 units and to check her glucose before breakfast and before her meals. She should report the readings to me in 2 weeks. I have also referred her to the diabetes educator for better sugar control  2. Her cholesterol is high and I have ordered a cholesterol medication called lipitor. We will start off with a low dose and repeat blood tests in 3 months  3.  Kidney and liver function are normal  Please let me know if you have any questions  Marci Berry DO

## 2022-07-01 NOTE — TELEPHONE ENCOUNTER
MEGAN    Spoke with patient-stated she did follow up with ENT. He advised her to complete the antibiotic prescribed by Dr. Marcia Lagunas first and then start a different antibiotic that he prescribed. Per patient, she is following up with ENT next week. Relayed results of CT scan. Patient verbalized understanding.

## 2022-07-27 ENCOUNTER — TELEPHONE (OUTPATIENT)
Dept: ENDOCRINOLOGY CLINIC | Facility: CLINIC | Age: 57
End: 2022-07-27

## 2022-09-06 ENCOUNTER — TELEPHONE (OUTPATIENT)
Dept: INTERNAL MEDICINE CLINIC | Facility: CLINIC | Age: 57
End: 2022-09-06

## 2022-09-06 DIAGNOSIS — R60.0 LOWER EXTREMITY EDEMA: Primary | ICD-10-CM

## 2022-09-06 NOTE — TELEPHONE ENCOUNTER
SV - FYI    While speaking with pt's daughter Shmuel Hernandez (on verbal) about another family member, daughter asked a question that pt had recently. Pt wanted to know if she was supposed to still be taking Eliquis - informed Shmuel Hernandez that it is no longer on her active medication list. Jillian Gave stated pt was only on it while in the hospital, she just wanted to make sure.)    Informed daughter that Elsie Garcia RN, CDE had attempted to reach pt about more education - but there was no answer. Daughter stated that the home phone has been disconnected - removed that number from the record. Confirmed the rest of the phone #s are correct for reaching the patient. Daughter will have pt call to schedule an appointment.

## 2022-09-07 RX ORDER — FUROSEMIDE 20 MG/1
20 TABLET ORAL DAILY PRN
Qty: 30 TABLET | Refills: 0 | Status: SHIPPED | OUTPATIENT
Start: 2022-09-07

## 2022-09-07 RX ORDER — POTASSIUM CHLORIDE 750 MG/1
TABLET, FILM COATED, EXTENDED RELEASE ORAL
Qty: 30 TABLET | Refills: 0 | Status: SHIPPED | OUTPATIENT
Start: 2022-09-07

## 2022-09-07 NOTE — TELEPHONE ENCOUNTER
No need for eliquis as that was for a Pulmonary embolism that was provoked due to [facility] DO Return in 1 week if still symptoms  Complete mammography as ordered    Return at scheduled appointment

## 2022-09-07 NOTE — TELEPHONE ENCOUNTER
Relayed to patient's daughter Jamel Berkowitz, during conversation she also mentioned patient has been having edema in lower legs. She states patient was on a diuretic in the past are you willing to prescribe? Daughter was informed that patient is overdue for annual physical, recommended she schedule an appointment for SV to assess in person. Daughter agreeable will get patient's schedule and call back to set up.

## 2022-09-07 NOTE — TELEPHONE ENCOUNTER
I have ordered the prescription, but needs an appointment as well  [address] [place] [address] DO

## 2022-09-28 ENCOUNTER — OFFICE VISIT (OUTPATIENT)
Dept: INTERNAL MEDICINE CLINIC | Facility: CLINIC | Age: 57
End: 2022-09-28

## 2022-09-28 VITALS
WEIGHT: 291.81 LBS | BODY MASS INDEX: 45.8 KG/M2 | DIASTOLIC BLOOD PRESSURE: 70 MMHG | TEMPERATURE: 98 F | HEIGHT: 67 IN | OXYGEN SATURATION: 97 % | HEART RATE: 70 BPM | SYSTOLIC BLOOD PRESSURE: 112 MMHG | RESPIRATION RATE: 16 BRPM

## 2022-09-28 DIAGNOSIS — Z79.4 UNCONTROLLED TYPE 2 DIABETES MELLITUS WITH HYPERGLYCEMIA, WITH LONG-TERM CURRENT USE OF INSULIN (HCC): ICD-10-CM

## 2022-09-28 DIAGNOSIS — K04.7 DENTAL INFECTION: Primary | ICD-10-CM

## 2022-09-28 DIAGNOSIS — R60.0 BILATERAL LOWER EXTREMITY EDEMA: ICD-10-CM

## 2022-09-28 DIAGNOSIS — E11.65 UNCONTROLLED TYPE 2 DIABETES MELLITUS WITH HYPERGLYCEMIA, WITH LONG-TERM CURRENT USE OF INSULIN (HCC): ICD-10-CM

## 2022-09-28 DIAGNOSIS — R22.0 FACIAL SWELLING: ICD-10-CM

## 2022-09-28 PROCEDURE — 99214 OFFICE O/P EST MOD 30 MIN: CPT | Performed by: PHYSICIAN ASSISTANT

## 2022-09-28 PROCEDURE — 3078F DIAST BP <80 MM HG: CPT | Performed by: PHYSICIAN ASSISTANT

## 2022-09-28 PROCEDURE — 3074F SYST BP LT 130 MM HG: CPT | Performed by: PHYSICIAN ASSISTANT

## 2022-09-28 PROCEDURE — 3008F BODY MASS INDEX DOCD: CPT | Performed by: PHYSICIAN ASSISTANT

## 2022-09-28 RX ORDER — IBUPROFEN 800 MG/1
TABLET ORAL
COMMUNITY
Start: 2022-09-13

## 2022-09-28 RX ORDER — CLINDAMYCIN HYDROCHLORIDE 150 MG/1
CAPSULE ORAL
COMMUNITY
Start: 2022-09-13

## 2022-09-28 RX ORDER — HYDROCODONE BITARTRATE AND ACETAMINOPHEN 5; 325 MG/1; MG/1
1 TABLET ORAL EVERY 8 HOURS PRN
Qty: 9 TABLET | Refills: 0 | Status: SHIPPED | OUTPATIENT
Start: 2022-09-28

## 2022-10-10 ENCOUNTER — LAB ENCOUNTER (OUTPATIENT)
Dept: LAB | Age: 57
End: 2022-10-10
Attending: INTERNAL MEDICINE
Payer: COMMERCIAL

## 2022-10-10 ENCOUNTER — OFFICE VISIT (OUTPATIENT)
Dept: INTERNAL MEDICINE CLINIC | Facility: CLINIC | Age: 57
End: 2022-10-10
Payer: COMMERCIAL

## 2022-10-10 VITALS
SYSTOLIC BLOOD PRESSURE: 126 MMHG | RESPIRATION RATE: 18 BRPM | HEIGHT: 67 IN | WEIGHT: 290 LBS | HEART RATE: 76 BPM | OXYGEN SATURATION: 97 % | DIASTOLIC BLOOD PRESSURE: 80 MMHG | BODY MASS INDEX: 45.52 KG/M2 | TEMPERATURE: 98 F

## 2022-10-10 DIAGNOSIS — Z79.4 UNCONTROLLED TYPE 2 DIABETES MELLITUS WITH HYPERGLYCEMIA, WITH LONG-TERM CURRENT USE OF INSULIN (HCC): ICD-10-CM

## 2022-10-10 DIAGNOSIS — E11.65 UNCONTROLLED TYPE 2 DIABETES MELLITUS WITH HYPERGLYCEMIA, WITHOUT LONG-TERM CURRENT USE OF INSULIN (HCC): ICD-10-CM

## 2022-10-10 DIAGNOSIS — N76.0 ACUTE VAGINITIS: Primary | ICD-10-CM

## 2022-10-10 DIAGNOSIS — E11.65 UNCONTROLLED TYPE 2 DIABETES MELLITUS WITH HYPERGLYCEMIA, WITH LONG-TERM CURRENT USE OF INSULIN (HCC): ICD-10-CM

## 2022-10-10 DIAGNOSIS — Z98.890 HISTORY OF RECENT DENTAL PROCEDURE: ICD-10-CM

## 2022-10-10 DIAGNOSIS — E78.2 MIXED HYPERLIPIDEMIA: ICD-10-CM

## 2022-10-10 DIAGNOSIS — B37.31 CANDIDAL VULVITIS: ICD-10-CM

## 2022-10-10 LAB
ALT SERPL-CCNC: 21 U/L
AST SERPL-CCNC: 18 U/L (ref 15–37)
CHOLEST SERPL-MCNC: 152 MG/DL (ref ?–200)
EST. AVERAGE GLUCOSE BLD GHB EST-MCNC: 344 MG/DL (ref 68–126)
FASTING PATIENT LIPID ANSWER: YES
HBA1C MFR BLD: 13.6 % (ref ?–5.7)
HDLC SERPL-MCNC: 52 MG/DL (ref 40–59)
LDLC SERPL CALC-MCNC: 82 MG/DL (ref ?–100)
NONHDLC SERPL-MCNC: 100 MG/DL (ref ?–130)
TRIGL SERPL-MCNC: 96 MG/DL (ref 30–149)
VLDLC SERPL CALC-MCNC: 15 MG/DL (ref 0–30)

## 2022-10-10 PROCEDURE — 80061 LIPID PANEL: CPT | Performed by: INTERNAL MEDICINE

## 2022-10-10 PROCEDURE — 83036 HEMOGLOBIN GLYCOSYLATED A1C: CPT | Performed by: INTERNAL MEDICINE

## 2022-10-10 PROCEDURE — 87510 GARDNER VAG DNA DIR PROBE: CPT | Performed by: PHYSICIAN ASSISTANT

## 2022-10-10 PROCEDURE — 87660 TRICHOMONAS VAGIN DIR PROBE: CPT | Performed by: PHYSICIAN ASSISTANT

## 2022-10-10 PROCEDURE — 87480 CANDIDA DNA DIR PROBE: CPT | Performed by: PHYSICIAN ASSISTANT

## 2022-10-10 PROCEDURE — 84450 TRANSFERASE (AST) (SGOT): CPT | Performed by: INTERNAL MEDICINE

## 2022-10-10 PROCEDURE — 3046F HEMOGLOBIN A1C LEVEL >9.0%: CPT | Performed by: INTERNAL MEDICINE

## 2022-10-10 PROCEDURE — 84460 ALANINE AMINO (ALT) (SGPT): CPT | Performed by: INTERNAL MEDICINE

## 2022-10-10 RX ORDER — CLOTRIMAZOLE AND BETAMETHASONE DIPROPIONATE 10; .64 MG/G; MG/G
CREAM TOPICAL
Qty: 45 G | Refills: 0 | Status: SHIPPED | OUTPATIENT
Start: 2022-10-10

## 2022-10-10 RX ORDER — FLUCONAZOLE 150 MG/1
TABLET ORAL
Qty: 2 TABLET | Refills: 0 | Status: SHIPPED | OUTPATIENT
Start: 2022-10-10

## 2022-10-10 RX ORDER — PILOCARPINE HYDROCHLORIDE 5 MG/1
5 TABLET, FILM COATED ORAL 3 TIMES DAILY
COMMUNITY

## 2022-10-10 RX ORDER — APIXABAN 5 MG/1
5 TABLET, FILM COATED ORAL 2 TIMES DAILY
COMMUNITY
Start: 2022-08-25 | End: 2022-10-10

## 2022-10-11 NOTE — PROGRESS NOTES
Dear RN, please let the patient know   Her diabetes is very uncontrolled, we will need to adjust her insulin (please have her schedule an appointment with me) and she needs to see the diabetes educator    Cholesterol is controlled    Liver tests are normal  Marci Delaney DO

## 2022-10-17 ENCOUNTER — TELEPHONE (OUTPATIENT)
Dept: INTERNAL MEDICINE CLINIC | Facility: CLINIC | Age: 57
End: 2022-10-17

## 2022-10-17 ENCOUNTER — OFFICE VISIT (OUTPATIENT)
Dept: INTERNAL MEDICINE CLINIC | Facility: CLINIC | Age: 57
End: 2022-10-17
Payer: COMMERCIAL

## 2022-10-17 VITALS
SYSTOLIC BLOOD PRESSURE: 140 MMHG | HEART RATE: 54 BPM | BODY MASS INDEX: 44.89 KG/M2 | WEIGHT: 286 LBS | RESPIRATION RATE: 16 BRPM | TEMPERATURE: 99 F | HEIGHT: 67 IN | OXYGEN SATURATION: 100 % | DIASTOLIC BLOOD PRESSURE: 90 MMHG

## 2022-10-17 DIAGNOSIS — E11.65 UNCONTROLLED TYPE 2 DIABETES MELLITUS WITH HYPERGLYCEMIA, WITHOUT LONG-TERM CURRENT USE OF INSULIN (HCC): ICD-10-CM

## 2022-10-17 DIAGNOSIS — R03.0 ELEVATED BP WITHOUT DIAGNOSIS OF HYPERTENSION: ICD-10-CM

## 2022-10-17 DIAGNOSIS — E78.00 HYPERCHOLESTEROLEMIA: ICD-10-CM

## 2022-10-17 DIAGNOSIS — E03.9 HYPOTHYROIDISM, UNSPECIFIED TYPE: ICD-10-CM

## 2022-10-17 DIAGNOSIS — E11.65 UNCONTROLLED TYPE 2 DIABETES MELLITUS WITH HYPERGLYCEMIA, WITHOUT LONG-TERM CURRENT USE OF INSULIN (HCC): Primary | ICD-10-CM

## 2022-10-17 DIAGNOSIS — E66.01 CLASS 3 SEVERE OBESITY DUE TO EXCESS CALORIES WITH SERIOUS COMORBIDITY AND BODY MASS INDEX (BMI) OF 50.0 TO 59.9 IN ADULT (HCC): ICD-10-CM

## 2022-10-17 PROBLEM — R50.9 ACUTE FEBRILE ILLNESS: Status: RESOLVED | Noted: 2021-06-07 | Resolved: 2022-10-17

## 2022-10-17 PROCEDURE — 90471 IMMUNIZATION ADMIN: CPT | Performed by: INTERNAL MEDICINE

## 2022-10-17 PROCEDURE — 99214 OFFICE O/P EST MOD 30 MIN: CPT | Performed by: INTERNAL MEDICINE

## 2022-10-17 PROCEDURE — 3008F BODY MASS INDEX DOCD: CPT | Performed by: INTERNAL MEDICINE

## 2022-10-17 PROCEDURE — 3080F DIAST BP >= 90 MM HG: CPT | Performed by: INTERNAL MEDICINE

## 2022-10-17 PROCEDURE — 90677 PCV20 VACCINE IM: CPT | Performed by: INTERNAL MEDICINE

## 2022-10-17 PROCEDURE — 3077F SYST BP >= 140 MM HG: CPT | Performed by: INTERNAL MEDICINE

## 2022-10-17 RX ORDER — GLIPIZIDE 10 MG/1
10 TABLET ORAL DAILY
Qty: 90 TABLET | Refills: 0 | Status: SHIPPED | OUTPATIENT
Start: 2022-10-17

## 2022-10-17 RX ORDER — GLIPIZIDE 10 MG/1
10 TABLET ORAL DAILY
COMMUNITY
End: 2022-10-17

## 2022-10-17 RX ORDER — INSULIN ASPART 100 [IU]/ML
INJECTION, SOLUTION INTRAVENOUS; SUBCUTANEOUS
Qty: 15 ML | Refills: 0 | Status: SHIPPED | OUTPATIENT
Start: 2022-10-17

## 2022-10-17 RX ORDER — PEN NEEDLE, DIABETIC 31 GX5/16"
1 NEEDLE, DISPOSABLE MISCELLANEOUS
Qty: 300 EACH | Refills: 3 | Status: SHIPPED | OUTPATIENT
Start: 2022-10-17 | End: 2023-10-17

## 2022-10-17 RX ORDER — INSULIN ASPART 100 [IU]/ML
INJECTION, SOLUTION INTRAVENOUS; SUBCUTANEOUS
Qty: 9 ML | Refills: 0 | Status: SHIPPED | OUTPATIENT
Start: 2022-10-17 | End: 2022-10-17

## 2022-10-17 NOTE — TELEPHONE ENCOUNTER
[facility]    New rx needed for Novlog Flexpen    1/ rx should be for 15ml = to one full box, they do not break boxes    2/ rx should include MAX dose per day

## 2022-10-20 ENCOUNTER — TELEPHONE (OUTPATIENT)
Dept: INTERNAL MEDICINE CLINIC | Facility: CLINIC | Age: 57
End: 2022-10-20

## 2022-11-03 DIAGNOSIS — R60.0 LOWER EXTREMITY EDEMA: ICD-10-CM

## 2022-11-04 RX ORDER — FUROSEMIDE 20 MG/1
TABLET ORAL
Qty: 30 TABLET | Refills: 0 | Status: SHIPPED | OUTPATIENT
Start: 2022-11-04

## 2022-11-04 RX ORDER — POTASSIUM CHLORIDE 750 MG/1
TABLET, FILM COATED, EXTENDED RELEASE ORAL
Qty: 30 TABLET | Refills: 0 | Status: SHIPPED | OUTPATIENT
Start: 2022-11-04

## 2022-11-04 NOTE — TELEPHONE ENCOUNTER
LOV: 10/17/2022 with Dr. Hannah Lacey  RTC: 1 month  Last Relevant Labs: 10/10/2022  Filled: 9/7/2022    #30 with 0 refills    No future appointments.

## 2022-12-07 ENCOUNTER — OFFICE VISIT (OUTPATIENT)
Dept: INTERNAL MEDICINE CLINIC | Facility: CLINIC | Age: 57
End: 2022-12-07
Payer: COMMERCIAL

## 2022-12-07 VITALS
OXYGEN SATURATION: 99 % | TEMPERATURE: 98 F | RESPIRATION RATE: 12 BRPM | WEIGHT: 284.81 LBS | DIASTOLIC BLOOD PRESSURE: 84 MMHG | SYSTOLIC BLOOD PRESSURE: 132 MMHG | HEART RATE: 73 BPM | BODY MASS INDEX: 44.7 KG/M2 | HEIGHT: 67 IN

## 2022-12-07 DIAGNOSIS — E66.01 CLASS 3 SEVERE OBESITY DUE TO EXCESS CALORIES WITH SERIOUS COMORBIDITY AND BODY MASS INDEX (BMI) OF 50.0 TO 59.9 IN ADULT (HCC): ICD-10-CM

## 2022-12-07 DIAGNOSIS — E03.9 HYPOTHYROIDISM, UNSPECIFIED TYPE: ICD-10-CM

## 2022-12-07 DIAGNOSIS — E11.65 UNCONTROLLED TYPE 2 DIABETES MELLITUS WITH HYPERGLYCEMIA, WITHOUT LONG-TERM CURRENT USE OF INSULIN (HCC): Primary | ICD-10-CM

## 2022-12-07 PROBLEM — E78.00 HYPERCHOLESTEROLEMIA: Status: RESOLVED | Noted: 2018-02-14 | Resolved: 2022-12-07

## 2022-12-07 PROCEDURE — 3079F DIAST BP 80-89 MM HG: CPT | Performed by: INTERNAL MEDICINE

## 2022-12-07 PROCEDURE — 99214 OFFICE O/P EST MOD 30 MIN: CPT | Performed by: INTERNAL MEDICINE

## 2022-12-07 PROCEDURE — 3075F SYST BP GE 130 - 139MM HG: CPT | Performed by: INTERNAL MEDICINE

## 2022-12-07 PROCEDURE — 3008F BODY MASS INDEX DOCD: CPT | Performed by: INTERNAL MEDICINE

## 2022-12-07 RX ORDER — BLOOD-GLUCOSE,RECEIVER,CONT
EACH MISCELLANEOUS
Qty: 1 EACH | Refills: 0 | Status: SHIPPED | OUTPATIENT
Start: 2022-12-07

## 2022-12-07 RX ORDER — BLOOD-GLUCOSE SENSOR
EACH MISCELLANEOUS
Qty: 3 EACH | Refills: 0 | Status: SHIPPED | OUTPATIENT
Start: 2022-12-07

## 2022-12-07 RX ORDER — INSULIN ASPART 100 [IU]/ML
INJECTION, SOLUTION INTRAVENOUS; SUBCUTANEOUS
Qty: 15 ML | Refills: 0 | Status: SHIPPED | OUTPATIENT
Start: 2022-12-07

## 2022-12-07 RX ORDER — BLOOD-GLUCOSE TRANSMITTER
EACH MISCELLANEOUS
Qty: 1 EACH | Refills: 0 | Status: SHIPPED | OUTPATIENT
Start: 2022-12-07

## 2022-12-07 NOTE — PATIENT INSTRUCTIONS
Increase your detemir to 20 units  Increase your mealtime to 8 units  Start your glipizide    We will check to see if dexcom is approved    I will have the diabetes educator follow up with you    Lets repeat blood tests in January.  See me after the test results

## 2022-12-16 ENCOUNTER — DIABETIC EDUCATION (OUTPATIENT)
Dept: ENDOCRINOLOGY CLINIC | Facility: CLINIC | Age: 57
End: 2022-12-16
Payer: COMMERCIAL

## 2022-12-16 DIAGNOSIS — E11.65 TYPE 2 DIABETES MELLITUS WITH HYPERGLYCEMIA, WITH LONG-TERM CURRENT USE OF INSULIN (HCC): Primary | ICD-10-CM

## 2022-12-16 DIAGNOSIS — Z79.4 TYPE 2 DIABETES MELLITUS WITH HYPERGLYCEMIA, WITH LONG-TERM CURRENT USE OF INSULIN (HCC): Primary | ICD-10-CM

## 2022-12-16 PROCEDURE — 95249 CONT GLUC MNTR PT PROV EQP: CPT | Performed by: DIETITIAN, REGISTERED

## 2022-12-16 NOTE — PROGRESS NOTES
Librado Mims  3/2/1965 was started on Dexcom G6 Continuous Glucose Sensor    12/16/2022    Referring Provider: Dr. Vivek Gama Start time: 12:30 End time: 1:15    Used language line video for . Comments: Patient was instructed to bring reader to all appts   Explained sensor to start recording in 2 hours        1. Test blood glucose if symptoms do not match sensor reading. 2. How to handle problems like MRI, CT scans, travel, etc.   3. Explained how to enter sensor code q 10 days and transmitter ID q 3 months   4. Showed pt how to change high/low alert as well as to see when sensor expires   5. Reviewed how to remove/separate transmitter from sensor in 10 days  6. Reviewed sensor expiration alerts 6 hours, 2 hours, 30 minutes and option to end sensor session early and why patient may want to end sensor before it expires   7. If patient using , showed pt how to check battery life and how to charge the    8. Instructed pt not to inject insulin within 3 inches of sensor   9. Instructed pt they will have to return to QID fingerstick BG testing temporarily if out of sensors/reader/transmitter. 10. Explained it is water-proof - pt swims 4 days/week - she will order overlay patches   12. At airports, can go through metal detector but not full body scanner. 13 Pt provided with [facility] Customer Support phone # [phone]. Sensor Settings:  High Alarm: off   Low Alarm: 80    Reviewed written material provided with product. Patient verbalized understanding and has no further questions at this time. Thank you for the referral.  Patient to follow-up with diabetes educator Silas 20th to do MNT   Patient to contact diabetes center for questions/concerns.   Anshu Bajwa MS, RD, CDCES, LDN

## 2022-12-18 DIAGNOSIS — E78.2 MIXED HYPERLIPIDEMIA: ICD-10-CM

## 2022-12-19 RX ORDER — ATORVASTATIN CALCIUM 10 MG/1
TABLET, FILM COATED ORAL
Qty: 90 TABLET | Refills: 0 | Status: SHIPPED | OUTPATIENT
Start: 2022-12-19

## 2022-12-21 DIAGNOSIS — R60.0 LOWER EXTREMITY EDEMA: ICD-10-CM

## 2022-12-21 RX ORDER — POTASSIUM CHLORIDE 750 MG/1
TABLET, FILM COATED, EXTENDED RELEASE ORAL
Qty: 30 TABLET | Refills: 0 | Status: SHIPPED | OUTPATIENT
Start: 2022-12-21

## 2022-12-21 RX ORDER — FUROSEMIDE 20 MG/1
20 TABLET ORAL DAILY PRN
Qty: 30 TABLET | Refills: 0 | Status: SHIPPED | OUTPATIENT
Start: 2022-12-21

## 2022-12-21 NOTE — TELEPHONE ENCOUNTER
LOV: 12/7/2022 with Dr. Marcia Lagunas  RTC: 6 weeks  Last Relevant Labs: 10/10/2022  Filled: 11/4/2022    #30 with 0 refills    Future Appointments   Date Time Provider Kingsley Thomas   1/20/2023  1:30 PM Gordo Healy, JAE,LDN,CDE [id number] [place]

## 2023-01-10 DIAGNOSIS — E11.65 UNCONTROLLED TYPE 2 DIABETES MELLITUS WITH HYPERGLYCEMIA, WITHOUT LONG-TERM CURRENT USE OF INSULIN (HCC): ICD-10-CM

## 2023-01-10 RX ORDER — BLOOD-GLUCOSE SENSOR
EACH MISCELLANEOUS
Qty: 3 EACH | Refills: 0 | Status: SHIPPED | OUTPATIENT
Start: 2023-01-10

## 2023-01-14 DIAGNOSIS — E11.65 UNCONTROLLED TYPE 2 DIABETES MELLITUS WITH HYPERGLYCEMIA, WITHOUT LONG-TERM CURRENT USE OF INSULIN (HCC): ICD-10-CM

## 2023-01-16 RX ORDER — BLOOD-GLUCOSE TRANSMITTER
EACH MISCELLANEOUS
Qty: 1 EACH | Refills: 0 | Status: SHIPPED | OUTPATIENT
Start: 2023-01-16

## 2023-01-20 ENCOUNTER — TELEPHONE (OUTPATIENT)
Dept: ENDOCRINOLOGY CLINIC | Facility: CLINIC | Age: 58
End: 2023-01-20

## 2023-01-20 NOTE — TELEPHONE ENCOUNTER
Called language line twice yesterday (per pt request) and left messages to remind her of her appt today. Pt did not show today. Tried to call language line but waited 5 minutes and no answer for . Will try calling pt next week to reschedule her.

## 2023-03-28 ENCOUNTER — TELEPHONE (OUTPATIENT)
Dept: INTERNAL MEDICINE CLINIC | Facility: CLINIC | Age: 58
End: 2023-03-28

## 2023-03-28 NOTE — TELEPHONE ENCOUNTER
Dr. [facility] calling to request clearance for 1 tooth extraction. Patient will be going in to their office next week, patient is diabetic. Needs letter from Dr. Alexis Lay stating it is okay to perform extraction and stating any restrictions, if any.       [phone]  fax: [phone]

## 2023-03-28 NOTE — TELEPHONE ENCOUNTER
Needs to have blood tests done first. I see she has an appointment with me so please have her do the blood tests prior to seeing me  [address] [place] [address] DO

## 2023-03-31 ENCOUNTER — LAB ENCOUNTER (OUTPATIENT)
Dept: LAB | Age: 58
End: 2023-03-31
Attending: INTERNAL MEDICINE
Payer: COMMERCIAL

## 2023-03-31 DIAGNOSIS — E11.65 UNCONTROLLED TYPE 2 DIABETES MELLITUS WITH HYPERGLYCEMIA, WITHOUT LONG-TERM CURRENT USE OF INSULIN (HCC): ICD-10-CM

## 2023-03-31 DIAGNOSIS — R03.0 ELEVATED BP WITHOUT DIAGNOSIS OF HYPERTENSION: ICD-10-CM

## 2023-03-31 DIAGNOSIS — E03.9 HYPOTHYROIDISM, UNSPECIFIED TYPE: ICD-10-CM

## 2023-03-31 LAB
ALT SERPL-CCNC: 23 U/L
ANION GAP SERPL CALC-SCNC: 5 MMOL/L (ref 0–18)
AST SERPL-CCNC: 16 U/L (ref 15–37)
BUN BLD-MCNC: 12 MG/DL (ref 7–18)
CALCIUM BLD-MCNC: 9.2 MG/DL (ref 8.5–10.1)
CHLORIDE SERPL-SCNC: 105 MMOL/L (ref 98–112)
CHOLEST SERPL-MCNC: 214 MG/DL (ref ?–200)
CO2 SERPL-SCNC: 27 MMOL/L (ref 21–32)
CREAT BLD-MCNC: 0.54 MG/DL
CREAT UR-SCNC: 175 MG/DL
EST. AVERAGE GLUCOSE BLD GHB EST-MCNC: 335 MG/DL (ref 68–126)
FASTING PATIENT LIPID ANSWER: YES
FASTING STATUS PATIENT QL REPORTED: YES
GFR SERPLBLD BASED ON 1.73 SQ M-ARVRAT: 107 ML/MIN/1.73M2 (ref 60–?)
GLUCOSE BLD-MCNC: 195 MG/DL (ref 70–99)
HBA1C MFR BLD: 13.3 % (ref ?–5.7)
HDLC SERPL-MCNC: 56 MG/DL (ref 40–59)
LDLC SERPL CALC-MCNC: 137 MG/DL (ref ?–100)
MICROALBUMIN UR-MCNC: 1.54 MG/DL
MICROALBUMIN/CREAT 24H UR-RTO: 8.8 UG/MG (ref ?–30)
NONHDLC SERPL-MCNC: 158 MG/DL (ref ?–130)
OSMOLALITY SERPL CALC.SUM OF ELEC: 289 MOSM/KG (ref 275–295)
POTASSIUM SERPL-SCNC: 4.1 MMOL/L (ref 3.5–5.1)
SODIUM SERPL-SCNC: 137 MMOL/L (ref 136–145)
T4 FREE SERPL-MCNC: 1.3 NG/DL (ref 0.8–1.7)
TRIGL SERPL-MCNC: 117 MG/DL (ref 30–149)
TSI SER-ACNC: 4.17 MIU/ML (ref 0.36–3.74)
VLDLC SERPL CALC-MCNC: 21 MG/DL (ref 0–30)

## 2023-03-31 PROCEDURE — 82570 ASSAY OF URINE CREATININE: CPT | Performed by: INTERNAL MEDICINE

## 2023-03-31 PROCEDURE — 82043 UR ALBUMIN QUANTITATIVE: CPT | Performed by: INTERNAL MEDICINE

## 2023-03-31 PROCEDURE — 80061 LIPID PANEL: CPT | Performed by: INTERNAL MEDICINE

## 2023-03-31 PROCEDURE — 84443 ASSAY THYROID STIM HORMONE: CPT | Performed by: INTERNAL MEDICINE

## 2023-03-31 PROCEDURE — 84439 ASSAY OF FREE THYROXINE: CPT | Performed by: INTERNAL MEDICINE

## 2023-03-31 PROCEDURE — 84460 ALANINE AMINO (ALT) (SGPT): CPT | Performed by: INTERNAL MEDICINE

## 2023-03-31 PROCEDURE — 83036 HEMOGLOBIN GLYCOSYLATED A1C: CPT | Performed by: INTERNAL MEDICINE

## 2023-03-31 PROCEDURE — 84450 TRANSFERASE (AST) (SGOT): CPT | Performed by: INTERNAL MEDICINE

## 2023-03-31 PROCEDURE — 80048 BASIC METABOLIC PNL TOTAL CA: CPT | Performed by: INTERNAL MEDICINE

## 2023-04-03 ENCOUNTER — OFFICE VISIT (OUTPATIENT)
Dept: INTERNAL MEDICINE CLINIC | Facility: CLINIC | Age: 58
End: 2023-04-03
Payer: COMMERCIAL

## 2023-04-03 VITALS
SYSTOLIC BLOOD PRESSURE: 134 MMHG | RESPIRATION RATE: 12 BRPM | HEART RATE: 72 BPM | WEIGHT: 274 LBS | DIASTOLIC BLOOD PRESSURE: 71 MMHG | TEMPERATURE: 99 F | HEIGHT: 65.25 IN | BODY MASS INDEX: 45.1 KG/M2 | OXYGEN SATURATION: 98 %

## 2023-04-03 DIAGNOSIS — Z12.31 ENCOUNTER FOR SCREENING MAMMOGRAM FOR MALIGNANT NEOPLASM OF BREAST: ICD-10-CM

## 2023-04-03 DIAGNOSIS — E66.01 CLASS 3 SEVERE OBESITY DUE TO EXCESS CALORIES WITH SERIOUS COMORBIDITY AND BODY MASS INDEX (BMI) OF 50.0 TO 59.9 IN ADULT (HCC): ICD-10-CM

## 2023-04-03 DIAGNOSIS — G47.33 OSA (OBSTRUCTIVE SLEEP APNEA): ICD-10-CM

## 2023-04-03 DIAGNOSIS — E03.9 HYPOTHYROIDISM, UNSPECIFIED TYPE: ICD-10-CM

## 2023-04-03 DIAGNOSIS — E78.2 MIXED HYPERLIPIDEMIA: ICD-10-CM

## 2023-04-03 DIAGNOSIS — B00.1 COLD SORE: ICD-10-CM

## 2023-04-03 DIAGNOSIS — Z00.00 ROUTINE PHYSICAL EXAMINATION: Primary | ICD-10-CM

## 2023-04-03 DIAGNOSIS — K11.9 SALIVARY GLAND DISORDER: ICD-10-CM

## 2023-04-03 DIAGNOSIS — E11.65 UNCONTROLLED TYPE 2 DIABETES MELLITUS WITH HYPERGLYCEMIA, WITHOUT LONG-TERM CURRENT USE OF INSULIN (HCC): ICD-10-CM

## 2023-04-03 DIAGNOSIS — R30.0 DYSURIA: ICD-10-CM

## 2023-04-03 LAB
APPEARANCE: CLEAR
BILIRUBIN: NEGATIVE
GLUCOSE (URINE DIPSTICK): >=1000 MG/DL
KETONES (URINE DIPSTICK): NEGATIVE MG/DL
LEUKOCYTES: NEGATIVE
MULTISTIX LOT#: ABNORMAL NUMERIC
NITRITE, URINE: NEGATIVE
OCCULT BLOOD: NEGATIVE
PH, URINE: 6.5 (ref 4.5–8)
PROTEIN (URINE DIPSTICK): NEGATIVE MG/DL
SPECIFIC GRAVITY: 1.01 (ref 1–1.03)
UROBILINOGEN,SEMI-QN: 0.2 MG/DL (ref 0–1.9)

## 2023-04-03 PROCEDURE — 87086 URINE CULTURE/COLONY COUNT: CPT | Performed by: INTERNAL MEDICINE

## 2023-04-03 RX ORDER — LEVOTHYROXINE SODIUM 88 UG/1
88 TABLET ORAL
Qty: 90 TABLET | Refills: 1 | Status: SHIPPED | OUTPATIENT
Start: 2023-04-03

## 2023-04-03 RX ORDER — INSULIN ASPART 100 [IU]/ML
INJECTION, SOLUTION INTRAVENOUS; SUBCUTANEOUS
Qty: 15 ML | Refills: 0 | Status: SHIPPED | OUTPATIENT
Start: 2023-04-03

## 2023-04-03 RX ORDER — ATORVASTATIN CALCIUM 20 MG/1
20 TABLET, FILM COATED ORAL NIGHTLY
Qty: 90 TABLET | Refills: 1 | Status: SHIPPED | OUTPATIENT
Start: 2023-04-03

## 2023-04-03 RX ORDER — VALACYCLOVIR HYDROCHLORIDE 1 G/1
1000 TABLET, FILM COATED ORAL EVERY 12 HOURS SCHEDULED
Qty: 14 TABLET | Refills: 0 | Status: SHIPPED | OUTPATIENT
Start: 2023-04-03 | End: 2023-04-10

## 2023-04-14 ENCOUNTER — HOSPITAL ENCOUNTER (OUTPATIENT)
Dept: MAMMOGRAPHY | Age: 58
Discharge: HOME | End: 2023-04-14
Attending: INTERNAL MEDICINE
Payer: COMMERCIAL

## 2023-04-14 DIAGNOSIS — Z12.31 ENCOUNTER FOR SCREENING MAMMOGRAM FOR MALIGNANT NEOPLASM OF BREAST: ICD-10-CM

## 2023-04-14 PROCEDURE — 77067 SCR MAMMO BI INCL CAD: CPT | Performed by: INTERNAL MEDICINE

## 2023-04-14 PROCEDURE — 77063 BREAST TOMOSYNTHESIS BI: CPT | Performed by: INTERNAL MEDICINE

## 2023-04-17 NOTE — TELEPHONE ENCOUNTER
LMTCB x 1   Needs to establish care with a PCP. Please attempt to contact pt again. Ty.     Failed protocol     Last refill:  5/29/2020 #180 NR    LOV:   5/29/2020 Dr Denise Ortiz RTC 3 months  1. Uncontrolled DM2 w/ hyperglycemia - Due for updated labs.  Comp
Needs appointment for future refills.  32 [facility]
Tried to leave a voicemail, not available.
code stroke

## 2023-04-17 NOTE — PROGRESS NOTES
Dear RN, please let the patient know   [place] is negative for breast cancer.  We will repeat this yearly  32 [facility] DO

## 2023-05-01 ENCOUNTER — OFFICE VISIT (OUTPATIENT)
Facility: LOCATION | Age: 58
End: 2023-05-01
Payer: COMMERCIAL

## 2023-05-01 DIAGNOSIS — K11.5 SIALOLITHIASIS OF SUBMANDIBULAR GLAND: ICD-10-CM

## 2023-05-01 DIAGNOSIS — K11.23 CHRONIC SIALOADENITIS: Primary | ICD-10-CM

## 2023-05-01 PROCEDURE — 99213 OFFICE O/P EST LOW 20 MIN: CPT | Performed by: OTOLARYNGOLOGY

## 2023-05-06 DIAGNOSIS — R60.0 LOWER EXTREMITY EDEMA: ICD-10-CM

## 2023-05-08 RX ORDER — POTASSIUM CHLORIDE 750 MG/1
TABLET, FILM COATED, EXTENDED RELEASE ORAL
Qty: 30 TABLET | Refills: 0 | OUTPATIENT
Start: 2023-05-08

## 2023-05-11 DIAGNOSIS — K11.23 CHRONIC SIALOADENITIS: Primary | ICD-10-CM

## 2023-06-06 NOTE — PATIENT INSTRUCTIONS
Mounjaro ordered.  Will see if it is covered with your insurance  I have blood tests for you and EKG    Good luck with surgery    See me back after the surgery    Jared Bermudez' preop department will call you with instructions in regards to your medications

## 2023-06-07 NOTE — PROGRESS NOTES
Dear RN, please let the patient know   EKG with new changes noted. Any chest pain or shortness of breath? I would like to get an echocardiogram before her surgery.  Orders are in  [address]

## 2023-06-07 NOTE — PROGRESS NOTES
Dear RN, please let the patient know   1. Diabetes is uncontrolled. Please increase levemir to 22 units and novolog to 12 units. (Can you adjust that on mychart).  Uncontrolled glucose leads to decrease in wound healing, can we notify the surgeon as well as sometimes surgeons do not do surgery when the A1c is this high  Marci Esipnosa DO

## 2023-06-07 NOTE — PROGRESS NOTES
Dear RN, please let the patient know   I would like her to schedule a stress test too before her surgery  Marci Chapman DO

## 2023-06-08 NOTE — TELEPHONE ENCOUNTER
Pts daughter Gordy Ramos called in regards to her mom having a bump behind her left ear. Pts daughter stated that her mom states that it feels like its throbbing without even touching it. No other symptoms mentioned    Pts daughter stated that her mom did not mention this to her and that Dr. Hernando Hernandez was not informed during the pts visit this past week on 6/6 for her Pre-op clearance for Dr. Katie Gandhi for 6/15. I informed pt's daughter that 6/14 would be Dr Indu Harkins next opening to be seen. I also checked other providers for the pt for soonest appt to schedule, but the pt has a stress test scheduled for Monday.      Pts daughter was thinking of taking her mom to ic    Please advise, may need to be triaged

## 2023-06-08 NOTE — TELEPHONE ENCOUNTER
Pts Max Bernal informed me that her mom was already seen at the [facility] ic. Per SV, she does not need the appt for today at 1 anymore. I informed Hill Starkey that her mom does need to have her stress test and echocardiogram done soon for her surgery. Hill Elparadise v/u and stated that her mom has appts for those on Monday.

## 2023-06-08 NOTE — TELEPHONE ENCOUNTER
SV-    Do you want to squeeze her in today/Saturday or should she just be seen at ? Please advise-TY!

## 2023-06-08 NOTE — TELEPHONE ENCOUNTER
Called Saul Kanu, pts daughter to inform her of TE, opening for today. Pt is scheduled.     Future Appointments   Date Time Provider Kingsley Thomas   6/8/2023  1:00 PM Chavez Osorio,  [facility] [place]

## 2023-06-08 NOTE — DISCHARGE INSTRUCTIONS
Alternate ice / heat as tolerated  Drink plenty of water  Tylenol for pain as needed  Zanaflex for spasms as needed (may make your drowsy)  You may benefit from over-the-counter topical pain medication such as: Voltaren, Icy/Hot, Biofreeze, Bengay, etc.  You may benefit from massage  Avoid excessive twisting / bending / lifting  Avoid looking down (e.g. texting) as this worsens strain  Expect symptoms to start improving over next few days

## 2023-06-12 NOTE — PAT NURSING NOTE
Called ,surgeon office nad spoke to Carole berry of need for medical clearance. She stated they anticipated this and  completed medical clearance in EPIC. I advised that in her note she indicated patient needed a stress test and echocardiogram and these results need to be addressed in clearance. Patient had bot tests today and results are pending.  She will let Physician know and will fax when available

## 2023-06-12 NOTE — TELEPHONE ENCOUNTER
Luis Villanueva from pre admission testing called stating due to pts abnormal EKG and echo, the anesthesiologist would like an updated medical clearance. They are requesting SV addends pre op OV note clearing her for surgery with EKG and echo results. Anesthesiologist does not want to put her under anesthesia without clearance.      [phone]

## 2023-06-12 NOTE — TELEPHONE ENCOUNTER
I cannot clear her as I have not received the results yet. I will addend my note once I have received the results.    32 [facility] DO

## 2023-06-13 NOTE — TELEPHONE ENCOUNTER
Any update yet.  As I cannot clear her till the test results are back  [address] [place] [address]

## 2023-06-13 NOTE — TELEPHONE ENCOUNTER
Called [facility], as patient has not seen their doctors they do not have results. Spoke to Arash with [facility] She states the test haven't been read as it was entered in \"routine\". She states turn around time is 3-5 days for routine test. She will try to find a doctor to read it sooner and call back.

## 2023-06-13 NOTE — TELEPHONE ENCOUNTER
Dear RN    Can we call [facility] and get the stress test results for the patient.  I cannot clear patient for surgery until the results are back    [address] [place] [address]

## 2023-06-14 NOTE — TELEPHONE ENCOUNTER
SV's Pre-op exam note from 6/6 with addendum faxed to Dr. Elliot Perkins office (fax#:  [phone]). Confirmation received.

## 2023-06-14 NOTE — PROGRESS NOTES
Dear RN, please let the patient know   Stress test is normal  Please let me know if you have any questions  Marci Herrera DO

## 2023-06-14 NOTE — TELEPHONE ENCOUNTER
Can we fax my note with the addendum. Okay to proceed with surgery from cardiac standpoint.  She needs better sugar control  Marci Jewell DO

## 2023-06-14 NOTE — PROGRESS NOTES
Dear RN, please let the patient know   Echocardiogram (ultrasound of the heart) is normal  Marci Lance Wellington DO

## 2023-06-15 NOTE — BRIEF OP NOTE
Pre-Operative Diagnosis: Chronic sialoadenitis [K11.23]     Post-Operative Diagnosis: Chronic sialoadenitis [K11.23]      Procedure Performed:   Excision Right Submandibular Gland    Surgeon(s) and Role:     * Clover Montalvo MD - Primary     * Anne Rendon MD - Assisting Surgeon    Assistant(s):        Surgical Findings: extensive scarring of the gland     Specimen: right submandibular gland     Estimated Blood Loss: Blood Output: 25 mL (6/15/2023  8:58 AM)      Dictation Number:  done    Malia Castro MD  6/15/2023  9:20 AM

## 2023-06-15 NOTE — ANESTHESIA PROCEDURE NOTES
Airway  Date/Time: 6/15/2023 7:40 AM  Urgency: elective      General Information and Staff    Patient location during procedure: OR  Anesthesiologist: Devonte Piña MD  Performed: anesthesiologist   Performed by: Devonte Piña MD  Authorized by: Devonte Piña MD      Indications and Patient Condition  Indications for airway management: anesthesia  Sedation level: deep  Preoxygenated: yes  Patient position: sniffing  Mask difficulty assessment: 1 - vent by mask    Final Airway Details  Final airway type: endotracheal airway      Successful airway: ETT  Cuffed: yes   Successful intubation technique: direct laryngoscopy  Endotracheal tube insertion site: oral  Blade: Gama  Blade size: #2  ETT size (mm): 7.0    Cormack-Lehane Classification: grade I - full view of glottis  Placement verified by: capnometry   Measured from: lips  ETT to lips (cm): 21  Number of attempts at approach: 1

## 2023-06-16 NOTE — OPERATIVE REPORT
[address]    PATIENT'S NAME: Barbara Garcia   ATTENDING PHYSICIAN: Bryson Farrell M.D. OPERATING PHYSICIAN: Bryson Farrell M.D. PATIENT ACCOUNT#:   [de-identified]    LOCATION:  PACU [address] [place], [place] [address]U 5 [facility] 10  MEDICAL RECORD #:   [id number]       YOB: 1965  ADMISSION DATE:       06/15/2023      OPERATION DATE:  06/15/2023    OPERATIVE REPORT    PREOPERATIVE DIAGNOSIS:  Chronic right sialadenitis submandibular gland. POSTOPERATIVE DIAGNOSIS:  Chronic right sialadenitis submandibular gland. PROCEDURE:  Right submandibular gland excision. ASSISTANT SURGEON:  Nicolette Cobos MD.  Assistant was required to identify the vital structures within the upper neck, including the facial vein, lingual nerve, digastric muscle and facial veins. ANESTHESIA:  General.      ESTIMATED BLOOD LOSS:  25 mL. OPERATIVE TECHNIQUE:  Patient taken to the operating room, placed in supine position. After orotracheal intubation and routine prep and drape, procedure was commenced. Approximately 6 mL of 1% lidocaine with epinephrine 1:100,000 was injected locally and through the skin and subcutaneous tissues approximately 2 cm below the ramus of the mandible. The skin was incised with 15 blade. Dissection proceeded in a blunt manner down to the capsule of the gland. With blunt dissection, this was dissected free from the digastric inferiorly, then working anteriorly, it was likewise  from the surrounding tissues until the mylohyoid muscle was identified more posteriorly. The facial vein was sectioned in multiple areas crossing the gland and secured with combination of 2-0 and 3-0 silk. Deep to the gland, this was bluntly dissected free from the deep musculature. The lingual nerve was identified and preserved and then the [facility] duct was doubly ligated with 2-0 silk.   Once the duct had been freed, the gland moved more posteriorly and was dissected free from the surrounding artery and vein with 3-0 silk ties used to secure. Afterwards, the wound was irrigated with normal saline. No further bleeding was seen. Wound was then closed with interrupted 3-0 Vicryl for deep tissues and subcutaneous tissues and a running 4-0 Prolene for skin. Steri-Strips were then applied. Patient was awoken in the operating room, transferred to the recovery room in stable condition.     Dictated By Wilfredo Mcbride M.D.  d: 06/15/2023 15:00:53  t: 06/15/2023 18:07:31  [facility] [id number]  /

## 2023-06-23 NOTE — ANESTHESIA POSTPROCEDURE EVALUATION
[facility] Patient Status:  Inpatient   Age/Gender 62year old female MRN [id number]   [facility] SURGERY Attending Julissa Chente, [address] Day # 1 PCP Marci Olsen DO       Anesthesia Post-op Note    Incision and Drainage Neck Abscess; Excision Right Submandibular Stone    Procedure Summary     Date: 06/23/23 Room / Location: [facility] MAIN OR 02 / [facility] MAIN OR    Anesthesia Start: 1230 Anesthesia Stop: 1288    Procedure: Incision and Drainage Neck Abscess; Excision Right Submandibular Stone (Right) Diagnosis:       Chronic sialoadenitis      Neck abscess      Submandibular abscess      (Chronic sialoadenitis [K11.23]Neck abscess [L02.11]Submandibular abscess [K12.2])    Surgeons: Pako Juan MD Anesthesiologist: Dany Bailon MD    Anesthesia Type: general ASA Status: 3          Anesthesia Type: general    Vitals Value Taken Time   /78 06/23/23 1616   Temp 97.2 06/23/23 1616   Pulse 73 06/23/23 1616   Resp 16 06/23/23 1616   SpO2 100 06/23/23 1616       Patient Location: PACU    Anesthesia Type: general    Airway Patency: patent    Postop Pain Control: adequate    Mental Status: mildly sedated but able to meaningfully participate in the post-anesthesia evaluation    Nausea/Vomiting: none    Cardiopulmonary/Hydration status: stable euvolemic    Complications: no apparent anesthesia related complications    Postop vital signs: stable    Dental Exam: Unchanged from Preop    Patient to be discharged from PACU when criteria met.

## 2023-06-23 NOTE — PROGRESS NOTES
Pt feels less tender and swollen  Afeb  VSS  Floor of mouth soft with stone  Neck with decreased erythema    Will take to OR for I&D with stone removal  Risks complications an alternatives discussed with pt and will proceed

## 2023-06-23 NOTE — ANESTHESIA PROCEDURE NOTES
Airway  Date/Time: 6/23/2023 3:22 PM  Urgency: elective      General Information and Staff    Patient location during procedure: OR  Anesthesiologist: Landa Severin, MD  Performed: anesthesiologist   Performed by: Landa Severin, MD  Authorized by: Landa Severin, MD      Indications and Patient Condition  Indications for airway management: anesthesia  Sedation level: deep  Preoxygenated: yes  Patient position: sniffing  Mask difficulty assessment: 1 - vent by mask    Final Airway Details  Final airway type: endotracheal airway      Successful airway: ETT  Cuffed: yes   Successful intubation technique: direct laryngoscopy  Endotracheal tube insertion site: oral  Blade: GlideScope  Blade size: #4  ETT size (mm): 8.0    Placement verified by: capnometry   Measured from: lips  ETT to lips (cm): 22  Number of attempts at approach: 1

## 2023-06-23 NOTE — BRIEF OP NOTE
Pre-Operative Diagnosis: Chronic sialoadenitis [K11.23]  Neck abscess [L02.11]  Submandibular abscess [K12.2]     Post-Operative Diagnosis: Chronic sialoadenitis [K11.23]Neck abscess [L02.11]Submandibular abscess [K12.2]      Procedure Performed:   Incision and Drainage Neck Abscess;  Excision Right Submandibular [place]) Rylee Hernandez MD - Primary    Assistant(s):        Surgical Findings: stone in right benedict duct  Abscess drained     Specimen: stone     Estimated Blood Loss: Blood Output: 10 mL (6/23/2023  3:51 PM)      Dictation Number:  Yamil Thompson MD  6/23/2023  4:07 PM

## 2023-06-24 NOTE — OPERATIVE REPORT
[facility]    PATIENT'S NAME: JASMIN Rizzo   ATTENDING PHYSICIAN: Clinton Salazar DO   OPERATING PHYSICIAN: Peterson Quintero M.D. PATIENT ACCOUNT#:   [de-identified]    LOCATION:  [address] [place] [address]  MEDICAL RECORD #:   [id number]       YOB: 1965  ADMISSION DATE:       06/22/2023      OPERATION DATE:  06/23/2023    OPERATIVE REPORT    PREOPERATIVE DIAGNOSIS:  Right neck abscess and right submandibular stone. POSTOPERATIVE DIAGNOSIS:  Right neck abscess and right submandibular stone. PROCEDURE:  Excision, intraoral, of right submandibular stone and removal and incision and drainage of right neck abscess with drainage. ANESTHESIA:  General.    OPERATIVE TECHNIQUE:  Patient brought to the operating room, placed in supine position. After orotracheal intubation and routine prep and drape, procedure was commenced. A Ryder mouth gag was placed, used to retract and open the mouth. Examination found a large stone with inflammatory changes around it in the right submandibular duct. With curved hemostats and gentle manipulation, this was removed in several pieces and was submitted to Pathology for gross examination. The wound was irrigated with normal saline and no appreciable foreign body was appreciated after that. Next, attention turned to the neck. The previous 1/4 inch drain was removed. The wound was opened with curved hemostats. Copious amounts of purulent material and serosanguineous material were removed, irrigated with 500 mL of saline and then 500 mL of antibacterial solution. The wounds then were closed with a 1/4-inch Penrose drain placed, secured with 2-0 silk. The deep tissues were closed with interrupted 3-0 Vicryl and skin was closed with interrupted 4-0 Prolene. The patient then had fluff dressings applied, and the patient was transferred to the recovery room in stable condition. Estimated blood loss 10 mL.     Dictated By Peterson Quintero M.D.  d: 06/23/2023 16:17:02  t: 06/23/2023 22:28:00  Ramos Renee [id number]  RK/

## 2023-06-24 NOTE — PLAN OF CARE
Changed dressing on neck, PRN with fluffs and paper tape. Norco effective. Plan of care discussed with patient. Call light within reach. Problem: RISK FOR INFECTION - ADULT  Goal: Absence of fever/infection during anticipated neutropenic period  Description: INTERVENTIONS  - Monitor WBC  - Administer growth factors as ordered  - Implement neutropenic guidelines  Outcome: Progressing     Problem: SAFETY ADULT - FALL  Goal: Free from fall injury  Description: INTERVENTIONS:  - Assess pt frequently for physical needs  - Identify cognitive and physical deficits and behaviors that affect risk of falls.   - [place] fall precautions as indicated by assessment.  - Educate pt/family on patient safety including physical limitations  - Instruct pt to call for assistance with activity based on assessment  - Modify environment to reduce risk of injury  - Provide assistive devices as appropriate  - Consider OT/PT consult to assist with strengthening/mobility  - Encourage toileting schedule  Outcome: Progressing     Problem: RESPIRATORY - ADULT  Goal: Achieves optimal ventilation and oxygenation  Description: INTERVENTIONS:  - Assess for changes in respiratory status  - Assess for changes in mentation and behavior  - Position to facilitate oxygenation and minimize respiratory effort  - Oxygen supplementation based on oxygen saturation or ABGs  - Provide Smoking Cessation handout, if applicable  - Encourage broncho-pulmonary hygiene including cough, deep breathe, Incentive Spirometry  - Assess the need for suctioning and perform as needed  - Assess and instruct to report SOB or any respiratory difficulty  - Respiratory Therapy support as indicated  - Manage/alleviate anxiety  - Monitor for signs/symptoms of CO2 retention  Outcome: Progressing     Problem: SKIN/TISSUE INTEGRITY - ADULT  Goal: Incision(s), wounds(s) or drain site(s) healing without S/S of infection  Description: INTERVENTIONS:  - Assess and document risk factors for pressure ulcer development  - Assess and document skin integrity  - Assess and document dressing/incision, wound bed, drain sites and surrounding tissue  - Implement wound care per orders  - Initiate isolation precautions as appropriate  - Initiate Pressure Ulcer prevention bundle as indicated  Outcome: Progressing     Problem: Altered Communication/Language Barrier  Goal: Patient/Family is able to understand and participate in their care  Description: Interventions:  - Assess communication ability and preferred communication style  - Implement communication aides and strategies  - Use visual cues when possible  - Listen attentively, be patient, do not interrupt  - Minimize distractions  - Allow time for understanding and response  - Establish method for patient to ask for assistance (call light)  - Provide an  as needed  - Communicate barriers and strategies to overcome with those who interact with patient  Outcome: Progressing

## 2023-06-24 NOTE — PROGRESS NOTES
Pt feeling less pain  Afeb  VSS  Dressing with minimal DC  Drain removed  To FU next Friday at 9am  Clindamycin and peridex at discharge

## 2023-06-24 NOTE — PLAN OF CARE
Problem: RISK FOR INFECTION - ADULT  Goal: Absence of fever/infection during anticipated neutropenic period  Description: INTERVENTIONS  - Monitor WBC  - Administer growth factors as ordered  - Implement neutropenic guidelines  Outcome: Progressing     Problem: SAFETY ADULT - FALL  Goal: Free from fall injury  Description: INTERVENTIONS:  - Assess pt frequently for physical needs  - Identify cognitive and physical deficits and behaviors that affect risk of falls.   - [place] fall precautions as indicated by assessment.  - Educate pt/family on patient safety including physical limitations  - Instruct pt to call for assistance with activity based on assessment  - Modify environment to reduce risk of injury  - Provide assistive devices as appropriate  - Consider OT/PT consult to assist with strengthening/mobility  - Encourage toileting schedule  Outcome: Progressing     Problem: Altered Communication/Language Barrier  Goal: Patient/Family is able to understand and participate in their care  Description: Interventions:  - Assess communication ability and preferred communication style  - Implement communication aides and strategies  - Use visual cues when possible  - Listen attentively, be patient, do not interrupt  - Minimize distractions  - Allow time for understanding and response  - Establish method for patient to ask for assistance (call light)  - Provide an  as needed  - Communicate barriers and strategies to overcome with those who interact with patient  Outcome: Progressing     Problem: RESPIRATORY - ADULT  Goal: Achieves optimal ventilation and oxygenation  Description: INTERVENTIONS:  - Assess for changes in respiratory status  - Assess for changes in mentation and behavior  - Position to facilitate oxygenation and minimize respiratory effort  - Oxygen supplementation based on oxygen saturation or ABGs  - Provide Smoking Cessation handout, if applicable  - Encourage broncho-pulmonary hygiene including cough, deep breathe, Incentive Spirometry  - Assess the need for suctioning and perform as needed  - Assess and instruct to report SOB or any respiratory difficulty  - Respiratory Therapy support as indicated  - Manage/alleviate anxiety  - Monitor for signs/symptoms of CO2 retention  Outcome: Progressing     Problem: SKIN/TISSUE INTEGRITY - ADULT  Goal: Incision(s), wounds(s) or drain site(s) healing without S/S of infection  Description: INTERVENTIONS:  - Assess and document risk factors for pressure ulcer development  - Assess and document skin integrity  - Assess and document dressing/incision, wound bed, drain sites and surrounding tissue  - Implement wound care per orders  - Initiate isolation precautions as appropriate  - Initiate Pressure Ulcer prevention bundle as indicated  Outcome: Progressing

## 2023-06-24 NOTE — DISCHARGE INSTRUCTIONS
Change gauze as needed . Call Dr. Lawton Spurling if pain is worsening with pain medications, foul smelling drainage from site, fevers above 100.5.

## 2023-06-26 NOTE — PROGRESS NOTES
First attempt. (Dc 6/24)    32 [facility],   Internal Medicine   [facility]  [facility]  [phone]  Patient has appointment    Andrés Quinonez MD  jun 30, [facility]  ENT  [address] [phone]  Declined    Confirmed with patient. Closing encounter.

## 2023-06-26 NOTE — TELEPHONE ENCOUNTER
Called daughter of pt in regards to surgery on Friday. Pt is doing well post op, all questions answered. Instructed to call back if any changes arise.      Future Appointments   Date Time Provider Kingsley Thomas   7/3/2023 11:15 AM Lynette Gamez MD [facility]   7/5/2023 10:30 AM Michelle Huynh,  [facility] [place]

## 2023-06-26 NOTE — PAYOR COMM NOTE
Discharge Notification    Patient Name: Ana Paula Zuniga  Payor: [address] [place] [address] #: [id number]  Authorization Number: [id number]  Admit Date/Time: 6/22/2023 4:11 PM  Discharge Date/Time: 6/24/2023 1:37 PM

## 2023-07-10 NOTE — PROGRESS NOTES
Patient is here for recheck after drainage of neck abscess. She has changed her dressing twice a day. She thinks the area is less tender but she does note some tingling on her tongue. She denies any fevers or chills. Physical exam: The dressing was removed. The incision site shows very minimal drainage which was obtained for culture. Palpation of the floor mouth appears soft. There is less erythema and swelling in general.  Patient appears to be slowly getting better. We will check the culture results and pending that may need to change antibiotics. We will base follow-up on the culture results. Patient and her son understand her treatment plan.

## 2023-07-13 NOTE — TELEPHONE ENCOUNTER
Pt called. Pt states 'continues the same.  Would like to know if the culture showed if needs to change antibiotic?    Pt [phone]

## 2023-07-14 NOTE — TELEPHONE ENCOUNTER
Humberto Cunningham, daughter called in (on verbal). Daughter stated she's had diarrhea for 3 days. About 3-4 watery stools daily. She stated she has abdominal cramping, but not in pain. Denies fever. No s/s symptoms of dehydration. She states pt has been on and off antibiotics due to abscess. Daughter asked if she could have results from culture. I informed her I can't give any results from dr. Danie Seymour office, so to call his office. Advised her to stay hydrated, to slowly introduce bland foods into system. If severe pain, or weakness to go to ER. She stated she'll call Dr. Danie Seymour office.      MEGAN

## 2023-07-14 NOTE — TELEPHONE ENCOUNTER
I have ordered a C diff pcr stool study too for the diarrhea given she has been on antibiotics  Marci Herrera DO

## 2023-07-14 NOTE — TELEPHONE ENCOUNTER
Pt called at MD request and advised to continue to take clindamycin. Pt scheduled for an appointment, all questions answered.     Future Appointments   Date Time Provider [place]ley Martha   7/17/2023  9:15 AM Elayne Paul MD [facility]   7/17/2023 11:30 AM Nallely Gauthier DO [facility] [place]

## 2023-07-17 NOTE — TELEPHONE ENCOUNTER
Can you call the patient's daughter Saul Rank and find out what letter she needs for the dentist    [address] [place] [address] DO

## 2023-07-17 NOTE — PROGRESS NOTES
Patient is here for recheck after drainage of abscess. The culture showed mixed anaerobic bacteria. She does note some increased pain. Drainage is about the same. Physical exam: The dressing was removed. Area of drainage appears much less inflamed and appears to have less drainage than in the past.  She will continue warm compresses to the area 3 times a day. Change dressing as needed. She will return in 1 week for recheck. Patient actually appears improved since last week.

## 2023-07-17 NOTE — TELEPHONE ENCOUNTER
Loretta/pt's daughter calling to explain pt needs the letter for care credit for the dentist that did the implants. She states the root was never killed when she got implants, causing further damage and frequent oral/dental infections that are affecting her health. She is having pain all the time when biting down. Care credit will waive her past fees so that she can get this corrected but she needs documentation. Call back number if needed for Saul Rank.    Shaw [facility]: [phone]

## 2023-07-17 NOTE — TELEPHONE ENCOUNTER
I do not think I can write this letter as I am not a dentist and I do not know if the implants were done properly.  Can she get it from her new dentist?  [facility] DO

## 2023-07-17 NOTE — TELEPHONE ENCOUNTER
Patient's daughter calling regarding the bacteria that was present in in abscess as patient doesn't remember, okay to leave detailed vm to daughter.

## 2023-07-18 NOTE — TELEPHONE ENCOUNTER
Specimen was previously dropped off, test is in process and result will be available shortly. No further action needed.

## 2023-07-19 NOTE — PROGRESS NOTES
Dear RN, please let the patient know   C diff test is negative.  Her diarrhea should resolve after she has completed the antibiotics   Marci Ybarra DO

## 2023-07-24 NOTE — PROGRESS NOTES
Macarena Diallo is a 62year old female. Patient presents with:  Post-Op: Neck abscess     HPI:   Patient is here for recheck of neck abscess. She has had slightly less drainage. Denies fevers chills. She does note some tingling in the area. Current Outpatient Medications   Medication Sig Dispense Refill    insulin aspart (NOVOLOG FLEXPEN) 100 Units/mL Subcutaneous Solution Pen-injector Inject 12 units with meals 15 mL 0    insulin detemir (LEVEMIR FLEXTOUCH) 100 UNIT/ML Subcutaneous Solution Pen-injector Inject 22 Units into the skin nightly. 15 mL 0    Tirzepatide (MOUNJARO) 5 MG/0.5ML Subcutaneous Solution Pen-injector Inject 5 mg into the skin once a week. 3 mL 0    furosemide 20 MG Oral Tab Take 1 tablet (20 mg total) by mouth daily as needed. 30 tablet 0    Potassium Chloride ER 10 MEQ Oral Tab CR One tablet take when taking lasix (furosemide) 30 tablet 0    Insulin Pen Needle (BD PEN NEEDLE MINI U/F) 31G X 5 MM Does not apply Misc Inject 1 Pen into the skin in the morning, at noon, and at bedtime. 300 each 11    glipiZIDE 10 MG Oral Tab Take 1 tablet (10 mg total) by mouth daily. 90 tablet 0    Omega-3 Fatty Acids (FISH OIL OR) Take by mouth. atorvastatin 20 MG Oral Tab Take 1 tablet (20 mg total) by mouth nightly. 90 tablet 1    levothyroxine 88 MCG Oral Tab Take 1 tablet (88 mcg total) by mouth before breakfast. 90 tablet 1    Insulin Pen Needle (BD PEN NEEDLE MINI U/F) 31G X 5 MM Does not apply Misc Inject 1 pen into the skin 3 (three) times daily before meals. 300 each 3    metFORMIN HCl 1000 MG Oral Tab Take 1 tablet (1,000 mg total) by mouth 2 (two) times daily with meals. (Patient taking differently: Take 0.5 tablets (500 mg total) by mouth 2 (two) times daily with meals. ) 180 tablet 1    Insulin Pen Needle (BD PEN NEEDLE MINI U/F) 31G X 5 MM Does not apply Misc Inject 1 pen into the skin daily.  90 each 0      Past Medical History:   Diagnosis Date    Back problem     pain Cellulitis and abscess of unspecified digit 2012    COVID-19 2021    hospitalization with covid pneumonia and a PE    COVID-19 2022    cough, fever, chills, not hospitalized    Diabetes ([address]     Disorder of thyroid     DVT (deep venous thrombosis) (HCC)     Dyslipidemia     Esophageal reflux     Gastric polyp     High cholesterol     History of blood clots     Hyperglycemia     Hypothyroid     Iron deficiency anemia     due to menorrhagia    Obesity     PAT (obstructive sleep apnea)     PONV (postoperative nausea and vomiting)     Pulmonary embolism (HCC)     Sleep apnea     Visual impairment     reading glasses    Vitamin D deficiency       Social History:  Social History     Socioeconomic History    Marital status:     Number of children: 4   Occupational History    Occupation: packaging   Tobacco Use    Smoking status: Never    Smokeless tobacco: Never   Vaping Use    Vaping Use: Never used   Substance and Sexual Activity    Alcohol use: No    Drug use: No   Other Topics Concern    Caffeine Concern Yes     Comment: 1 cup of coffee every morning    Stress Concern No    Weight Concern Yes    Special Diet Yes     Comment: low carb    Exercise No    Seat Belt Yes   Social History Narrative    , 4 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    Social Determinants of Health  Financial Resource Strain: Low Risk  (2023)      Financial Resource Strain          Difficulty of Paying Living Expenses: Not very hard          Med Affordability: No  Transportation Needs: No Transportation Needs (2023)      Transportation Needs          Lack of Transportation: No   Past Surgical History:   Procedure Laterality Date          x 5    OTHER SURGICAL HISTORY  2011    Lap band placement    OTHER SURGICAL HISTORY  2016    Lap band removal - Dr. Brianna Abbasi @ [facility] 145 PHLEBECTOMY, VARICOSE VEINS, 1 EXTREMITY; <20 INCISIONS      WISDOM TEETH REMOVED           REVIEW OF SYSTEMS:   GENERAL HEALTH: feels well otherwise  GENERAL : denies fever, chills, sweats, weight loss, weight gain  SKIN: denies any unusual skin lesions or rashes  RESPIRATORY: denies shortness of breath with exertion  NEURO: denies headaches    EXAM:   [facility] 05/17/2021   System Findings Details   Skin Normal Inspection - Normal.   Constitutional Normal Overall appearance - Normal.   Head/Face Normal Facial features - Normal. Eyebrows - Normal. Skull - Normal.   Oral/Oropharynx Normal Lips - Normal, Tonsils - Normal, Tongue - Normal    Nasal Normal External nose - Normal. Nasal septum - Normal, Turbinates - Normal   Neurological Normal Memory - Normal. Cranial nerves - Cranial nerves II through XII grossly intact. Neck Exam Normal Band-Aid was removed. There is less drainage on her dressing. Pressure was applied and only clear fluid was seen. The area was treated with 1/4 cc of 1% lidocaine with epinephrine 100,000 and then the granulation tissue was cauterized with silver nitrate. Afterwards the dressing was reapplied. Psychiatric Normal Orientation - Oriented to time, place, person & situation. Appropriate mood and affect. Lymph Detail Normal Submental. Submandibular. Anterior cervical. Posterior cervical. Supraclavicular. Eyes Normal Conjunctiva - Right: Normal, Left: Normal. Pupil - Right: Normal, Left: Normal.    Ears Normal Inspection - Right: Normal, Left: Normal. Canal - Left: Normal. TM - Right: Normal, Left: Normal.     ASSESSMENT AND PLAN:   Area of swelling appears improved. She will continue changing the dressing as needed. She will be reeval in approximately 1 week for recheck. She may require further cautery at that time. The patient indicates understanding of these issues and agrees to the plan.       Krysta Cortes MD  7/24/2023  3:14 PM

## 2023-07-31 NOTE — PROGRESS NOTES
Patient is here for recheck of a right neck abscess. She does note some tingling and swelling above the site of incision. Also occasional right otalgia. She keeps the area covered when sleeping. Physical exam: Floor mouth seems soft. The superior area of incision appears more prominent than inferior to the site of incision. Small amount of granulated Tatian tissue was cauterized silver nitrate. The wound is appearing better from week to week. She will keep the area dry change Band-Aid. She will return in 2 weeks for recheck. She may continue applying heat. We also explained referred pain with no sign of infection as far as the ear goes.

## 2023-08-07 NOTE — TELEPHONE ENCOUNTER
Protocol PASSED    Requesting:      levothyroxine 88 MCG Oral Tab         Sig: Take 1 tablet (88 mcg total) by mouth before breakfast.    Disp: 90 tablet    Refills: 1    Start: 8/7/2023    Class: Normal    Non-formulary For: Hypothyroidism, unspecified type    Last ordered: 4 months ago by Lida Solis DO     Thyroid Supplements Protocol [id number]/07/2023 12:19 PM   Protocol Details TSH test in past 12 months    TSH value between 0.350 and 5.500 IU/ml    Appointment in past 12 or next 3 months        Filled: 4/3/23 90 tablet 0 1 refill     Protocol: FAILED      Tirzepatide (MOUNJARO) 5 MG/0.5ML Subcutaneous Solution Pen-injector          Sig: Inject 5 mg into the skin once a week. Disp: 3 mL    Refills: 0    Start: 8/7/2023    Class: Normal    Non-formulary For: Type 2 diabetes mellitus without complication, with long-term current use of insulin ([facility])    Last ordered: 1 month ago by Lida Solis DO     Diabetic Medication Protocol [id number]/07/2023 12:19 PM   Protocol Details Last HgBA1C < 7.5    HgBA1C procedure resulted in past 6 months    Microalbumin procedure in past 12 months or taking ACE/ARB    Appointment in past 6 or next 3 months        Filled: 7/5/23 3mL 0 refill     Protocol: NONE      insulin aspart (NOVOLOG FLEXPEN) 100 Units/mL Subcutaneous Solution Pen-injector          Sig: Inject 12 units with meals    Disp: 15 mL    Refills: 0    Start: 8/7/2023    Class: Normal    Non-formulary For: Uncontrolled type 2 diabetes mellitus with hyperglycemia, without long-term current use of insulin ([facility]    To pharmacy: Patient will also need insulin pen needles    Last ordered: 3 weeks ago by Lida Solis DO         insulin detemir (LEVEMIR FLEXTOUCH) 100 UNIT/ML Subcutaneous Solution Pen-injector         Sig: Inject 22 Units into the skin nightly.     Disp: 15 mL    Refills: 0    Start: 8/7/2023    Class: Normal    Non-formulary For: Uncontrolled type 2 diabetes mellitus with hyperglycemia, without long-term current use of insulin ([facility]    Last ordered: 3 weeks ago by Tami Franco, DO        To be filled at:  [id number] [place] [phone], [phone]     Filled: 7/17/23 15mL 0 refill     LOV: 7/17/23  RTC: 1 month or sooner  Recent Labs: 6/6/23    Upcoming OV : NONE

## 2023-08-07 NOTE — TELEPHONE ENCOUNTER
Pharmacy called in stating that medication did not have quantity or refills. Pharmacy states they need a prescription or can take a verbal.     Please advise.      insulin aspart (NOVOLOG FLEXPEN) 100 Units/mL Subcutaneous Solution Pen-injector    [id number] Caroline Kumar -[phone], [address] [place]    Phone: [phone] Fax: [phone]

## 2023-08-14 NOTE — PROGRESS NOTES
Kenisha Purvis is a 62year old female. Patient presents with:  Post-Op    HPI:   Patient is here for recheck of an abscess after removal of submandibular gland. She has had intermittent drainage usually some pain followed by some odorous discharge. Current Outpatient Medications   Medication Sig Dispense Refill    Insulin Pen Needle (BD PEN NEEDLE MINI U/F) 31G X 5 MM Does not apply Misc Inject 1 Pen into the skin in the morning, at noon, in the evening, and at bedtime. 400 each 0    levothyroxine 88 MCG Oral Tab Take 1 tablet (88 mcg total) by mouth before breakfast. 90 tablet 1    Tirzepatide (MOUNJARO) 5 MG/0.5ML Subcutaneous Solution Pen-injector Inject 5 mg into the skin once a week. 3 mL 0    insulin aspart (NOVOLOG FLEXPEN) 100 Units/mL Subcutaneous Solution Pen-injector Inject 12 units with meals 15 mL 0    insulin detemir (LEVEMIR FLEXTOUCH) 100 UNIT/ML Subcutaneous Solution Pen-injector Inject 22 Units into the skin nightly. 15 mL 0    furosemide 20 MG Oral Tab Take 1 tablet (20 mg total) by mouth daily as needed. 30 tablet 0    Potassium Chloride ER 10 MEQ Oral Tab CR One tablet take when taking lasix (furosemide) 30 tablet 0    Insulin Pen Needle (BD PEN NEEDLE MINI U/F) 31G X 5 MM Does not apply Misc Inject 1 Pen into the skin in the morning, at noon, and at bedtime. 300 each 11    glipiZIDE 10 MG Oral Tab Take 1 tablet (10 mg total) by mouth daily. 90 tablet 0    Omega-3 Fatty Acids (FISH OIL OR) Take by mouth. atorvastatin 20 MG Oral Tab Take 1 tablet (20 mg total) by mouth nightly. 90 tablet 1    Insulin Pen Needle (BD PEN NEEDLE MINI U/F) 31G X 5 MM Does not apply Misc Inject 1 pen into the skin 3 (three) times daily before meals. 300 each 3    metFORMIN HCl 1000 MG Oral Tab Take 1 tablet (1,000 mg total) by mouth 2 (two) times daily with meals. (Patient taking differently: Take 0.5 tablets (500 mg total) by mouth 2 (two) times daily with meals. ) 180 tablet 1    Insulin Pen Needle (BD PEN NEEDLE MINI U/F) 31G X 5 MM Does not apply Misc Inject 1 pen into the skin daily. 80 each 0      Past Medical History:   Diagnosis Date    Back problem     pain    Cellulitis and abscess of unspecified digit 2012    COVID-19 2021    hospitalization with covid pneumonia and a PE    COVID-19 2022    cough, fever, chills, not hospitalized    Diabetes ([address]     Disorder of thyroid     DVT (deep venous thrombosis) (HCC)     Dyslipidemia     Esophageal reflux     Gastric polyp     High cholesterol     History of blood clots     Hyperglycemia     Hypothyroid     Iron deficiency anemia     due to menorrhagia    Obesity     PAT (obstructive sleep apnea)     PONV (postoperative nausea and vomiting)     Pulmonary embolism (HCC)     Sleep apnea     Visual impairment     reading glasses    Vitamin D deficiency       Social History:  Social History     Socioeconomic History    Marital status:     Number of children: 4   Occupational History    Occupation: packaging   Tobacco Use    Smoking status: Never    Smokeless tobacco: Never   Vaping Use    Vaping Use: Never used   Substance and Sexual Activity    Alcohol use: No    Drug use: No   Other Topics Concern    Caffeine Concern Yes     Comment: 1 cup of coffee every morning    Stress Concern No    Weight Concern Yes    Special Diet Yes     Comment: low carb    Exercise No    Seat Belt Yes   Social History Narrative    , 4 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    Social Determinants of Health  Financial Resource Strain: Low Risk  (2023)      Financial Resource Strain          Difficulty of Paying Living Expenses: Not very hard          Med Affordability: No  Transportation Needs: No Transportation Needs (2023)      Transportation Needs          Lack of Transportation: No   Past Surgical History:   Procedure Laterality Date          x 5    OTHER SURGICAL HISTORY  2011    Lap band placement    OTHER SURGICAL HISTORY  12/28/2016    Lap band removal - Dr. Alessandra Long @ [facility] 145 PHLEBECTOMY, VARICOSE VEINS, 1 EXTREMITY; <20 INCISIONS      WISDOM TEETH REMOVED           REVIEW OF SYSTEMS:   GENERAL HEALTH: feels well otherwise  GENERAL : denies fever, chills, sweats, weight loss, weight gain  SKIN: denies any unusual skin lesions or rashes  RESPIRATORY: denies shortness of breath with exertion  NEURO: denies headaches    EXAM:   [facility] 05/17/2021   System Findings Details   Skin Normal Inspection - Normal.   Constitutional Normal Overall appearance - Normal.   Head/Face Normal Facial features - Normal. Eyebrows - Normal. Skull - Normal.   Oral/Oropharynx Normal Lips - Normal, Tonsils - Normal, Tongue - Normal    Nasal Normal External nose - Normal. Nasal septum - Normal, Turbinates - Normal   Neurological Normal Memory - Normal. Cranial nerves - Cranial nerves II through XII grossly intact. Neck Exam Normal Patient site appears healed but the middle area shows granulation tissue which was treated with silver nitrate after local anesthesia. Psychiatric Normal Orientation - Oriented to time, place, person & situation. Appropriate mood and affect. Lymph Detail Normal Submental. Submandibular. Anterior cervical. Posterior cervical. Supraclavicular. Eyes Normal Conjunctiva - Right: Normal, Left: Normal. Pupil - Right: Normal, Left: Normal.    Ears Normal Inspection - Right: Normal, Left: Normal. Canal - Left: Normal. TM - Right: Normal, Left: Normal.     ASSESSMENT AND PLAN:   1. Neck abscess  As she has had persistent drainage we will repeat CT scan to see if there is a pocket of infection. Further recommendations to follow. - CT SOFT TISSUE OF NECK(CONTRAST ONLY) (CPT=70491); Future    2. Sialolithiasis of submandibular gland  We will make sure there is no other fragments remaining.  - CT SOFT TISSUE OF NECK(CONTRAST ONLY) (CPT=70491);  Future  Further recommendations to follow after the CT scan. The patient indicates understanding of these issues and agrees to the plan.       Shane Abbasi MD  8/14/2023  12:58 PM

## 2023-08-17 NOTE — TELEPHONE ENCOUNTER
Pt daughter called stating they are scheduled to have CT of the neck done however she is allergic of contrast so pt would need order without contrast or allergy medication in order to have CT done.  She would like a call back when order is placed

## 2023-09-01 NOTE — ED QUICK NOTES
Orders for admission, patient is aware of plan and ready to go upstairs. Any questions, please call ED BERENICE Enriquez at extension [phone]. Patient Covid vaccination status: Unvaccinated     COVID Test Ordered in ED: None    COVID Suspicion at Admission: N/A    Running Infusions:  pt with flagyl infusing and saline only up for piggy back.  Please hang rocephin upon completion of flagyl    Mental Status/LOC at time of transport: alert and oriented x 3    Other pertinent information:   CIWA score: N/A   NIH score:  N/A

## 2023-09-01 NOTE — ED QUICK NOTES
Per ;physician with speaking with pt, pt reports itching only with taking penicillin approximately 15 years prior. Pt denies any difficulty breathing. Pt resting comfortably on cart. Pt awake and alert,skin w/d,resps reg/unlabored. Pt given additional cup of ice water and crackers while awaiting bed to be cleaned.

## 2023-09-01 NOTE — PROGRESS NOTES
ED Antibiotic Dose Adjustment by Pharmacy    ceftriaxone (ROCEPHIN) 1 g x1 dose has been ordered. Pharmacy has adjusted the dose to ceftriaxone (ROCEPHIN) 2 g x1 per hospital protocol.     Juanita Apple, PharmD  Clinical Pharmacist Specialist- Emergency Medicine  [facility]  [phone]

## 2023-09-01 NOTE — PLAN OF CARE
Problem: Diabetes/Glucose Control  Goal: Glucose maintained within prescribed range  Description: INTERVENTIONS:  - Monitor Blood Glucose as ordered  - Assess for signs and symptoms of hyperglycemia and hypoglycemia  - Administer ordered medications to maintain glucose within target range  - Assess barriers to adequate nutritional intake and initiate nutrition consult as needed  - Instruct patient on self management of diabetes  9/1/2023 1704 by Archana Issa RN  Outcome: Progressing  9/1/2023 1704 by Archana Issa RN  Outcome: Progressing     Problem: Patient/Family Goals  Goal: Patient/Family Long Term Goal  Description: Patient's Long Term Goal: Discharge to home    Interventions:  - Pain management, consult with hospitalist  - See additional Care Plan goals for specific interventions  9/1/2023 1704 by Archana Issa RN  Outcome: Progressing  9/1/2023 1704 by Archana Issa RN  Outcome: Progressing  Goal: Patient/Family Short Term Goal  Description: Patient's Short Term Goal:     Interventions:   -   - See additional Care Plan goals for specific interventions  9/1/2023 1704 by Archana Issa RN  Outcome: Progressing  9/1/2023 1704 by Archana Issa RN  Outcome: Progressing     Problem: Integumentary status not within defined limits  Goal: Pt's integumentary status will be adequate for discharge  9/1/2023 1704 by Archana Issa RN  Outcome: Progressing  9/1/2023 1704 by Archana Issa RN  Outcome: Progressing     Problem: DISCHARGE PLANNING  Goal: Discharge to home or other facility with appropriate resources  Description: INTERVENTIONS:  - Identify barriers to discharge w/pt and caregiver  - Include patient/family/discharge partner in discharge planning  - Arrange for needed discharge resources and transportation as appropriate  - Identify discharge learning needs (meds, wound care, etc)  - Arrange for interpreters to assist at discharge as needed  - Consider post-discharge preferences of patient/family/discharge partner  - Complete POLST form as appropriate  - Assess patient's ability to be responsible for managing their own health  - Refer to Case Management Department for coordinating discharge planning if the patient needs post-hospital services based on physician/LIP order or complex needs related to functional status, cognitive ability or social support system  9/1/2023 1704 by Mario De La Torre RN  Outcome: Progressing  9/1/2023 1704 by Mario De La Torre RN  Outcome: Progressing

## 2023-09-01 NOTE — ED INITIAL ASSESSMENT (HPI)
Pt to ED c/o neck abscess that is not getting better, pt had sx 3 months ago, pt states she already had 2 rounds of abx but the pain is still there and notices pus draining. Reports can't swallow. Pain 8/10. Filipino speaking.

## 2023-09-01 NOTE — PROGRESS NOTES
NURSING ADMISSION NOTE      Patient admitted via Cart  Oriented to room. Safety precautions initiated. Bed in low position. Call light in reach. Patient is alert and oriented x4. IVF running per order, see MAR. Vitals stable.

## 2023-09-02 NOTE — PROGRESS NOTES
A&Ox4. VSS. RA. . Telemetry: NSR  GI: Abdomen soft, nondistended. : Voids. Pain controlled with PRN pain medications  Up ad cassandra. Incisions:neck wound red, TATIANA, not draining  Diet:1800  IVF running per order. All appropriate safety measures in place. All questions and concerns addressed.  Will continue to monitor

## 2023-09-02 NOTE — PROGRESS NOTES
Patient A&Ox4; Scottish speaking. Vital signs stable on room air. Pain 3/10; motrin given. asleep upon reassessment. Neck incision swollen, clean, dry, intact, and open to air with no drainage. Patient complains of pain when swallowing. Regular carbohydrate controlled diet being tolerated well. Patient and daughter updated on plan of care. Questions and concerns discussed.

## 2023-09-08 NOTE — PROGRESS NOTES
Hospital follow up, first attempt. Kalen Schreiber D.O. Internal Medicine  [address]  [phone]  Thursday 9/14 @12    Patient already has an appointment. Closing encounter.

## 2023-09-12 NOTE — DISCHARGE SUMMARY
[facility] HOSPITALIST  DISCHARGE SUMMARY     Sanjay James Patient Status:  Inpatient    3/2/1965 MRN [id number]   [facility] 3NW-A Attending No att. providers found   Hosp Day # 1 PCP Marci Mathis DO     Date of Admission: 2023  Date of Discharge:  2023     Discharge Disposition: Home or Self Care    Discharge Diagnosis: #h/o right sided submandibular stone and abscess that were retrived and drained - ongoing drainage  Pt missed her recent follow up with ENT as OP  ID, ENT on Cs  #HLD  #DM type 2  Resume home meds    History of Present Illness:   Sanjay James is a 62year old female with h/o DM type 2, HLD, h/o submandibular stone and  abscess that were removed/ drained 23 via ENT. Pt was followed up as OP with ENT had been on clindmaycin as OP. She returns today with ongoing drainge from the wound. She denies any fever, chills. Brief Synopsis:   The patient was admitted, ID and ENT were consulted. She received IV Abx: flagyl , ceftriaxone with good clinical response. She was subsequently DC on oral Abx: cefadroxil/metronidazole with OP follow up with ENT. No surgical intervention performed during this hospital stay. Lace+ Score: 57  59-90 High Risk  29-58 Medium Risk  0-28   Low Risk       TCM Follow-Up Recommendation:  LACE > 58: High Risk of readmission after discharge from the hospital.      Procedures during hospitalization:   CT     Incidental or significant findings and recommendations (brief descriptions):  no    Lab/Test results pending at Discharge:   no    Consultants:  ENT, ID    Discharge Medication List:     Discharge Medications        START taking these medications        Instructions Prescription details   cefadroxil 500 MG Caps  Commonly known as: DURICEF      Take 1 capsule (500 mg total) by mouth every 12 (twelve) hours for 10 days.    Stop taking on: 2023  Quantity: 20 capsule  Refills: 0 metRONIDAZOLE 500 MG Tabs  Commonly known as: Flagyl      Take 1 tablet (500 mg total) by mouth every 8 (eight) hours for 10 days. Stop taking on: September 12, 2023  Quantity: 30 tablet  Refills: 0     traMADol 50 MG Tabs  Commonly known as: Ultram  Notes to patient: As needed      Take 1 tablet (50 mg total) by mouth every 8 (eight) hours as needed. Quantity: 12 tablet  Refills: 0            CONTINUE taking these medications        Instructions Prescription details   atorvastatin 20 MG Tabs  Commonly known as: Lipitor      Take 1 tablet (20 mg total) by mouth nightly. Quantity: 90 tablet  Refills: 1     BD Pen Needle Mini U/F 31G X 5 MM Misc  Generic drug: Insulin Pen Needle      Inject 1 pen into the skin daily. Quantity: 90 each  Refills: 0     BD Pen Needle Mini U/F 31G X 5 MM Misc  Generic drug: Insulin Pen Needle      Inject 1 pen into the skin 3 (three) times daily before meals. Quantity: 300 each  Refills: 3     BD Pen Needle Mini U/F 31G X 5 MM Misc  Generic drug: Insulin Pen Needle      Inject 1 Pen into the skin in the morning, at noon, and at bedtime. Quantity: 300 each  Refills: 11     BD Pen Needle Mini U/F 31G X 5 MM Misc  Generic drug: Insulin Pen Needle      Inject 1 Pen into the skin in the morning, at noon, in the evening, and at bedtime. Quantity: 400 each  Refills: 0     FISH OIL OR      Take by mouth. Refills: 0     glipiZIDE 10 MG Tabs  Commonly known as: Glucotrol      Take 1 tablet (10 mg total) by mouth daily. Quantity: 90 tablet  Refills: 0     Levemir FlexTouch 100 UNIT/ML Sopn  Generic drug: insulin detemir      Inject 22 Units into the skin nightly. Quantity: 15 mL  Refills: 0     levothyroxine 88 MCG Tabs  Commonly known as: Synthroid      Take 1 tablet (88 mcg total) by mouth before breakfast.   Quantity: 90 tablet  Refills: 1     metFORMIN 500 MG Tabs  Commonly known as: Glucophage      Take 1 tablet (500 mg total) by mouth 2 (two) times daily with meals. Refills: 0     Mounjaro 5 MG/0.5ML Sopn  Generic drug: Tirzepatide  Notes to patient: As directed      Inject 5 mg into the skin once a week. Quantity: 3 mL  Refills: 0     NovoLOG FlexPen 100 Units/mL Sopn  Generic drug: insulin aspart      Inject 12 units with meals   Quantity: 15 mL  Refills: 0            ASK your doctor about these medications        Instructions Prescription details   fluconazole 150 MG Tabs  Commonly known as: Diflucan  Ask about: Should I take this medication? Take 1 tablet (150 mg total) by mouth once as needed (for yeast infection). Stop taking on: September 2, 2023  Quantity: 1 tablet  Refills: 0               Where to Get Your Medications        These medications were sent to [id number] - Judahej Ramirez, [address] [phone], [address] [place] [address], [address] [place] [address]      Phone: [phone]   cefadroxil 500 MG Caps  fluconazole 150 MG Tabs  metRONIDAZOLE 500 MG Tabs  traMADol 50 MG Tabs         [place]: [place]    Follow-up appointment:   Greg Lane  [address]  [phone]    Follow up  As needed    Appointments for Next 30 Days 9/12/2023 - 10/12/2023        Date Arrival Time Visit Type Length Department Provider     9/14/2023 12:00 PM  [address] [place] [address] 30 min [address] [facility] [address] [place], [place], [place]    Patient Instructions:         Location Instructions:     Masks are optional for all patients and visitors, unless otherwise indicated. 9/15/2023  9:00 AM  [facility] NEW PATIENT OS [3285] 60 min [facility] , DO    Patient Instructions:     Please arrive 15 minutes prior to your scheduled appointment. Please also bring your Insurance card, Photo ID, and your medication bottles or a list of your current medication.     If your condition improves and this appointment is no longer needed, please contact your physician office to cancel. Please verify with your primary care provider if your insurance requires a referral.        Location Instructions:     Masks are optional for all patients and visitors, unless otherwise indicated. Vital signs:       Physical Exam:    General: No acute distress   Lungs: clear to auscultation  Cardiovascular: S1, S2  Abdomen: Soft      -----------------------------------------------------------------------------------------------  PATIENT DISCHARGE INSTRUCTIONS: See electronic chart    Bari Evans MD    Total time spent on discharge plannin minutes     The [facility] makes medical notes like these available to patients in the interest of transparency. Please be advised this is a medical document. Medical documents are intended to carry relevant information, facts as evident, and the clinical opinion of the practitioner. The medical note is intended as peer to peer communication and may appear blunt or direct. It is written in medical language and may contain abbreviations or verbiage that are unfamiliar.

## 2023-10-04 NOTE — PATIENT INSTRUCTIONS
You don't need any diabetes blood tests right now. For now, we'll continue the same medications - Levemir 20 units at night, Novolog 10 units BEFORE you eat. Please inject your Novolog 5-10 minutes before you start eating so that it can control your sugar better. Please try to take 1000 mg of metformin and 500 mg at night. If your numbness gets worse, go back to 500mg twice a day. Continue your Mounjaro every week. For now we'll keep your glipizide, but based on your sugars next time we may change this. Please check your blood sugar twice a day but at different times every day to give me a variety of information and please make sure to bring your glucometer with you. We'll have you make an appointment with our diabetes educator Sudeep Bryant to discuss how to assess carbohydrates and make sure you are timing your insulin correctly. Please take your levothyroxine (thyroid medication) on an EMPTY stomach with water, 30-60 minutes before you eat anything or take any other medications. Please have your thyroid blood tests done before your next appointment.     Return Visit   [X] Physician in 3 months     [  ] After visit summary   [  ] Fasting/8AM labs  [  ] Central scheduling # for ultrasound/nuclear med/CT/MRI/DXA  [  ] Directions to 1st floor lab to collect urine collection jug/salivary cortisol tubes  [  ] Med rep info for:  [  ] Dental clearance form  [  ] Will need authorization for outside records  [  ] Give blood sugar log  [  ] Other:

## 2023-10-06 NOTE — TELEPHONE ENCOUNTER
Placed call to patient to schedule DM Education: Nutrition and Lifestyle counseling and review of insulin.   Patient scheduled for 10/14 at [address] [place] [address]

## 2023-10-12 NOTE — TELEPHONE ENCOUNTER
Placed order for the Dexcom G7 to go to [facility], pended for provider review.     Fuad Alvarenga RN

## 2023-10-12 NOTE — PROGRESS NOTES
T2DM Nutrition and Lifestyle Counseling    Start Time: 12:58 pm  End Time:2:03 pm    Indication:  Seen by Hillary for T2DM, dx within last 10 years. Referred to DM Educator for Lifestyle and Nutrition counseling with CGM discussion. Patient Concerns:   - Trying to decrease insulin use  - Try to get off of more medications  - Getting on top of diet     Recent A1C:7.8%    Current Diabetes Medications:  Levemir 20 units once daily  Novolog 10 units 3x daily  Metformin 1000 mg AM, 500 mg PM  Glipizide 10 mg daily  Mounjaro 5 mg weekly    Blood Sugar Intake  Checking 2x times daily    Today's Data:  Fastin  2Hr Dinner last night: 160    Nutrition Intake:    TYPICAL Food Notes   Breakfast  Coffee w/ almond milk, eggs with veggies and ham, 1 slice of bread    Lunch Salad and protein  4pm    Dinner 1 cup with chicken, sometimes veggies   Does not always have dinner meal - about once a week   Sacks fruit [employer] and tea       Doesn't really eat out - most would be once a week    Physical Activity  6-7 times a week for 90 minutes  Activities:  Walking , Before surgery Water Aerobics     Nutrition Topics Discussed   - Discussed basic meal planning guidelines for diabetes regular mealtime, limited concentrated sweets.  Worked on establishing eating pattern/timing of meals and snacks    - Discussed in depth meal planning using the healthy eating with diabetes plate method with focus on balanced macronutrient consumption, including identifying foods that are carbohydrates, lean protein, non-starchy vegetables, and heart healthy fats   - Stressed importance of carbohydrate consistency in each meal   - Recommended carbohydrate targets of 45-60 grams at meals and 15-30 grams at snacks   - Educated on label reading   - Educated on portion control; including use of measuring cups, spoons, or food scale   - Provided suggestions for lower carb, healthy snacks   - Discussed how to handle special occasions, dining out, and eating on the go   - Discussed menu planning, healthy food shopping, cooking tips, and need to pre prepare foods    - Educated on emotional eating and being mindful at meals   - Emphasized the need for small, sustainable changes and working on [employer] goals to encourage sustainable behaviors    - Instructions on how to use helpful websites or nutrition/tracking apps reviewed    - If applicable, taught to use carbohydrate to insulin ratio and insulin sensitivity factor    - Discussed barriers and overcoming barriers to best achieve meal planning goals     Dexcom G7 Start    Patient seen in clinic to start on new Dexcom device. Patient given verbal instructions and demonstrated and completed teach back in the correct order before placing first sensor on back of left arm. Reviewed the following topics:    Functions of the sensor, phone/reader, and overlap patches  Test blood glucose if symptoms do not match sensor reading and calibrate system  How to handle problems like MRI, CT scans, travel, etc.   Explained how to change enter sensor code q 10 days  Showed pt how to change high/low alert as well as to see when sensor expires. Reviewed how to remove sensor in 10 days. Reviewed sensor expiration alerts. Instructed pt not to inject insulin within 3 inches of sensor  Instructed pt they will have to return to fingerstick BG testing temporarily if out of sensors/reader  Reviewed packing/supplies    [place]:  - Showed pt how to charge reader  - Use of buttons and how to change alerts     Recommendations:  - Start using Dexcom G7  - Keep food log     Follow Up:   With Herminio Mcgarry on 10/23 for BG Review     Herminio Mcgarry RN  Diabetes Educator

## 2023-10-23 NOTE — PROGRESS NOTES
Continuous Glucose Monitor Download - Dexcom G7    Start Time: 1:00 pm  End Time: 1:20 pm    Indication:  Follow up with Educator after initial nutrition and lifestyle appointment. Denies need for  for this appointment. Current Diabetes Medication:  Levemir 20 units once daily (night before bed)   Novolog 10 units 3x daily  Metformin 1000 mg AM, 500 mg PM  Glipizide 10 mg daily  Mounjaro 5 mg weekly    Reviewed CGMS download and patient logs:  Days of sensor use: 10  Average glucose (mg/dL): 144  Estimated GMI:6.8%  Coefficient of Deviation = 37% % (goal <35%)  Target Ranges:  mg/dL          78% of time in range  1% of time below range  21% of time above range        Interpretation:  BG's are higher mostly after dinner time meal, with dip into lower or low BG's around 3am.  Patient waking up lower than normal.      Patient Concerns:  - Overall very happy with the BG's right now, going lower   - Do I take insulin when numbers are lower? Patient Self Made Adjustments:  - reduced portions  - Starting Iván Truong soon! Recommended medication changes/Plan:  - Reduce Levemir to 18 u at bedtime  - Potentially scale insulin for meals to account for lower BG's. Follow up:   - With Gerardo Harkins on 11/1 via phone call    Routed to provider for review.     Gerardo Harkins RN  Diabetes Educator

## 2023-10-30 NOTE — TELEPHONE ENCOUNTER
Protocol failed     Tirzepatide [facility]) 5 MG/0.5ML Subcutaneous Solution Pen-injector     LOV: 9/14/23   RTC: 4 weeks   Filled: 9/14/23 # 3ml 0 refills   Labs: 9/14/23   Future Appointments   Date Time Provider Kingsley Thomas   11/1/2023 11:00 AM [facility] DIABETIC EDUCATOR ENDO EM[facility]O [facility] [place]   1/4/2024 12:30 PM Abbey Mayorga DO [id number] [facility] [place]

## 2023-10-30 NOTE — TELEPHONE ENCOUNTER
Katarzyna Starkey with [facility]. called stating to the prescription that was sent for  Tirzepatide [facility]) 5 MG/0.5ML Subcutaneous Solution Pen-injector   Does not match the dosage for manufacturers box quantity. Each box has 2 ML. Script is          Medication Quantity Refills Start End   Tirzepatide [facility]) 5 MG/0.5ML Subcutaneous Solution Pen-injector 3 mL 0 10/30/2023      Quantity needs to be changed or new prescription needs to be sent.      Please advise    [facility] [place], [address] [phone], [phone]

## 2023-11-01 NOTE — PROGRESS NOTES
Continuous Glucose Monitor Review - Dexcom G7    Start Time: 11:08 am   End Time: 11:18 am    Indication:  Patient started new insulin regimen after DM Education appt last week     Current Diabetes Medication:  Levemir 20 units once daily (night before bed)   Novolog 10 units 3x daily  Metformin 1000 mg AM, 500 mg PM --> Patient self stopped medication on 10/24 because she is working out more now  Glipizide 10 mg daily  Mounjaro 5 mg weekly    Reviewed CGMS download and patient logs:  Patient was unable to navigate reader from home to get BG logs, but has been off the sensor for the last 2 days since it came off during a swim exercise class. Patient will call [employer] to replace the sensor. Fasting BG on 11/1 was 118 mg/dL    Recommended medication changes/Plan:  - continue with lifestyle modifications and reach out to office before follow up if needed    Follow up:    With Dr. Maryellen Carrillo on 1/4/24      Dez Fowler RN  Diabetes Educator

## 2023-12-07 NOTE — TELEPHONE ENCOUNTER
LOV: 10/4/2023 -- FOV: 1/4/2024    Last A1c value was 7.6% done 9/14/2023.      CGM order pended for review

## 2023-12-26 NOTE — TELEPHONE ENCOUNTER
Tirzepatide [facility]) 5 MG/0.5ML Subcutaneous Solution Pen-injector          Sig: Inject 5 mg into the skin once a week.     Disp: 2 mL    Refills: 0    Start: 12/22/2023    Class: Normal    Non-formulary For: Type 2 diabetes mellitus without complication, with long-term current use of insulin ([address]    Last ordered: 1 month ago (10/31/2023) by Davidson Manjarrez DO    Diabetic Medication Protocol Opsfar8712/22/2023 01:55 PM   Protocol Details Last HgBA1C < 7.5    HgBA1C procedure resulted in past 6 months    Microalbumin procedure in past 12 months or taking ACE/ARB    Appointment in past 6 or next 3 months      LOV 9/14/23  RTC 4 weeks  Filled 10/31/23 2mL 0 refills   Last A1c 9/14/23  Future Appointments   Date Time Provider Kingsley Thomas   1/4/2024 12:30 PM Candi Mayorga DO [id number] TRACIE [place]

## 2024-01-04 NOTE — PROGRESS NOTES
[facility] Visit  1/4/24      CHIEF COMPLAINT:    Chief Complaint   Patient presents with    Diabetes     Pt here for 3 month fu, labs completed, Last A1c was done on 09/14/2023- 7.6  A1c was done today with  8.0     Interval History:  -Overall doing well, having almost no hypoglycemia - Dexcom occasionally alarms her overnight for sugars ~65 but she states she corrects (with a snack) without confirming a fingerstick as her glucometer battery is out  -Established care with DM educator Pinky in October 2023 and started on Dexcom CGM  -Self-discontinued metformin a month ago as she felt sugars were better  -Continuing to take Novolog 10 units fixed 10-15 minutes before all meals  -Taking Levemir 20 units nightly (Levemir is being phased out of production in 2024)  -Occasional issues with sensor being inserted correctly. At today's visit, Dexcom came off patient's arm and probe not visible on underside - on inspection, probe was kinked up within the sensor and does not appear it ever entered the patient's skin. Advised to call Dexcom for replacements.   -Reports she has not been exercising as her car broke down and she is not able to get to the gym    HISTORY OF PRESENT ILLNESS:  Mel Henry is a 58 year old female with history of hyperlipidemia, obesity, type 2 diabetes, varicose veins, obstructive sleep apnea, hypothyroidism, history of DVT who presented initially 10/4/23 to establish care for diabetes and thyroid. The patient has had variable control of diabetes over the past several years; highest A1c was 13.8% in April 2021 and most recent A1c was 7.8% in September 2023. She reports starting insulin in 2022 and reports that her glucose control has been much better since then. Started Mounjaro in 2023. Started Dexcom CGM in late 2023 after establishing with DM educator.     With regard to hypothyroidism, the patient reports that she was diagnosed with  hypothyroidism over ~2010 but does not know what the etiology is. She states that she has been taking levothyroxine for over a decade at variable doses. Reports that her primary care physician increased her levothyroxine dose to 88 mcg in 2023.       Diabetes mellitus - Type 2  -Diagnosis - ~2016  -Family history - reports that she is not familiar with most of her family history as much of her family is still in [place], but she knows that her brother has prediabetes  -Current medications - Levemir 20 units, Novolog 10 units with every meal, glipizide 10mg, Mounjaro 5mg weekly. Discontinued metformin on her own in December 2023.   -Previous medications - N/A  -History of UTI or yeast infection - had 1 urine infection several years ago  -History of pancreatitis - no  -History of thyroid CA - no  -Blood sugar log - Dexcom G7 as below  -Hypoglycemia - Rarely, reports CGM sometimes alarms her at night that sugars are low and she corrects without confirming fingerstick  -Hypoglycemia awareness - yes  -Hypoglycemia treatment - chocolate candy  -Diet - bread with breakfast, chips and guac for lunch, reports drinking multiple green smoothies, denies juice/soda  -Exercise - Not currently exercising as she reports her car has broken down and she can't get to the pool/gym  -Previous diabetes education - Yes,   -Microvascular complications      -No retinopathy.  Last ophthalmology visit >1 year ago. Due for follow-up.      -No nephropathy.        -Has neuropathy in her feet.      -No gastroparesis.   -Macrovascular complications      -No history of CAD.  The patient had an echocardiogram and stress test performed in June 2023 prior to surgery for an abscess.  Cardiac work-up at that time was normal without any systolic or diastolic dysfunction.      -No history of CVA      -History of varicose veins  -Comorbidities      -No hypertension, patient does not take any antihypertensives      -HLD on atorvastatin 20 mg nightly        Immunization History   Administered Date(s) Administered    Pneumococcal Conjugate PCV20 10/17/2022    TD 05/11/2007    Zoster Vaccine Recombinant Adjuvanted (Shingrix) 04/03/2023   Deferred Date(s) Deferred    Influenza Vaccine Refused 10/17/2022         CURRENT MEDICATIONS:      Current Outpatient Medications:     Continuous Blood Gluc Sensor (DEXCOM G7 SENSOR) Does not apply Misc, 1 each Every 10 days., Disp: 9 each, Rfl: 0    insulin glargine (BASAGLAR KWIKPEN) 100 UNIT/ML Subcutaneous Solution Pen-injector, Inject 24 Units into the skin nightly., Disp: 21.6 mL, Rfl: 0    metFORMIN 500 MG Oral Tab, Take 1 tablet (500 mg total) by mouth daily with breakfast., Disp: 90 tablet, Rfl: 1    Tirzepatide (MOUNJARO) 5 MG/0.5ML Subcutaneous Solution Pen-injector, Inject 5 mg into the skin once a week., Disp: 2 mL, Rfl: 0    ergocalciferol 1.25 MG (23836 UT) Oral Cap, Take 1 capsule (50,000 Units total) by mouth once a week for 12 doses., Disp: 12 capsule, Rfl: 0    glipiZIDE 10 MG Oral Tab, Take 1 tablet (10 mg total) by mouth daily., Disp: 90 tablet, Rfl: 0    atorvastatin 20 MG Oral Tab, Take 1 tablet (20 mg total) by mouth nightly., Disp: 90 tablet, Rfl: 1    levothyroxine 88 MCG Oral Tab, Take 1 tablet (88 mcg total) by mouth before breakfast., Disp: 90 tablet, Rfl: 1    insulin aspart (NOVOLOG FLEXPEN) 100 Units/mL Subcutaneous Solution Pen-injector, Inject 12 units with meals, Disp: 15 mL, Rfl: 0    Insulin Pen Needle (BD PEN NEEDLE MINI U/F) 31G X 5 MM Does not apply Misc, Inject 1 Pen into the skin in the morning, at noon, in the evening, and at bedtime., Disp: 400 each, Rfl: 0    Insulin Pen Needle (BD PEN NEEDLE MINI U/F) 31G X 5 MM Does not apply Misc, Inject 1 Pen into the skin in the morning, at noon, and at bedtime., Disp: 300 each, Rfl: 11    Omega-3 Fatty Acids (FISH OIL OR), Take by mouth., Disp: , Rfl:     Insulin Pen Needle (BD PEN NEEDLE MINI U/F) 31G X 5 MM Does not apply Misc, Inject 1 pen into the  skin daily., Disp: 90 each, Rfl: 0      Allergies, PMH, SocHx and FHx reviewed and updated as appropriate in Epic on    Continuous Blood Gluc Sensor (DEXCOM G7 SENSOR) Does not apply Misc 1 each Every 10 days. 9 each 0    insulin glargine (BASAGLAR KWIKPEN) 100 UNIT/ML Subcutaneous Solution Pen-injector Inject 24 Units into the skin nightly. 21.6 mL 0    metFORMIN 500 MG Oral Tab Take 1 tablet (500 mg total) by mouth daily with breakfast. 90 tablet 1    Tirzepatide (MOUNJARO) 5 MG/0.5ML Subcutaneous Solution Pen-injector Inject 5 mg into the skin once a week. 2 mL 0    ergocalciferol 1.25 MG (13482 UT) Oral Cap Take 1 capsule (50,000 Units total) by mouth once a week for 12 doses. 12 capsule 0    glipiZIDE 10 MG Oral Tab Take 1 tablet (10 mg total) by mouth daily. 90 tablet 0    atorvastatin 20 MG Oral Tab Take 1 tablet (20 mg total) by mouth nightly. 90 tablet 1    levothyroxine 88 MCG Oral Tab Take 1 tablet (88 mcg total) by mouth before breakfast. 90 tablet 1    insulin aspart (NOVOLOG FLEXPEN) 100 Units/mL Subcutaneous Solution Pen-injector Inject 12 units with meals 15 mL 0    Insulin Pen Needle (BD PEN NEEDLE MINI U/F) 31G X 5 MM Does not apply Misc Inject 1 Pen into the skin in the morning, at noon, in the evening, and at bedtime. 400 each 0    Insulin Pen Needle (BD PEN NEEDLE MINI U/F) 31G X 5 MM Does not apply Misc Inject 1 Pen into the skin in the morning, at noon, and at bedtime. 300 each 11    Omega-3 Fatty Acids (FISH OIL OR) Take by mouth.      Insulin Pen Needle (BD PEN NEEDLE MINI U/F) 31G X 5 MM Does not apply Misc Inject 1 pen into the skin daily. 90 each 0     Allergies   Allergen Reactions    Contrast Dye [Gadolinium Derivatives] HIVES     Iodinated Contrast Media      Flu Virus Vaccine HIVES    Penicillins HIVES and RASH    Iodine (Topical) ITCHING     Oral contrast     Current Outpatient Medications   Medication Sig Dispense Refill    Continuous Blood Gluc Sensor (DEXCOM G7 SENSOR) Does not  apply Misc 1 each Every 10 days. 9 each 0    insulin glargine (BASAGLAR KWIKPEN) 100 UNIT/ML Subcutaneous Solution Pen-injector Inject 24 Units into the skin nightly. 21.6 mL 0    metFORMIN 500 MG Oral Tab Take 1 tablet (500 mg total) by mouth daily with breakfast. 90 tablet 1    Tirzepatide (MOUNJARO) 5 MG/0.5ML Subcutaneous Solution Pen-injector Inject 5 mg into the skin once a week. 2 mL 0    ergocalciferol 1.25 MG (92076 UT) Oral Cap Take 1 capsule (50,000 Units total) by mouth once a week for 12 doses. 12 capsule 0    glipiZIDE 10 MG Oral Tab Take 1 tablet (10 mg total) by mouth daily. 90 tablet 0    atorvastatin 20 MG Oral Tab Take 1 tablet (20 mg total) by mouth nightly. 90 tablet 1    levothyroxine 88 MCG Oral Tab Take 1 tablet (88 mcg total) by mouth before breakfast. 90 tablet 1    insulin aspart (NOVOLOG FLEXPEN) 100 Units/mL Subcutaneous Solution Pen-injector Inject 12 units with meals 15 mL 0    Insulin Pen Needle (BD PEN NEEDLE MINI U/F) 31G X 5 MM Does not apply Misc Inject 1 Pen into the skin in the morning, at noon, in the evening, and at bedtime. 400 each 0    Insulin Pen Needle (BD PEN NEEDLE MINI U/F) 31G X 5 MM Does not apply Misc Inject 1 Pen into the skin in the morning, at noon, and at bedtime. 300 each 11    Omega-3 Fatty Acids (FISH OIL OR) Take by mouth.      Insulin Pen Needle (BD PEN NEEDLE MINI U/F) 31G X 5 MM Does not apply Misc Inject 1 pen into the skin daily. 90 each 0     Past Medical History:   Diagnosis Date    Back problem     pain    Cellulitis and abscess of unspecified digit 06/19/2012    COVID-19 11/2021    hospitalization with covid pneumonia and a PE    COVID-19 06/2022    cough, fever, chills, not hospitalized    Diabetes (HCC)     Disorder of thyroid     DVT (deep venous thrombosis) (HCC)     Dyslipidemia     Esophageal reflux     Gastric polyp     High cholesterol     History of blood clots     Hyperglycemia     Hypothyroid     Iron deficiency anemia     due to  menorrhagia    Obesity     PAT (obstructive sleep apnea)     PONV (postoperative nausea and vomiting)     Pulmonary embolism (HCC)     Sleep apnea     Visual impairment     reading glasses    Vitamin D deficiency      Past Surgical History:   Procedure Laterality Date          x 5    OTHER SURGICAL HISTORY  2011    Lap band placement    OTHER SURGICAL HISTORY  2016    Lap band removal - Dr. Reese @ [facility]    OTHER SURGICAL HISTORY      STAB PHLEBECTOMY, VARICOSE VEINS, 1 EXTREMITY; <20 INCISIONS      WISDOM TEETH REMOVED       Social History     Socioeconomic History    Marital status:     Number of children: 4   Occupational History    Occupation: packaging   Tobacco Use    Smoking status: Never    Smokeless tobacco: Never   Vaping Use    Vaping Use: Never used   Substance and Sexual Activity    Alcohol use: No    Drug use: No   Other Topics Concern    Caffeine Concern Yes     Comment: 1 cup of coffee every morning    Stress Concern No    Weight Concern Yes    Special Diet Yes     Comment: low carb    Exercise No    Seat Belt Yes   Social History Narrative    , 4 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     Social Determinants of Health     Financial Resource Strain: Low Risk  (2023)    Financial Resource Strain     Difficulty of Paying Living Expenses: Not very hard     Med Affordability: No   Transportation Needs: No Transportation Needs (2023)    Transportation Needs     Lack of Transportation: No     Family History   Problem Relation Age of Onset    Other (parkinson's disease) Mother     Stroke Father     Heart Disorder Father     Other (cva) Father     Other (htn) Father     Other (healthy) Other           REVIEW OF SYSTEMS:  Ten point review of systems has been performed and is otherwise negative and/or non-contributory, except as described above.     PHYSICAL EXAM:    Vitals:    24 1240   BP: 130/80   Pulse: 101   Resp:  18          Body mass index is 48.59 kg/m².    CONSTITUTIONAL:  awake, alert, cooperative, no apparent distress, generalized obesity  PSYCH: normal affect, cooperative  EYES:  No proptosis, no ptosis, conjunctiva normal  ENT:  Normocephalic, atraumatic  NECK: no adenopathy, thyroid symmetric, not enlarged and no tenderness,  LUNGS: clear to auscultation bilaterally, no crackles or wheezing  CARDIOVASCULAR:  regular rate and rhythm  ABDOMEN:  normal bowel sounds, soft, non-distended, non-tender  SKIN:  no bruising or bleeding, no rashes and no lesions. Sequela of varicose veins in bilateral lower extremities. Bruising on b/l UE at sites of previous CGM insertions.  DIABETIC FOOT EXAM: Monofilament testing performed to both feet 1/4/24. Patient with reduced sensation at metatarsals. Absent sensation in both heels.   Cushing features - none  Acromegaly features - none      DATA:     Latest Reference Range & Units 04/27/11 08:03 01/03/12 08:40 06/19/12 09:45 05/07/13 08:00 07/21/14 13:05 11/13/15 10:15 08/05/16 09:59 02/12/18 09:26 09/17/18 10:02 01/22/19 10:20 05/29/20 10:08 04/30/21 12:18 08/20/21 10:42 12/06/21 11:59 03/21/22 15:18 06/30/22 10:18 10/10/22 09:45 03/31/23 09:58 06/06/23 16:31 09/14/23 12:35 01/04/24 13:21   HEMOGLOBIN A1C 4.3 - 5.6 %   6.2 (H) 6.0 (H) 5.5               7.6 ! 8.0 !   HEMOGLOBIN A1c <5.7 % 6.4 (H) 6.0 (H)    5.7 (H) 5.7 (H) 7.7 (H) 7.1 (H) 9.0 (H) 8.0 (H) 13.8 (H) 11.5 (H) 12.6 (H) 8.8 (H) 12.0 (H) 13.6 (H) 13.3 (H) 12.6 (H)     (H): Data is abnormally high  !: Data is abnormal     Latest Reference Range & Units 04/27/11 08:03 01/03/12 08:40 06/19/12 09:45 05/07/13 08:00 09/26/13 09:05 07/21/14 13:05 11/13/15 10:15 08/05/16 09:59 02/12/18 09:26 09/17/18 10:02 01/22/19 10:20 05/29/20 10:08 04/30/21 12:18 03/21/22 15:18 03/31/23 09:58 12/20/23 11:06   T4,Free (Direct) 0.8 - 1.7 ng/dL      1.2 1.2 1.1 1.2 1.2  1.1   1.3 1.2   TSH 0.358 - 3.740 mIU/mL 1.51 2.43 1.390 3.110 2.490 1.730 3.630  4.310 4.840 3.300 4.320 2.980 2.570 3.220 4.170 (H) 3.710   ANTI-THYROPEROXIDASE <60 U/mL                <28   (H): Data is abnormally high     Latest Reference Range & Units 03/31/23 09:58   Cholesterol, Total <200 mg/dL 214 (H)   Triglycerides 30 - 149 mg/dL 117   HDL Cholesterol 40 - 59 mg/dL 56   VLDL 0 - 30 mg/dL 21   Chol/HDL Ratio <4.44     NON-HDL CHOLESTEROL <130 mg/dL 158 (H)   LDL Cholesterol Calc <100 mg/dL 137 (H)   (H): Data is abnormally high  (L): Data is abnormally low     Latest Reference Range & Units 03/31/23 09:58   CREATININE UR RANDOM mg/dL 175.00   MALB URINE mg/dL 1.54   MICROALBUMIN See Note: mg/dL    MALB/CRE CALC <=30.0 ug/mg 8.8      Latest Reference Range & Units 04/27/11 08:03 01/03/12 08:40 08/05/16 09:59 02/12/18 09:26 09/17/18 10:02 01/22/19 10:20 12/20/23 11:06   VITAMIN D, 25-OH, TOTAL 30.0 - 100.0 ng/mL 15 (L) 38 29.9 (L) 21.6 (L) 20.1 (L) 32.7 17.3 (L)   (L): Data is abnormally low     Latest Reference Range & Units 06/21/13 12:04 11/13/15 10:15 02/12/18 09:26 12/20/23 11:06   Vitamin B12 193 - 986 pg/mL 435 765 718 1,485 (H)   (H): Data is abnormally high        Continuous Glucose Monitoring Interpretation    Mel Henry has undergone continuous glucose monitoring with the Dexcom G7 CGM with phone (connected to clinic profile).  The blood glucose tracings were evaluated for two weeks prior to office visit.    Blood glucose tracings demonstrated areas of  hyperglycemia areas of hyperglycemia overnight and postprandially (particularly lunch and dinner).  There were no areas of hypoglycemia during the weeks of evaluation.      At goal () 36% of the time.  High 32% of the time.  Very high 31% of the time.  Low <1% of the time.  Very low <1% of the time.        ASSESSMENT AND PLAN:    Mel Henry is a 58 year old female who was referred by Dr. Marci Penn DO for evaluation of:    #Type 2 diabetes complicated by neuropathy,  uncontrolled  -Patient is currently on basal-bolus insulin with 20 units of Levemir and 10 units of NovoLog fixed with meals, glipizide 10 mg daily, Mounjaro 5 mg weekly  -Was previously on metformin 1000mg/500mg, self-discontinued in December 2023  -Started Dexcom G7 in late 2023  -The patient is not anemic and does not have kidney disease, therefore A1c should be reliable  -Reviewed pathophysiology of type 2 diabetes, normal physiology of insulin and glucose, rationale behind multiple daily injections and her other medications  -Reviewed appropriate administration and storage of insulin pens  -Nutrition/DM2 review performed with DM educator in Oct 2023  -Per review of CGM as above, patient with persistent overnight hyperglycemia (denies nighttime snacking) and frequent post-lunch and post-dinner hyperglycemia    PLAN:  -Increase long-acting insulin to 24 units nightly. As Levemir is being discontinued, will switch to different long-acting insulin (Basaglar ordered, will follow up on coverage).   -Continue 10 units Novolog with breakfast, increase to 12-14 units with breakfast/dinner depending on meal size  -Resume metformin 500mg daily  -Continue glipizide 10mg daily  -Continue Mounjaro 5mg daily  -Refilled Dexcom sensors, advised patient to contact Dexcom directly for replacement sensors in the event of sensor failure  -Advised patient to perform a visual check of both feet every night to assess for cuts, ulcers, etc.  -Advised patient to make an appointment with her ophthalmologist for yearly diabetic exam    -Surveillance for Complications & Risks  - HgA1c (goal <7%): 8.0% today  - Renal: No previous albuminuria, not currently on ACE/ARB or SGLT2i. Repeat microalbumin/Cr.  - Blood Pressure (goal <130/80): Yes  - Optho: Eye screening (yearly): Due, advised patient to schedule  - Neuro: Encouraged proper footwear and regular diabetic foot care. Monofilament test performed 1/4/24 with diminished sensation at  metatarsals.  - HLD: Yes, on atorvastatin 20mg daily. Repeat lipid panel.  - Taking ASA: No      #HLD  -Continue atorvastatin 20 mg daily  -LDL level was above goal in March 2023  -Repeat lipid panel fasting, will uptitrate atorvastatin as appropriate to goal LDL    #?Hypothyroidism  -The patient states she has had hypothyroidism for over 10 years, etiology unclear  -The patient currently on levothyroxine 88 mcg  -TFTs in March 2023 were consistent with subclinical hypothyroidism with mildly elevated TSH  -TPO antibody negative  -Obtain thyroglobulin antibody and US thyroid - if both unremarkable/without sequelae of hypothyroidism, consider trial off of levothyroxine to assess for need for thyroid hormone replacement    #Vitamin D deficiency  -Patient with very low vitamin D levels, was started on weekly ergocalciferol x12 weeks    #Elevated B12  -Pt with very elevated B12 level in Dec 2023, advised to hold B12 supplementation    The above plan was discussed in detail with the patient who verbalized understanding and agreement.      A total of 60 minutes was spent today on obtaining history, reviewing pertinent labs, reviewing relevant pathophysiology with patient, evaluating patient, providing multiple treatment options, and completing documentation and orders.      Dania Mayorga DO  [facility]  1/4/2024     Note to patient: The 21 Century Cures Act makes medical notes like these available to patients in the interest of transparency. However, be advised this is a medical document. It is intended as peer to peer communication. It is written in medical language and may contain abbreviations or verbiage that are unfamiliar. It may appear blunt or direct. Medical documents are intended to carry relevant information, facts as evident, and the clinical opinion of the practitioner.

## 2024-01-04 NOTE — PATIENT INSTRUCTIONS
Your A1C today is 8.0%, before it was 7.6%, but that might have been because you were having a lot of lows.   Let's increase the Lantus to 24 units at night.   Let's keep 10 units of Novolog before breakfast, and then 12-14 units of Novolog before lunch and dinner depending on how much you are eating.   Let's restart metformin 500mg ONE time in the day with breakfast, let me know if you get any problems with that.   Let's keep the same Mounjaro and glipizide for now.  Please make an appointment with your eye doctor for your diabetic eye exam.   Please schedule your thyroid ultrasound, if everything looks normal we can try to stop the levothyroxine and see if you need it.   Please do your blood tests when you go for the ultrasound, but they should be fasting.   If your sensor alarms you that the sugar is low, please confirm with your fingerstick before you take a snack.     Return Visit   [X] Physician in early April  [X] Central scheduling # for ultrasound

## 2024-01-17 NOTE — PROGRESS NOTES
Joe rodriguez     Mel's cholesterol remains above goal. I would recommend increasing her atorvastatin to 40mg daily (she can take two tablets of 20 or we can prescribe new tablets of 40mg to the pharmacy).     She has no protein in the urine so it does not look like the diabetes is damaging her kidneys.     Please remind her to schedule her thyroid ultrasound and April follow up.

## 2024-01-18 NOTE — TELEPHONE ENCOUNTER
Phoned patient using Language Line for Lithuanian interpretation, : Myla, ID# [id number]. Reviewed results from labs dated 1/17/24, she verbalized understanding of all. States will need new rx- only has two pills left of Atorvastatin 20mg. Confirmed pharmacy.  Scheduled f/u on 4/3/24.    Pended rx and routed to Dr. Mayorga.

## 2024-01-31 NOTE — PROGRESS NOTES
Joe rodriguez     Mel's thyroid ultrasound looks normal with no abnormalities. Since her ultrasound is normal and her thyroid antibodies are negative, it is reasonable for her to discontinue levothyroxine to see if her thyroid levels stay fine without the medication. She can repeat a TSH/FT4 the week before her April follow up, or earlier if she develops any symptoms of underactive thyroid such as cold intolerance or significant reduction in energy. Thanks!

## 2024-02-01 NOTE — TELEPHONE ENCOUNTER
Note from Dr. Mayorga: Mel's thyroid ultrasound looks normal with no abnormalities. Since her ultrasound is normal and her thyroid antibodies are negative, it is reasonable for her to discontinue levothyroxine to see if her thyroid levels stay fine without the medication. She can repeat a TSH/FT4 the week before her April follow up, or earlier if she develops any symptoms of underactive thyroid such as cold intolerance or significant reduction in energy. Thanks!       RN phoned patient with Language line ID# [id number] Ganesh- patient did not answer, no VM left as there was a different name on the VM.     Rn phoned number again and spoke with Loretta, patients daughter, per DEJON. Loretta verbalized understanding of all and will send MCM updating us on whether or not Mel would like to discontinue her Levothyroxine.     Will await message.    Labs pended and routed for review.

## 2024-02-01 NOTE — TELEPHONE ENCOUNTER
From: Mel Henry  To: Dania Mayorga  Sent: 2/1/2024 11:40 AM CST  Subject: Thyroid medication    Good afternoon, this is Mel's Daugther I spoke to my mom and she agreed to stop the levothyroxine for now. I'll let you know if SX start as discussed in our conversation. Thanks again for taking care of her.

## 2024-02-01 NOTE — TELEPHONE ENCOUNTER
Update: Patients daughter messaged in in patient messages: Patient will be discontinuing her Levothyroxine, will reach out in low thyroid symptoms arise.

## 2024-02-27 NOTE — TELEPHONE ENCOUNTER
LOV:     RTC: early april    FU:    Last Refill:  for 90 day supply    Month Supply Pendin day supply, 3 refills    Refill pended and routed for review.
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
## 2024-02-29 NOTE — TELEPHONE ENCOUNTER
RN phoned pharmacy and gave verbal to pharmacist who confirmed information.    MCM sent to patient.     Closing encounter

## 2024-03-05 NOTE — TELEPHONE ENCOUNTER
She can switch to OTC maintenance (2000 or 5000) daily and repeat a 25OH level with the thyroid function tests she will be doing the week prior to her appt in April. Thanks!

## 2024-03-05 NOTE — TELEPHONE ENCOUNTER
LOV:1/04    RTC:    FU:4/03    Last Refill: 12/21    Lab note: Your vitamin D is very low so I have a prescribed a weekly high-dose vitamin D tablet called ergocalciferol to your pharmacy. You will take this once a week for 12 weeks (three months).

## 2024-04-02 NOTE — TELEPHONE ENCOUNTER
Last vitamin D   Component      Latest Ref Rng 3/27/2024   VITAMIN D, 25-OH, TOTAL      30.0 - 100.0 ng/mL 37.3      Routed to Dr. Mayorga for review.

## 2024-04-02 NOTE — TELEPHONE ENCOUNTER
Patient seeing doctor tomorrow (4/3/2024) denying refill as patient will discuss with provider [facility]

## 2024-04-02 NOTE — TELEPHONE ENCOUNTER
Vitamin D request shredded.     Patient has f/u 4/3- will be discussed then    Closing encounter.

## 2024-04-04 NOTE — TELEPHONE ENCOUNTER
LOV: 01/04/2024     Next office visit: No Apt      Last filled: not filled by office     Medication request: Vitamin D     Order pended and routed to provider

## 2024-04-05 NOTE — TELEPHONE ENCOUNTER
From Dr. Mayorga,    \"Missed her appointment with me this week. She does not need to continue high-dose ergocalciferol as her vitamin D levels are now normal, she can switch to a daily maintenance of 2000 units D2 or D3. Thanks!\"    [employer] message sent to patient.

## 2024-04-09 NOTE — TELEPHONE ENCOUNTER
LOV: 4/1/04    RTC:    FU: 6/26    Last Refill: 1/4    Month Supply Pending:     Last office note: Let's restart metformin 500mg ONE time in the day with breakfast, let me know if you get any problems with that.     Rn phoned pharmacy who confirms there are refills available to patient. Will work on one now.    MCM sent to patient with update

## 2024-05-06 NOTE — TELEPHONE ENCOUNTER
LOV: 1/4/24     Next office visit: 5/10/24     Last filled: 1/18/24  Refill request: Atorvastatin      Order pended and routed to provider

## 2024-05-10 NOTE — PATIENT INSTRUCTIONS
Start Novolog 10 units (short acting insulin) before breakfast, lunch, and dinner.   Start Lantus (long acting insulin) 30 units every night before you sleep. This is similar to Levemir, but they are no longer making Levemir.   Keep taking metformin 500mg.  Stay off of glipizide for now.   I refilled your atorvastatin for cholesterol.   I ordered the next dose of Mounjaro 7.5mg, but let me know if your pharmacy does not have it. We can see if any other pharmacies have it, and if not, then we can continue the 5mg.   If you run out of medication at any time, please call me! I will order it for you! But try to let me know BEFORE it's gone by one week.  Please let me know which eye doctor you will be seeing, and if you need me to give you any names of eye doctors to see.     Return Visit   [  ] Physician in 3 months  [  ] After visit summary

## 2024-05-10 NOTE — PROGRESS NOTES
[facility] Endocrinology, Diabetes, and Metabolism Clinic Visit  5/10/24      CHIEF COMPLAINT:    Chief Complaint   Patient presents with    Follow - Up     Pt here for F/U, per pt need refills and wants   A1c was done on 0104/2024 8.0  A1c was done today with 8.4  Pt stop thyroid medication   Pt c/o fatigue      Interval History:  -Missed last several appointments, reports she ran out of multiple medications a few weeks ago including insulin  -Has only been on metformin for the past several weeks  -Has not yet seen her eye doctor  -Remains off LT4    HISTORY OF PRESENT ILLNESS:  Mel Henry is a 59 year old female with history of hyperlipidemia, obesity, type 2 diabetes, varicose veins, obstructive sleep apnea, hypothyroidism, history of DVT who presented initially 10/4/23 to establish care for diabetes and thyroid. The patient has had variable control of diabetes over the past several years; highest A1c was 13.8% in April 2021.She reports starting insulin in 2022 and reports that her glucose control has been much better since then. Started Mounjaro in 2023. Started Dexcom CGM in late 2023 after establishing with DM educator.     With regard to hypothyroidism, the patient reports that she was diagnosed with hypothyroidism over ~2010 but does not know what the etiology is. She states that she has been taking levothyroxine for over a decade at variable doses. Reports that her primary care physician increased her levothyroxine dose to 88 mcg in 2023.       Diabetes mellitus - Type 2  -Diagnosis - ~2016  -Family history - reports that she is not familiar with most of her family history as much of her family is still in [place], but she knows that her brother has prediabetes  -Current medications - Levemir 20 units, Novolog 10 units with every meal, glipizide 10mg, Mounjaro 5mg weekly, metformin 500mg daily. Pt ran out of all medications over the past several weeks and has only been taking metformin.    -Previous medications - N/A  -History of UTI or yeast infection - had 1 urine infection several years ago  -History of pancreatitis - no  -History of thyroid CA - no  -Blood sugar log - Dexcom G7 as below  -Hypoglycemia - Rarely, reports CGM sometimes alarms her at night that sugars are low and she corrects without confirming fingerstick  -Hypoglycemia awareness - yes  -Hypoglycemia treatment - chocolate candy  -Diet - bread with breakfast, chips and guac for lunch, reports drinking multiple green smoothies, denies juice/soda  -Exercise - Trying to exercise more  -Previous diabetes education - Yes   -Microvascular complications      -No retinopathy.  Last ophthalmology visit >1 year ago. Due for follow-up.      -No nephropathy.        -Has neuropathy in her feet.      -No gastroparesis.   -Macrovascular complications      -No history of CAD.  The patient had an echocardiogram and stress test performed in June 2023 prior to surgery for an abscess.  Cardiac work-up at that time was normal without any systolic or diastolic dysfunction.      -No history of CVA      -History of varicose veins  -Comorbidities      -No hypertension, patient does not take any antihypertensives      -HLD on atorvastatin 20 mg nightly       Immunization History   Administered Date(s) Administered    Pneumococcal Conjugate PCV20 10/17/2022    TD 05/11/2007    Zoster Vaccine Recombinant Adjuvanted (Shingrix) 04/03/2023   Deferred Date(s) Deferred    Influenza Vaccine Refused 10/17/2022         CURRENT MEDICATIONS:      Current Outpatient Medications:     atorvastatin 40 MG Oral Tab, Take 1 tablet (40 mg total) by mouth nightly., Disp: 90 tablet, Rfl: 1    Tirzepatide (MOUNJARO) 7.5 MG/0.5ML Subcutaneous Solution Pen-injector, Inject 7.5 mg into the skin once a week for 4 doses., Disp: 2 mL, Rfl: 1    insulin aspart (NOVOLOG FLEXPEN) 100 Units/mL Subcutaneous Solution Pen-injector, Inject 10 Units into the skin 3 (three) times daily before  meals. Inject 12 units with meals, Disp: 27 mL, Rfl: 1    insulin glargine (LANTUS SOLOSTAR) 100 UNIT/ML Subcutaneous Solution Pen-injector, Inject 30 Units into the skin nightly., Disp: 27 mL, Rfl: 1    Continuous Blood Gluc Sensor (DEXCOM G7 SENSOR) Does not apply Misc, 1 each Every 10 days., Disp: 9 each, Rfl: 3    metFORMIN 500 MG Oral Tab, Take 1 tablet (500 mg total) by mouth daily with breakfast., Disp: 90 tablet, Rfl: 1    glipiZIDE 10 MG Oral Tab, Take 1 tablet (10 mg total) by mouth daily., Disp: 90 tablet, Rfl: 0    Insulin Pen Needle (BD PEN NEEDLE MINI U/F) 31G X 5 MM Does not apply Misc, Inject 1 Pen into the skin in the morning, at noon, in the evening, and at bedtime., Disp: 400 each, Rfl: 0    Insulin Pen Needle (BD PEN NEEDLE MINI U/F) 31G X 5 MM Does not apply Misc, Inject 1 Pen into the skin in the morning, at noon, and at bedtime., Disp: 300 each, Rfl: 11    Omega-3 Fatty Acids (FISH OIL OR), Take by mouth., Disp: , Rfl:     Insulin Pen Needle (BD PEN NEEDLE MINI U/F) 31G X 5 MM Does not apply Misc, Inject 1 pen into the skin daily., Disp: 90 each, Rfl: 0    levothyroxine 88 MCG Oral Tab, Take 1 tablet (88 mcg total) by mouth before breakfast. (Patient not taking: Reported on 5/10/2024), Disp: 90 tablet, Rfl: 1      Allergies, PMH, SocHx and FHx reviewed and updated as appropriate in Epic on    atorvastatin 40 MG Oral Tab Take 1 tablet (40 mg total) by mouth nightly. 90 tablet 1    Tirzepatide (MOUNJARO) 7.5 MG/0.5ML Subcutaneous Solution Pen-injector Inject 7.5 mg into the skin once a week for 4 doses. 2 mL 1    insulin aspart (NOVOLOG FLEXPEN) 100 Units/mL Subcutaneous Solution Pen-injector Inject 10 Units into the skin 3 (three) times daily before meals. Inject 12 units with meals 27 mL 1    insulin glargine (LANTUS SOLOSTAR) 100 UNIT/ML Subcutaneous Solution Pen-injector Inject 30 Units into the skin nightly. 27 mL 1    Continuous Blood Gluc Sensor (DEXCOM G7 SENSOR) Does not apply Misc 1  each Every 10 days. 9 each 3    metFORMIN 500 MG Oral Tab Take 1 tablet (500 mg total) by mouth daily with breakfast. 90 tablet 1    glipiZIDE 10 MG Oral Tab Take 1 tablet (10 mg total) by mouth daily. 90 tablet 0    Insulin Pen Needle (BD PEN NEEDLE MINI U/F) 31G X 5 MM Does not apply Misc Inject 1 Pen into the skin in the morning, at noon, in the evening, and at bedtime. 400 each 0    Insulin Pen Needle (BD PEN NEEDLE MINI U/F) 31G X 5 MM Does not apply Misc Inject 1 Pen into the skin in the morning, at noon, and at bedtime. 300 each 11    Omega-3 Fatty Acids (FISH OIL OR) Take by mouth.      Insulin Pen Needle (BD PEN NEEDLE MINI U/F) 31G X 5 MM Does not apply Misc Inject 1 pen into the skin daily. 90 each 0     Allergies   Allergen Reactions    Contrast Dye [Gadolinium Derivatives] HIVES     Iodinated Contrast Media      Flu Virus Vaccine HIVES    Penicillins HIVES and RASH    Iodine (Topical) ITCHING     Oral contrast     Current Outpatient Medications   Medication Sig Dispense Refill    atorvastatin 40 MG Oral Tab Take 1 tablet (40 mg total) by mouth nightly. 90 tablet 1    Tirzepatide (MOUNJARO) 7.5 MG/0.5ML Subcutaneous Solution Pen-injector Inject 7.5 mg into the skin once a week for 4 doses. 2 mL 1    insulin aspart (NOVOLOG FLEXPEN) 100 Units/mL Subcutaneous Solution Pen-injector Inject 10 Units into the skin 3 (three) times daily before meals. Inject 12 units with meals 27 mL 1    insulin glargine (LANTUS SOLOSTAR) 100 UNIT/ML Subcutaneous Solution Pen-injector Inject 30 Units into the skin nightly. 27 mL 1    Continuous Blood Gluc Sensor (DEXCOM G7 SENSOR) Does not apply Misc 1 each Every 10 days. 9 each 3    metFORMIN 500 MG Oral Tab Take 1 tablet (500 mg total) by mouth daily with breakfast. 90 tablet 1    glipiZIDE 10 MG Oral Tab Take 1 tablet (10 mg total) by mouth daily. 90 tablet 0    Insulin Pen Needle (BD PEN NEEDLE MINI U/F) 31G X 5 MM Does not apply Misc Inject 1 Pen into the skin in the  morning, at noon, in the evening, and at bedtime. 400 each 0    Insulin Pen Needle (BD PEN NEEDLE MINI U/F) 31G X 5 MM Does not apply Misc Inject 1 Pen into the skin in the morning, at noon, and at bedtime. 300 each 11    Omega-3 Fatty Acids (FISH OIL OR) Take by mouth.      Insulin Pen Needle (BD PEN NEEDLE MINI U/F) 31G X 5 MM Does not apply Misc Inject 1 pen into the skin daily. 90 each 0    levothyroxine 88 MCG Oral Tab Take 1 tablet (88 mcg total) by mouth before breakfast. (Patient not taking: Reported on 5/10/2024) 90 tablet 1     Past Medical History:    Back problem    pain    Cellulitis and abscess of unspecified digit    COVID-19    hospitalization with covid pneumonia and a PE    COVID-19    cough, fever, chills, not hospitalized    Diabetes (HCC)    Disorder of thyroid    DVT (deep venous thrombosis) (HCC)    Dyslipidemia    Esophageal reflux    Gastric polyp    High cholesterol    History of blood clots    Hyperglycemia    Hypothyroid    Hypothyroidism    Iron deficiency anemia    due to menorrhagia    Obesity    PAT (obstructive sleep apnea)    PONV (postoperative nausea and vomiting)    Pulmonary embolism (HCC)    Sleep apnea    Visual impairment    reading glasses    Vitamin D deficiency     Past Surgical History:   Procedure Laterality Date          x 5    Other surgical history  2011    Lap band placement    Other surgical history  2016    Lap band removal - Dr. Reese @ [facility]    Other surgical history      Stab phlebectomy, varicose veins, 1 extremity; <20 incisions      Maynard teeth removed       Social History     Socioeconomic History    Marital status:     Number of children: 4   Occupational History    Occupation: packaging   Tobacco Use    Smoking status: Never    Smokeless tobacco: Never   Vaping Use    Vaping status: Never Used   Substance and Sexual Activity    Alcohol use: No    Drug use: No   Other Topics Concern    Caffeine Concern Yes      Comment: 1 cup of coffee every morning    Stress Concern No    Weight Concern Yes    Special Diet Yes     Comment: low carb    Exercise No    Seat Belt Yes   Social History Narrative    , 4 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     Social Determinants of Health     Financial Resource Strain: Low Risk  (2023)    Financial Resource Strain     Difficulty of Paying Living Expenses: Not very hard     Med Affordability: No   Food Insecurity: Declined (4/3/2024)    Received from [employer]     Food Insecurity     Food: 99   Transportation Needs: Declined (4/3/2024)    Received from [employer]     Transportation Needs     Transportation: 99   Physical Activity: Not on File (10/7/2022)    Received from [facility]     Physical Activity     Physical Activity: 0   Stress: Not on File (10/7/2022)    Received from [facility]     Stress     Stress: 0   Social Connections: Not on File (10/7/2022)    Received from [employer]     Social Connections     Social Connections and Isolation: 0   Housing Stability: Declined (4/3/2024)    Received from [employer]     Housing Stability     Housin     Family History   Problem Relation Age of Onset    Other (parkinson's disease) Mother     Stroke Father     Heart Disorder Father     Other (cva) Father     Other (htn) Father     Other (healthy) Other           REVIEW OF SYSTEMS:  Ten point review of systems has been performed and is otherwise negative and/or non-contributory, except as described above.     PHYSICAL EXAM:    Vitals:    05/10/24 1440   BP: 132/88   Pulse: 76   Resp: 18          Body mass index is 48.59 kg/m².    CONSTITUTIONAL:  awake, alert, cooperative, no apparent distress, generalized obesity  PSYCH: normal affect, cooperative  EYES:  No proptosis, no ptosis, conjunctiva normal  ENT:  Normocephalic, atraumatic  NECK: no adenopathy, thyroid symmetric, not enlarged and no tenderness,  LUNGS: clear to auscultation bilaterally, no crackles or  wheezing  CARDIOVASCULAR:  regular rate and rhythm  ABDOMEN:  normal bowel sounds, soft, non-distended, non-tender  SKIN:  no bruising or bleeding, no rashes and no lesions. Sequela of varicose veins in bilateral lower extremities. Bruising on b/l UE at sites of previous CGM insertions.  DIABETIC FOOT EXAM: Monofilament testing performed to both feet 1/4/24. Patient with reduced sensation at metatarsals. Absent sensation in both heels.   Cushing features - none  Acromegaly features - none      DATA:      Ref Range & Units 1/4/24  1:21 PM   HEMOGLOBIN A1C  4.3 - 5.6 % 8.0 Abnormal       Latest Reference Range & Units 03/27/24 10:22   T4,Free (Direct) 0.8 - 1.7 ng/dL 1.1   TSH 0.550 - 4.780 mIU/mL 4.534      Latest Reference Range & Units 12/20/23 11:06   ANTI-THYROPEROXIDASE <60 U/mL <28      Latest Reference Range & Units 01/17/24 09:43   ANTI-THYROGLOBULIN <60 U/mL <15      Latest Reference Range & Units 01/17/24 09:43   Cholesterol, Total <200 mg/dL 186   Triglycerides 30 - 149 mg/dL 66   HDL Cholesterol 40 - 59 mg/dL 71 (H)   VLDL 0 - 30 mg/dL 11   NON-HDL CHOLESTEROL <130 mg/dL 115   LDL Cholesterol Calc <100 mg/dL 103 (H)   (H): Data is abnormally high     Latest Reference Range & Units 01/03/12 08:40 09/17/18 10:02 01/22/19 10:20 05/29/20 10:08 04/30/21 12:18 03/21/22 15:18 03/31/23 09:58 01/17/24 09:43   CREATININE UR RANDOM mg/dL  68.00 83.30 26.30 35.70 27.50 175.00 100.00   MALB URINE mg/dL  1.10 <0.50 <0.50 <0.50 <0.50 1.54 0.78   MICROALBUMIN See Note: mg/dL 0.3          MALB/CRE CALC <=30.0 ug/mg  16.2 See chart for results See chart for results See chart for results See chart for results 8.8 7.8       Continuous Glucose Monitoring Interpretation    Mel Gwendolyn Ras Henry has undergone continuous glucose monitoring with the Dexcom G7 CGM with phone (connected to clinic profile).  The blood glucose tracings were evaluated for two weeks prior to office visit.    Blood glucose tracings  demonstrated areas of hyperglycemia throughout the entire day, no specific pattern  There were no significant hypoglycemia noted during the weeks of evaluation.      At goal () 25% of the time.  High 29% of the time.  Very high 45% of the time.  Low <1% of the time.  Very low <1% of the time.        ASSESSMENT AND PLAN:    Mel Henry is a 59 year old female who was referred by Dr. Marci Penn DO for evaluation of:    #Type 2 diabetes complicated by neuropathy, uncontrolled  -Patient is currently prescribed basal-bolus insulin with 20 units of Levemir and 10 units of NovoLog fixed with meals, glipizide 10 mg daily, Mounjaro 5 mg weekly  -Was previously on metformin 1000mg/500mg, self-discontinued in December 2023  -Started Dexcom G7 in late 2023  -Reviewed multifactorial nature of successful glycemic control involving pharmacotherapy, diet and carbohydrate management, and optimization of physical activity. Reviewed goals of therapy with DM and rationale of A1C/glycemic goals in prevention of development/progression of organ damage and diabetes complications.  -Reviewed appropriate administration and storage of insulin pens  -Nutrition/DM2 review performed with DM educator in Oct 2023  -Pt with very uncontrolled sugars as she ran out of all medications including insulin several weeks ago    PLAN:  -Advised pt to call for refills when medications are running low and not wait until next appointment (though she did miss multiple appointments in the interim)  -Resume Lantus 24 units daily  -Resume 10 units Novolog with breakfast, increase to 12-14 units with breakfast/dinner depending on meal size  -Continue metformin 500mg daily  -Resume glipizide 10mg daily  -Increase Mounjaro to 7.5mg/week  -Refilled atorvastatin 40mg daily  -Refilled Dexcom sensors, advised patient to contact Dexcom directly for replacement sensors in the event of sensor failure  -Advised patient to perform a visual  check of both feet every night to assess for cuts, ulcers, etc.  -Advised patient to make an appointment with her ophthalmologist for yearly diabetic exam    -Surveillance for Complications & Risks  - HgA1c (goal <7%): 8.0% today in January 2024  - Renal: No microalbuminuria Jan 2024. Next due 2025. Not on ACE/ARB or SGLT2i.  - Blood Pressure (goal <130/80): Yes  - Optho: Eye screening (yearly): Due, advised patient to schedule  - Neuro: Encouraged proper footwear and regular diabetic foot care. Monofilament test performed 1/4/24 with diminished sensation at metatarsals.  - HLD: Yes, on atorvastatin 40mg daily. Next lipid panel due Jan 2025.  - Taking ASA: No      #HLD  -Continue atorvastatin 40 mg daily  -Repeat lipid panel Jan 2025    The above plan was discussed in detail with the patient who verbalized understanding and agreement.      Dania Mayorga DO  [facility]  5/10/2024     Note to patient: The 21 Century Cures Act makes medical notes like these available to patients in the interest of transparency. However, be advised this is a medical document. It is intended as peer to peer communication. It is written in medical language and may contain abbreviations or verbiage that are unfamiliar. It may appear blunt or direct. Medical documents are intended to carry relevant information, facts as evident, and the clinical opinion of the practitioner.

## 2024-05-14 NOTE — TELEPHONE ENCOUNTER
Received phone call from [facility]'s pharmacy.    Wanting to confirm directions on Novolog rx sent on 5/10/24- two sets of directions.    Per LOV 5/10/24 , \"Start Novolog 10 units (short acting insulin) before breakfast, lunch, and dinner.\"    Gave verbal update over phone.     Rx list updated with historical entry.    Routing to Dr. Mayorga as MEGAN.

## 2024-05-16 NOTE — TELEPHONE ENCOUNTER
Received MY CHART MESSAGE from patient with the following message:\"I have called multiple pharmacys no one has stock [facility] [place] has 5 MG can you please send script until I can get 7.5 please\"  RX pended and pharmacy updated. routed to provider to review.

## 2024-06-20 NOTE — TELEPHONE ENCOUNTER
LOV:5/10    RTC:     FU: cancelled     Month Supply [place] days with 1 refill     Last office note: -Increase Mounjaro to 7.5mg/week     Pended Rx and routed for review

## 2024-06-20 NOTE — TELEPHONE ENCOUNTER
From: Mel Henry  To: Dania Mayorga  Sent: 6/19/2024 7:39 PM CDT  Subject: Mounjaro     Good evening sorry for the bother can you please send script for 7.5 for my mom to [facility] [place] [facility] [place] they currently have stock. Thanks in advance

## 2024-08-29 NOTE — PROGRESS NOTES
[facility] Note    Name: Mel Henry    Date: 8/29/2024    Mel Henry is a 59 year old female who presents for evaluation of T2DM management.     Chief complaint: Follow - Up (Pt here for f/u, Dexcom was downloaded sent to provider for review, pt has not been taking mounjaro in back order, also pt want metformin to be switch, per pt makes her feet swollen and makes her sick per pt she has stopped the metformin its been over a week/A1C was done today with 10.0 /Last A1C-01/04/2024 8.0/Last Foot exam- 04/03/2023/Last Eye exam- Never)       Subjective:   Initial HPI consult - October 2023    Interval History:  -Stopped metformin as she felt it was making her feet swell  -Increased leg/nerve pain  -Has not been able to find Mounjaro in stock, has not taken it for a few weeks  -Adherent to insulin  -Has not had diabetic eye exam  -Unsure if she is taking glipizide currently    DM hx:  -Diagnosed with diabetes in ~2016  -Family history- yes, strong diabetes family hx    -Re: potential DM medication contraindication (if positive, checkbox selected):  [] History of pancreatitis  [] Personal/fam hx of medullary thyroid cancer/MEN2  [] History of recent/frequent UTI/yeast infxn  [] Previous amputation related to diabetes    -Presence of associated DM complications (if positive, checkbox selected):  [] Macrovascular complications (CAD/CVA/PAD)  [x] Neuropathy  [] Retinopathy  [] Nephropathy  [] HTN  [x] Hyperlipidemia  [] Stroke/TIA  [] Gastroparesis    Continuous Glucose Monitoring Interpretation  Mel Henry has undergone continuous glucose monitoring with the Dexcom G7 CGM with phone (connected to clinic profile).  The blood glucose tracings were evaluated for two weeks prior to office visit. Blood glucose tracings demonstrated areas of hyperglycemia constantly . There were no areas of hypoglycemia notedduring the weeks of evaluation.      Note: only  4 days per data    Very high 100%  High 0%  In range 0%  Low 0%  Very low 0%    Previously trialed/failed DM meds: N/A    History/Other:    Allergies, PMH, SocHx and FHx reviewed and updated as appropriate in Epic on    LEVEMIR FLEXPEN 100 UNIT/ML Subcutaneous Solution Pen-injector Inject 20 Units into the skin daily.      semaglutide (OZEMPIC, 0.25 OR 0.5 MG/DOSE,) 2 MG/3ML Subcutaneous Solution Pen-injector Inject 0.25 mg into the skin once a week. 3 mL 0    insulin aspart (NOVOLOG FLEXPEN) 100 Units/mL Subcutaneous Solution Pen-injector Inject 12 Units into the skin 3 (three) times daily before meals.      atorvastatin 40 MG Oral Tab Take 1 tablet (40 mg total) by mouth nightly. 90 tablet 1    Continuous Blood Gluc Sensor (DEXCOM G7 SENSOR) Does not apply Misc 1 each Every 10 days. 9 each 3    [DISCONTINUED] glipiZIDE 10 MG Oral Tab Take 1 tablet (10 mg total) by mouth daily. 90 tablet 0    Omega-3 Fatty Acids (FISH OIL OR) Take by mouth.      Insulin Pen Needle (BD PEN NEEDLE MINI U/F) 31G X 5 MM Does not apply Misc Inject 1 pen into the skin daily. 90 each 0     Allergies   Allergen Reactions    Contrast Dye [Gadolinium Derivatives] HIVES     Iodinated Contrast Media      Flu Virus Vaccine HIVES    Penicillins HIVES and RASH    Iodine (Topical) ITCHING     Oral contrast     Current Outpatient Medications   Medication Sig Dispense Refill    LEVEMIR FLEXPEN 100 UNIT/ML Subcutaneous Solution Pen-injector Inject 20 Units into the skin daily.      semaglutide (OZEMPIC, 0.25 OR 0.5 MG/DOSE,) 2 MG/3ML Subcutaneous Solution Pen-injector Inject 0.25 mg into the skin once a week. 3 mL 0    insulin aspart (NOVOLOG FLEXPEN) 100 Units/mL Subcutaneous Solution Pen-injector Inject 12 Units into the skin 3 (three) times daily before meals.      atorvastatin 40 MG Oral Tab Take 1 tablet (40 mg total) by mouth nightly. 90 tablet 1    Continuous Blood Gluc Sensor (DEXCOM G7 SENSOR) Does not apply Misc 1 each Every 10 days. 9 each  3    Omega-3 Fatty Acids (FISH OIL OR) Take by mouth.      Insulin Pen Needle (BD PEN NEEDLE MINI U/F) 31G X 5 MM Does not apply Misc Inject 1 pen into the skin daily. 90 each 0    metFORMIN 500 MG Oral Tab Take 1 tablet (500 mg total) by mouth 2 (two) times daily with meals. (Patient not taking: Reported on 8/29/2024)      GLIPIZIDE 10 MG Oral Tab TAKE 1 TABLET BY MOUTH EVERY DAY 90 tablet 1    insulin glargine (LANTUS SOLOSTAR) 100 UNIT/ML Subcutaneous Solution Pen-injector Inject 30 Units into the skin nightly. (Patient not taking: Reported on 8/29/2024) 27 mL 1    levothyroxine 88 MCG Oral Tab Take 1 tablet (88 mcg total) by mouth before breakfast. (Patient not taking: Reported on 8/29/2024) 90 tablet 1     Past Medical History:    Back problem    pain    Cellulitis and abscess of unspecified digit    COVID-19    hospitalization with covid pneumonia and a PE    COVID-19    cough, fever, chills, not hospitalized    Diabetes (HCC)    Disorder of thyroid    DVT (deep venous thrombosis) (HCC)    Dyslipidemia    Esophageal reflux    Gastric polyp    High cholesterol    History of blood clots    Hyperglycemia    Hypothyroid    Hypothyroidism    Iron deficiency anemia    due to menorrhagia    Obesity    PAT (obstructive sleep apnea)    PONV (postoperative nausea and vomiting)    Pulmonary embolism (HCC)    Sleep apnea    Visual impairment    reading glasses    Vitamin D deficiency     Family History   Problem Relation Age of Onset    Other (parkinson's disease) Mother     Stroke Father     Heart Disorder Father     Other (cva) Father     Other (htn) Father     Other (healthy) Other      Social history: Reviewed.      ROS/Exam    REVIEW OF SYSTEMS: Ten point review of systems has been performed and is otherwise negative and/or non-contributory, except as described above.     VITALS  Vitals:    08/29/24 1508   Pulse: 86   Resp: 18   SpO2: 98%   Height: 5' 5\" (1.651 m)       Wt Readings from Last 6 Encounters:   05/10/24  292 lb (132.5 kg)   01/04/24 292 lb (132.5 kg)   10/04/23 292 lb (132.5 kg)   09/14/23 292 lb (132.5 kg)   09/01/23 278 lb (126.1 kg)   09/01/23 278 lb (126.1 kg)       PHYSICAL EXAM  CONSTITUTIONAL:  awake, alert, cooperative, no apparent distress, and appears stated age   PSYCH: normal affect  LUNGS: breathing comfortably  CARDIOVASCULAR:  regular rate   NECK:  no palpable thyroid nodules     Labs/Imaging: Pertinent imaging reviewed.    Overall glucose control:  Lab Results   Component Value Date    A1C 8.0 (A) 01/04/2024    A1C 7.6 (A) 09/14/2023    A1C 12.6 (H) 06/06/2023    A1C 13.3 (H) 03/31/2023    A1C 13.6 (H) 10/10/2022       Supplementary Documentation:   -Surveillance for Diabetes Complications & Risks  Foot exam/neuropathy: Last Foot Exam: 01/04/24    Retinopathy screening: No data recordedNo data recorded Advised pt to schedule exam.     Assessment & Plan:     ICD-10-CM    1. Uncontrolled type 2 diabetes mellitus with hyperglycemia, with long-term current use of insulin (HCC)  E11.65 HEMOGLOBIN A1C    Z79.4 semaglutide (OZEMPIC, 0.25 OR 0.5 MG/DOSE,) 2 MG/3ML Subcutaneous Solution Pen-injector      2. Hypothyroidism, unspecified type  E03.9 TSH and Free T4 [E]      3. Vitamin D deficiency  E55.9 Vitamin D [E]          Mel Henry is a pleasant 59 year old female here for evaluation of:    #Diabetes- PMHx of Type 2 diabetes mellitus diagnosed in ~2016.     -Last A1c value was 8% done 1/4/2024. A1C 10% 8/29/24.  -Goal <7%. Importance of better glucose control in preventing onset/progression of end-organ damage discussed, as well as goals of therapy and clinical significance of A1C.  -Current regimen: Levemir 30 units nightly, Novolog 12 units TID with meals, ?glipizide 10mg  -Previous meds: metformin (discontinued as she felt it was causing foot swelling/pain), Mounjaro (unable to find in stock)  -Reviewed appropriate handling of insulin with the patient  -Reminded pt that she needs to  inform our office if stopping medications on her own or if she is unable to obtain medications from the pharmacy    PLAN:  -Increase Levemir to 36 units daily. Reviewed that Levemir is going out of production and she will need to switch to glargine eventually.  -Increase Novolog to 16 units TID with meals. Pt was taking 30min before eating, advised to switch to 5-10min before eating.   -Resume glipizide 10mg daily  -Will submit for Ozempic as pt having trouble finding Mounjaro in stock and Trulicity was previously not covered. If Ozempic not covered consider PAP for Ozempic.   -Advised pt to schedule yearly eye exam  -Continue monitoring BG 4x/day with CGM    -See above header \"Supplementary Documentation\" for surveillance for diabetes complications & risks    #Nephropathy screening/CKD:   Lab Results   Component Value Date    EGFRCR 102 09/02/2023    MICROALBCREA 7.8 01/17/2024      #Blood pressure control: SBP is not to goal <130   BP Readings from Last 1 Encounters:   05/10/24 132/88   BP Meds:      #Hyperlipidemia/Lipids: LDL is not to goal   Lab Results   Component Value Date     (H) 01/17/2024    TRIG 66 01/17/2024   Cholesterol Meds: atorvastatin Tabs - 40 MG      #Vitamin D deficiency  -Repeat vitamin D levels, consider longterm ergocalciferol for maintenance    The above plan was discussed in detail with the patient who verbalized understanding and agreement.      A total of 60 minutes was spent today on obtaining history, reviewing pertinent labs, reviewing relevant pathophysiology with patient, evaluating patient, providing multiple treatment options, and completing documentation and orders.      Dania Mayorga DO  [facility]  8/29/2024     Note to patient: The 21 Century Cures Act makes medical notes like these available to patients in the interest of transparency. However, be advised this is a medical document. It is intended as peer to peer communication. It is written in medical language  and may contain abbreviations or verbiage that are unfamiliar. It may appear blunt or direct. Medical documents are intended to carry relevant information, facts as evident, and the clinical opinion of the practitioner.

## 2024-08-29 NOTE — PATIENT INSTRUCTIONS
Increase your Levemir to 36 units  Increase Novolog 16 before you eat. Please take this 5-10 minutes before you start eating.   The insulin pens that you are actively using can be outside the fridge, but the new ones can stay in the fridge until it's time to use them.   We'll try to start Ozempic because it's easier to find usually, but if they don't have it or if it's too expensive, let me know and we'll try to get it through the office.   Please make sure you're taking the glipizide 10mg once a day.   Please work on finding out where you can get your diabetic eye exam.     Return Visit   [  ] [facility] in 6-7 weeks, Dr. Mayorga in 12 weeks  [  ] Diabetes Education:    [ ] Schedule with Vance for video check in for glucose in two weeks

## 2024-08-30 NOTE — TELEPHONE ENCOUNTER
RN phoned Loretta, patient's daughter- states they have not picked up Ozempic prescription yet    Reviewed we can try for a PA as patient unable to  Mounjaro due to back order.    Will link Ozempic Coupon and PAP paperwork in [employer] message    PA in CMM for Ozempic 0.25 mg    KEY: [id number]    Questions answered and sent to plan- If you have any questions about your PA submission, contact [employer] at [phone].    Will await determination.     Patient and family interested in Gayatri Nordisk PAP. If PA does not lower price, will move forward with this plan.    Link sent in [employer] Message for patient review.

## 2024-09-03 NOTE — TELEPHONE ENCOUNTER
9/3/24 rcvd via [place] Mail    Notification of PA in place for Ozempic 2mg/3ml solution Pen-injector; effective through 12/31/24    Sent to scanning

## 2024-09-05 NOTE — TELEPHONE ENCOUNTER
Received the following message from provider:\"Please let Mel know her thyroid labs look normal and vitamin D levels are also normal. She can take 2000 units of vitamin D2 or D3 over the counter daily \"  Used  language line to call patients preferred phone #.  No answer but  left message to call office back and provided call back #.   then attempted to call patient home phone # and patient answered phone.  The above information was provided to the patient via .  Patient verbalized understanding and had no questions for staff.   Reminded patient of upcoming appointment 09/12/24 and patient confirmed.    Closing encounter.

## 2024-09-05 NOTE — PROGRESS NOTES
Please let Mel know her thyroid labs look normal and vitamin D levels are also normal. She can take 2000 units of vitamin D2 or D3 over the counter daily. Thanks!

## 2024-09-06 NOTE — TELEPHONE ENCOUNTER
Received Approval for Ozempic in [facility]    RN phoned pharmacy- confirms that Ozempic going through for $314    Pharmacy pulled Ozempic and will re run- states will need to call back to see if price has been affected.

## 2024-09-16 NOTE — PROGRESS NOTES
Patient Office Visit    ASSESSMENT AND PLAN:   1. Routine physical examination  Note: Continue to exercise at least 150 minutes a week and Eat a plant based diet. Please take 2000 IU of vitamin D daily for life to keep your bones strong. Recommended the Tdap and shingles vaccine. Flu vaccine to be done in October     2. Class 3 severe obesity due to excess calories with serious comorbidity and body mass index (BMI) of 50.0 to 59.9 in adult ([facility])  Note: Continue to exercise at least 150 minutes a week and Eat a plant based diet. Patient awaiting follow up for ozempic by her endocrinologist's office. We briefly discussed about seeing the weight loss clinic, but she declined for now    3. Mixed hyperlipidemia  Note: continue statin   - atorvastatin 40 MG Oral Tab; Take 1 tablet (40 mg total) by mouth nightly.  Dispense: 90 tablet; Refill: 1  - Lipid Panel; Future  - AST (SGOT); Future  - ALT(SGPT); Future    4. PAT (obstructive sleep apnea)  Note: needs better compliance with CPAP machine    5. Uncontrolled type 2 diabetes mellitus with hyperglycemia, with long-term current use of insulin ([facility])  Note: currently on insulin. Follows with the endocrinologist   - Basic Metabolic Panel (8) [E]; Future    6 Dermatitis  Note: patient taking clindamycin that was prescribed by her daughter who is a PA. Okay to continue and will start the cream below   - triamcinolone 0.1 % External Cream; Apply twice a day to right lower extremity for 2 weeks. Then take a break for 2 weeks then apply again for 2 weeks  Dispense: 60 g; Refill: 0    7. Screening for cervical cancer  - ThinPrep PAP with HPV Reflex Request B; Future  - ThinPrep PAP with HPV Reflex Request B    8. Edema of right lower extremity  Note: given history of Deep venous thrombosis. Will obtain ultrasound  - US VENOUS DOPPLER LEG RIGHT - DIAG IMG (CPT=93971); Future    9 Primary hypertension  Note: recommended to start lisinopril-hydrochlorothiazide, but patient declined.  She will check her blood pressure at home and follow up. She will also stop the salt water she is drinking. She understands the risks of uncontrolled Hypertension      10 Vaginal yeast infection  - fluconazole (DIFLUCAN) 150 MG Oral Tab; Take one tablet today and if no improvement in 3 days, may repeat another tablet  Dispense: 2 tablet; Refill: 0    11. Cellulitis of submandibular region  Note: on clindamycin and improving    RTC In 3-4 weeks           Patient/Caregiver Education: Patient/Caregiver Education: There are no barriers to learning. Medical education done. Outcome: Patient verbalizes understanding. Patient is notified to call with any questions, complications, allergies, or worsening or changing symptoms.  Patient is to call with any side effects or complications from the treatments as a result of today.      Reviewed Past Medical History and   Patient Active Problem List   Diagnosis    Mixed hyperlipidemia    PAT (obstructive sleep apnea)    Class 3 severe obesity due to excess calories with serious comorbidity and body mass index (BMI) of 50.0 to 59.9 in adult (HCC)    History of DVT (deep vein thrombosis)    Venous embolism and thrombosis of superficial vessels of right lower extremity    Thrombophlebitis of superficial veins of right lower extremity    Uncontrolled type 2 diabetes mellitus with hyperglycemia, with long-term current use of insulin (HCC)    Cellulitis of submandibular region       Orders Placed This Encounter   Procedures    Basic Metabolic Panel (8) [E]     Standing Status:   Future     Standing Expiration Date:   9/16/2025     Order Specific Question:   Release to patient     Answer:   Immediate    Lipid Panel     Standing Status:   Future     Standing Expiration Date:   9/16/2025    AST (SGOT)     Standing Status:   Future     Standing Expiration Date:   9/16/2025    ALT(SGPT)     Standing Status:   Future     Standing Expiration Date:   9/16/2025    Diabetic Retinopathy Exam  OU -  Both Eyes     Standing Status:   Future     Standing Expiration Date:   9/16/2025     Requested Prescriptions     Signed Prescriptions Disp Refills    insulin aspart (NOVOLOG FLEXPEN) 100 Units/mL Subcutaneous Solution Pen-injector 45 mL 0     Sig: Inject 16 Units into the skin 3 (three) times daily before meals.    atorvastatin 40 MG Oral Tab 90 tablet 1     Sig: Take 1 tablet (40 mg total) by mouth nightly.    triamcinolone 0.1 % External Cream 60 g 0     Sig: Apply twice a day to right lower extremity for 2 weeks. Then take a break for 2 weeks then apply again for 2 weeks    fluconazole (DIFLUCAN) 150 MG Oral Tab 2 tablet 0     Sig: Take one tablet today and if no improvement in 3 days, may repeat another tablet         Marci Penn,   CC:  Chief Complaint   Patient presents with    Swelling     Leg swelling and pain HX of DVT and cellulitis         HPI:   Mel Henry is a 59 year old female who presents for a physical and to discuss the following concerns    Right lower extremity swelling and itching: she has been swimming for the past 2 months and has been using the [employer]. She noticed pain and swelling about 3 days ago. Her daughter gave her clindamycin which has really helped, but she still has a lot of itching.   Diabetes: she does not have ozempic and waiting for it to be approved by her insurance. She has been compliant with her insulin  Obesity: continue to gain weight and is hoping ozempic will get approved  CPAP: has not been compliant with it due to electricity concerns, but will try to get it fixed soon  Yeast infection: gets vaginal yeast infection and requesting diflucan  Elevated blood pressure: has never been told she has a high blood pressure and has no symptoms from it.     Past Medical History:    Back problem    pain    Cellulitis and abscess of unspecified digit    COVID-19    hospitalization with covid pneumonia and a PE    COVID-19    cough, fever, chills, not  hospitalized    Diabetes (HCC)    Disorder of thyroid    DVT (deep venous thrombosis) (HCC)    Dyslipidemia    Esophageal reflux    Gastric polyp    High cholesterol    History of blood clots    Hyperglycemia    Hypothyroid    Hypothyroidism    Iron deficiency anemia    due to menorrhagia    Obesity    PAT (obstructive sleep apnea)    PONV (postoperative nausea and vomiting)    Pulmonary embolism (HCC)    Sleep apnea    Visual impairment    reading glasses    Vitamin D deficiency       Past Surgical History:   Procedure Laterality Date          x 5    Other surgical history  2011    Lap band placement    Other surgical history  2016    Lap band removal - Dr. Reese @ [facility]    Other surgical history      Stab phlebectomy, varicose veins, 1 extremity; <20 incisions      [place] teeth removed         Social History:  Social History     Socioeconomic History    Marital status:     Number of children: 4   Occupational History    Occupation: packaging   Tobacco Use    Smoking status: Never    Smokeless tobacco: Never   Vaping Use    Vaping status: Never Used   Substance and Sexual Activity    Alcohol use: No    Drug use: No   Other Topics Concern    Caffeine Concern Yes     Comment: 1 cup of coffee every morning    Stress Concern No    Weight Concern Yes    Special Diet Yes     Comment: low carb    Exercise No    Seat Belt Yes   Social History Narrative    , 4 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     Social Determinants of Health     Financial Resource Strain: Low Risk  (2023)    Financial Resource Strain     Difficulty of Paying Living Expenses: Not very hard     Med Affordability: No   Food Insecurity: Declined (4/3/2024)    Received from [employer]    Food Insecurity     Food: 99   Transportation Needs: Declined (4/3/2024)    Received from [employer]    Transportation Needs     Transportation: 99   Physical Activity: Not on File (10/7/2022)     Received from [employer]    Physical Activity     Physical Activity: 0   Stress: Not on File (10/7/2022)    Received from [employer]    Stress     Stress: 0   Social Connections: Not on File (2024)    Received from [employer]    Social Connections     Connectedness: 0   Housing Stability: Declined (4/3/2024)    Received from [employer]    Housing Stability     Housin     Family History:  Family History   Problem Relation Age of Onset    Other (parkinson's disease) Mother     Stroke Father     Heart Disorder Father     Other (cva) Father     Other (htn) Father     Other (healthy) Other      Allergies:  Allergies   Allergen Reactions    Contrast Dye [Gadolinium Derivatives] HIVES     Iodinated Contrast Media      Flu Virus Vaccine HIVES    Penicillins HIVES and RASH    Iodine (Topical) ITCHING     Oral contrast     Current Meds:  Current Outpatient Medications on File Prior to Visit   Medication Sig Dispense Refill    cholecalciferol (VITAMIN D3) 125 MCG (5000 UT) Oral Cap Take 1 capsule (5,000 Units total) by mouth daily.      LEVEMIR FLEXPEN 100 UNIT/ML Subcutaneous Solution Pen-injector Inject 36 Units into the skin daily.      semaglutide (OZEMPIC, 0.25 OR 0.5 MG/DOSE,) 2 MG/3ML Subcutaneous Solution Pen-injector Inject 0.25 mg into the skin once a week. 3 mL 0    GLIPIZIDE 10 MG Oral Tab TAKE 1 TABLET BY MOUTH EVERY DAY 90 tablet 1    Continuous Blood Gluc Sensor (DEXCOM G7 SENSOR) Does not apply Misc 1 each Every 10 days. 9 each 3    Omega-3 Fatty Acids (FISH OIL OR) Take by mouth.      Insulin Pen Needle (BD PEN NEEDLE MINI U/F) 31G X 5 MM Does not apply Misc Inject 1 pen into the skin daily. 90 each 0     No current facility-administered medications on file prior to visit.         REVIEW OF SYSTEMS   Constitutional: no fatigue normal energy no weight changes   HENT: normal sinuses and no mouth issues   Eyes: . normal vision no eye pain   Respiratory: normal respirations no cough   Cardiovascular: no CP, or  palpitations   Gastrointestinal: normal bowels and no abd pains   Genitourinary:  normal urination no hematuria, no frequency   Musculoskeletal: no pains in arms/legs, normal range of motion   Skin: no rashes or skin lesions that are new   Neurological:  no weakness, no numbness, normal gait   Hematological:  no bruises or bleeding   Psychiatric/Behavioral: normal mood no anxiety normal behavior     BP (!) 144/102 (BP Location: Right arm, Patient Position: Sitting, Cuff Size: large)   Pulse 77   Temp 98 °F (36.7 °C) (Temporal)   Resp 16   Ht 5' 5\" (1.651 m)   Wt (!) 316 lb (143.3 kg)   LMP  (Approximate)   SpO2 98%   BMI 52.59 kg/m²     PHYSICAL EXAM:   Constitutional: Vital signs reviewed as noted, well developed, in no acute distress.   HENT: NCAT, bilateral ear canal and tympanic membrane appear normal  Eyes: pupils reactive bilaterally  Neck: No thyroidmegaly  Cardiovascular: nl s1 s2 no m/r/g  Pulmonary/Chest: CTA bilaterally with no wheezes  Abdominal: Soft NT normal Bowel sounds  : normal external genitalia without skin lesions or rashes, normal cervix, no lesions, slight white discharge noted   Extremities: + swelling noted in the right lower extremity   Neurological:  no weakness in UE and LE, reflexes are normal  Skin: erythema and excoriations with papules noted on the right lower extremity   Psychiatric:normal mood     MA: Yahaira Torres is present in the room

## 2024-09-16 NOTE — PATIENT INSTRUCTIONS
Continue to exercise at least 150 minutes a week and Eat a plant based diet     Please take 2000 IU of vitamin D daily for life to keep your bones strong    Please see your dentist every 6 months  Continue with regular eye exams    We did your pap smear today as well as your eye exam    I have ordered diflucan (yeast medication pill) as you are on antibiotics    I have ordered a cream for the itching as well as an ultrasound of the leg for to make sure no blood clot    Your blood pressure was very high today and I highly recommended a blood pressure medication as our goal blood pressure in diabetics is to be less than 130/80. Please stop the salt water as that could be increasing your levels. Your weight can be increasing your level. Please check your blood pressure at home for 2 weeks and send me your readings. Please see me back in 3-4 weeks for a blood pressure check    I have ordered blood test for you    Continue follow up with the endocrinologist    I highly recommend the shingles and the tetanus vaccine    See me back in 3-4 weeks   ---------------------------------------  Continúe haciendo ejercicio al menos 150 minutos a la semana y coma tonny dieta basada en plantas     [place] 2000 UI de vitamina D al día de por kodak para mantener willem huesos kisha    Por favor, visite a viveros dentista cada 6 meses  Continúe con los exámenes oculares regulares    Hoy le hicimos la prueba de Papanicolaou y el examen de la vista    He pedido diflucan (píldora de medicamento para la levadura) ya que está tomando antibióticos    He pedido tonny crema para la picazón, así cary tonny ecografía de la pierna para asegurarme de que no haya coágulos de megan    Viveros presión arterial estaba muy jhoana hoy y le recomendé encarecidamente un medicamento para la presión arterial, ya que nuestro objetivo de presión arterial en diabéticos es ser inferior a 130/80. Por favor, detenga el agua salada, ya que podría estar aumentando willem niveles. Tu peso  puede estar aumentando tu nivel. Por favor, controle viveros presión arterial en casa jose roberto 2 semanas y envíeme willem lecturas. Por favor, vuelve a verme en 3-4 semanas para un control de la presión arterial    He ordenado un análisis de megan para usted    Continuar el seguimiento con el endocrinólogo    Recomiendo encarecidamente el herpes zóster y el tétanos vaccine

## 2024-09-26 NOTE — TELEPHONE ENCOUNTER
From: Mel Henry  To: Dania Mayorga  Sent: 9/26/2024 1:39 PM CDT  Subject: Decom reciever    Good afternoon I was hope you can send my mom a new dexcom 7 reciever to [facility] pharmacy hers went out last night she changed the battery and still doesn't work thanks in advance

## 2024-09-26 NOTE — TELEPHONE ENCOUNTER
Aidan Daughter Loretta returned call about her moms dexcom 7.    Yesterday Mel's  started not working. Daughter said they changed the battery but it still will not turn on and does not function at all.   Daughter states her mom has had this  for approximately 2-3 years.  Routing to provider.

## 2024-09-26 NOTE — TELEPHONE ENCOUNTER
Called daughter to get additional information. Left VOICEMAIL message to call staff back and provided call back #.

## 2024-10-10 NOTE — PATIENT INSTRUCTIONS
Plan de seguimiento:   Regrese en aproximadamente 6 semanas (alrededor del 21/11/2024) para el seguimiento programado con el Dr. Mayorga.    - Eye exam: [facility]: [phone]    Plan:  - asegúrese de presentar willem impuestos; luego podremos presentarlos para:  - Programa de asistencia al paciente Ozempic y dexcom, también podemos usar el programa de asistencia al paciente lindsey para obtener insulina por $0  - Estoy pidiendo a las enfermeras que llamen para katie si ozempic es algo más barato ahora que lo cubierto (inicialmente creo que tenía un precio más alto porque la aprobación aún no había terminado).    Medicamentos:   - Cuando hayas terminado con levemir, iniciarás Lantus.   - Está serafin evon lantus y novolog al mismo tiempo.  - aumentar levemir/lantus de 36u --> 40u al día  - Novolog:   Desayuno: 14u diarios  Almuerzo: 20u diarios  Sujatha: 20u diarias  - continuar con glipizida a la dosis actual: 10 mg al día por ahora  ----    Follow up plan:   Return in about 6 weeks (around 11/21/2024) for scheduled follow up with Dr. Mayorga.    Plan:  - make sure to file your taxes- then we can submit for:  - Ozempic patient assistance program and dexcom, we can also use the lindsey patient assistance program to get insulin for $0  - I am having the nurses call to see if ozempic is somewhat cheaper now that covered (initially I think that it was running at a higher price because the approval wasn't through yet?)    Medications:   - When you are done with levemir, you will start Lantus.   - It is ok to take lantus and novolog at the same time  - increase levemir/lantus from 36u --> 40u daily  - Novolog:   Breakfast: 14u daily  Lunch:  20u daily  Dinner: 20u daily  - continue glipizide at the current dose- 10mg daily for now    Updated/current diabetes medication instructions:  Diabetic Medications               insulin glargine 100 UNIT/ML Subcutaneous Solution (Taking) Inject 40 Units into the skin nightly.    insulin  aspart (NOVOLOG FLEXPEN) 100 Units/mL Subcutaneous Solution Pen-injector (Taking) Take 14u with breakfast and 20u with lunch and dinner. Max TDD 54u daily    GLIPIZIDE 10 MG Oral Tab TAKE 1 TABLET BY MOUTH EVERY DAY            Office phone number: [phone]; phones are open Monday-Friday 8:30-4:30.   Thank you for visiting our office. We look forward to working with you to reach your health goals. As a reminder, if you need refills, please request early so there is enough time to process the request. We ask that you provide at least 5 days' notice before a refill is due, so there is time to send a request to pharmacy, process the refill, and ensure there are no other problems with obtaining the medication (backorders, prior authorization paperwork, etc). Routine refills will not be addressed on weekends, so please submit these requests during the week.    Blood sugar targets:  Before breakfast: , 2 hours after meals: <180 (preferably <150), A1C goal: <7.0%  Time in Range goal is higher than 80% if using a continuous glucose monitor (Dexcom or Rama).  Time in Range can be found within the Dexcom G7 dutch, on Clarity if using Dexcom G6, or on LibreView if using Rama.    HOW TO TREAT LOW BLOOD SUGAR (Hypoglycemia)  Low blood sugar= Less than 70, or if you start to have symptoms (below)  Symptoms: Shaking or trembling, fast heart rate, extreme hunger, sweating, confusion/difficulty concentrating, dizziness.    How to treat a low blood sugar if you are able to eat/drink: The Rule of 15  If you are using continuous glucose monitor that says you are low, but you do not have any symptoms, verify on fingerstick that your blood sugar is actually low before treating.   Eat 15 grams of carbs (see examples below)  Check your blood sugar after 15 minutes. If it’s still below your target range, have another serving.   Repeat these steps until it’s in your target range. Once it’s in range, if you're nervous about your  sugar going low again, have a protein source (ie, a spoonful of peanut butter).   If you have a CGM you want to look for how your arrow has changed. If you arrow is pointed up or sideways after 15 min, give your CGM more time OR check with a finger stick. Try not to eat more food until at least 15 min after the first BG check - otherwise you risk having a rebound high.  If you are experiencing symptoms and you are unable to check your blood glucose for any reason, treat the hypoglycemia.  If someone has a low blood sugar and is unconscious: Don’t hesitate to call 911. If someone is unconscious and glucagon is not available or someone does not know how to use it, call 911 immediately.    To treat a low, I recommend you carry with you easy, pre-portioned treatment for low blood sugars that are 15G of carbs:   - Children sized squeeze pouch applesauce (high fiber + carbs help prevent too high of a spike)  - Small children's sized juicebox- 15g carb --> 4oz juice box  - Glucose tablets from [employer], you can find them near diabetes supplies --> Note, you will need to eat 3-4 tablets to get to 15g of carbs  - Children sized fruit snack pack- look for one with 15 grams of total carbohydrate  - Choice of how to treat your low is important. Complex carbs, or foods that contain fats along with carbs (like chocolate) can slow the absorption of glucose and should NOT be used to treat an emergency low

## 2024-10-10 NOTE — PROGRESS NOTES
[facility] Note    Name: Mel Henry    Date: 10/10/2024    Mel Henry is a 59 year old female who presents for evaluation of T2DM management.     Chief complaint: Follow - Up (6 wk f/u for diabetes. Last visit saw dr murcia. Patient states Sugars have been high after eating but overall better. Patient did not start ozempic.)       Subjective:   Initial HPI consult - October 2023  DM hx:  -Diagnosed with diabetes in ~2016  -Family history- yes, strong diabetes family hx    -Re: potential DM medication contraindication (if positive, checkbox selected):  [] History of pancreatitis  [] Personal/fam hx of medullary thyroid cancer/MEN2  [] History of recent/frequent UTI/yeast infxn  [] Previous amputation related to diabetes    -Presence of associated DM complications (if positive, checkbox selected):  [] Macrovascular complications (CAD/CVA/PAD)  [x] Neuropathy  [] Retinopathy  [] Nephropathy  [] HTN  [x] Hyperlipidemia  [] Stroke/TIA  [] Gastroparesis    Interval history:  10/10/2024-dom/ Keeley EVANS.Last A1c value was 9.3% done 10/10/2024.  Taking clindamycin for a cellulitis on the RLE which has gotten better  Her BG are still elevated particularly after lunch and dinner. She exercises at night for 1.5h in a pool then comes home very hungry  She eats a lower carb breakfast, so sometimes BG drop  Hasn't gotten eye exam-- money is tight right now  Also didn't submit her taxes yet, so ineligible for patient assistance program for now    Last office visit, resumed glipizide, adjusted levemir (now switched to lantus) and attempted to re-send ozempic  but was too pricey at the pharmacy. She hasn't completed last years taxes, so unable to qualify for patient assistance program. She started glipizide which seems to help.   DM meds at visit:   - lantus 36u daily  - novolog 16u TID AC   - glipizide 10mg daily  - dexcom G7 with reader      Continuous Glucose Monitoring  Interpretation  Mel Mcginnis Henry has undergone continuous glucose monitoring with their CGM.  The blood glucose tracings were evaluated for two weeks prior to office visit.   Blood glucose tracings demonstrated areas of hyperglycemia post-meal, particularly post-lunch and dinner  There were occasional hypoglycemia, particularly after breakfast. .          Previously trialed/failed DM meds: N/A    History/Other:    Allergies, PMH, SocHx and FHx reviewed and updated as appropriate in Epic on    clindamycin 150 MG Oral Cap Take 1 capsule (150 mg total) by mouth 3 (three) times daily.      insulin glargine 100 UNIT/ML Subcutaneous Solution Inject 30 Units into the skin nightly.      cholecalciferol (VITAMIN D3) 125 MCG (5000 UT) Oral Cap Take 1 capsule (5,000 Units total) by mouth daily.      insulin aspart (NOVOLOG FLEXPEN) 100 Units/mL Subcutaneous Solution Pen-injector Inject 16 Units into the skin 3 (three) times daily before meals. 45 mL 0    atorvastatin 40 MG Oral Tab Take 1 tablet (40 mg total) by mouth nightly. 90 tablet 1    LEVEMIR FLEXPEN 100 UNIT/ML Subcutaneous Solution Pen-injector Inject 36 Units into the skin daily.      Continuous Blood Gluc Sensor (DEXCOM G7 SENSOR) Does not apply Misc 1 each Every 10 days. 9 each 3    Insulin Pen Needle (BD PEN NEEDLE MINI U/F) 31G X 5 MM Does not apply Misc Inject 1 pen into the skin daily. 90 each 0     Allergies   Allergen Reactions    Contrast Dye [Gadolinium Derivatives] HIVES     Iodinated Contrast Media      Flu Virus Vaccine HIVES    Penicillins HIVES and RASH    Iodine (Topical) ITCHING     Oral contrast     Current Outpatient Medications   Medication Sig Dispense Refill    clindamycin 150 MG Oral Cap Take 1 capsule (150 mg total) by mouth 3 (three) times daily.      insulin glargine 100 UNIT/ML Subcutaneous Solution Inject 30 Units into the skin nightly.      cholecalciferol (VITAMIN D3) 125 MCG (5000 UT) Oral Cap Take 1 capsule (5,000 Units  total) by mouth daily.      insulin aspart (NOVOLOG FLEXPEN) 100 Units/mL Subcutaneous Solution Pen-injector Inject 16 Units into the skin 3 (three) times daily before meals. 45 mL 0    atorvastatin 40 MG Oral Tab Take 1 tablet (40 mg total) by mouth nightly. 90 tablet 1    LEVEMIR FLEXPEN 100 UNIT/ML Subcutaneous Solution Pen-injector Inject 36 Units into the skin daily.      Continuous Blood Gluc Sensor (DEXCOM G7 SENSOR) Does not apply Misc 1 each Every 10 days. 9 each 3    Insulin Pen Needle (BD PEN NEEDLE MINI U/F) 31G X 5 MM Does not apply Misc Inject 1 pen into the skin daily. 90 each 0    triamcinolone 0.1 % External Cream Apply twice a day to right lower extremity for 2 weeks. Then take a break for 2 weeks then apply again for 2 weeks 60 g 0    fluconazole (DIFLUCAN) 150 MG Oral Tab Take one tablet today and if no improvement in 3 days, may repeat another tablet 2 tablet 0    GLIPIZIDE 10 MG Oral Tab TAKE 1 TABLET BY MOUTH EVERY DAY 90 tablet 1    Omega-3 Fatty Acids (FISH OIL OR) Take by mouth.       Past Medical History:    Back problem    pain    Cellulitis and abscess of unspecified digit    COVID-19    hospitalization with covid pneumonia and a PE    COVID-19    cough, fever, chills, not hospitalized    Diabetes (HCC)    Disorder of thyroid    DVT (deep venous thrombosis) (HCC)    Dyslipidemia    Esophageal reflux    Gastric polyp    High cholesterol    History of blood clots    Hyperglycemia    Hypothyroid    Hypothyroidism    Iron deficiency anemia    due to menorrhagia    Obesity    PAT (obstructive sleep apnea)    PONV (postoperative nausea and vomiting)    Pulmonary embolism (HCC)    Sleep apnea    Visual impairment    reading glasses    Vitamin D deficiency     Family History   Problem Relation Age of Onset    Other (parkinson's disease) Mother     Stroke Father     Heart Disorder Father     Other (cva) Father     Other (htn) Father     Other (healthy) Other      Social history:  Reviewed.      ROS/Exam    REVIEW OF SYSTEMS: Ten point review of systems has been performed and is otherwise negative and/or non-contributory, except as described above.     VITALS  Vitals:    10/10/24 1411   BP: 122/72   Pulse: 95   Resp: 20   SpO2: 98%         Wt Readings from Last 6 Encounters:   09/16/24 (!) 316 lb (143.3 kg)   05/10/24 292 lb (132.5 kg)   01/04/24 292 lb (132.5 kg)   10/04/23 292 lb (132.5 kg)   09/14/23 292 lb (132.5 kg)   09/01/23 278 lb (126.1 kg)       PHYSICAL EXAM  CONSTITUTIONAL:  awake, alert, cooperative, no apparent distress, and appears stated age   PSYCH: normal affect  LUNGS: breathing comfortably  CARDIOVASCULAR:  regular rate     Labs/Imaging: Pertinent imaging reviewed.    Overall glucose control:  Lab Results   Component Value Date    A1C 9.3 (A) 10/10/2024    A1C 10.0 (A) 09/03/2024    A1C 8.0 (A) 01/04/2024    A1C 7.6 (A) 09/14/2023    A1C 12.6 (H) 06/06/2023       Supplementary Documentation:   -Surveillance for Diabetes Complications & Risks  Foot exam/neuropathy: Last Foot Exam: 01/04/24    Retinopathy screening: No data recordedNo data recorded Advised pt to schedule exam.     Assessment & Plan:     ICD-10-CM    1. Uncontrolled type 2 diabetes mellitus with hyperglycemia, with long-term current use of insulin ([facility])  E11.65 POC Hemoglobin A1C    Z79.4       2. Hyperlipidemia associated with type 2 diabetes mellitus ([facility])  E11.69     E78.5           Mel Henry is a pleasant 59 year old female here for evaluation of:    #Type 2 Diabetes- PMHx of Type 2 diabetes mellitus diagnosed in ~2016.     -Last A1c value was 9.3% done 10/10/2024.   -Goal <7%. Importance of better glucose control in preventing onset/progression of end-organ damage discussed, as well as goals of therapy and clinical significance of A1C.  - Ongoing overt hyperglycemia in the evening. Limited in med adjustments due to prev intolerances and affordabillity concerns  -Today, will adjust  and cover her lunch and dinner with more insulin and her breakfast with less as she eats lower carb and tends to drop before lunch  - Unclear if glipizide providing much glycemic effect given ongoing hyperglycemia but can likely incr next time once we improve BG some  - Would benefit from ozempic; technically it was approved but cost was high. Will have nurses re-confirm price with pharmacy. If unaffordable, will pursue lindsey patient assistance program (can get novolog, tresiba and ozempic for her), but first needs to submit 2023 taxes    PLAN:  - increase levemir/lantus from 36u --> 40u daily  - Novolog:   Breakfast: 16 --> 14u daily  Lunch:  16--> 20u daily  Dinner: 16--> 20u daily  - continue glipizide 10mg daily for now  - addendum 10/10/2024 after visit: RN spoke with patient's daughter who is an RN and got an ozempic sample to give her mom. Recommend patient starts ozempic 0.25mg weekly x 4 weeks and continue with med changes made above, if goes low, contact the clinic for insulin adjustments.    - intolerant of metformin, foot swelling/pain  - unable to obtain mounjaro d/t cost and shortages, but worked well  - consider SGLT2i with patient assistance program once BG improve due to UTI risk  -Continue monitoring BG 4x/day with CGM  -See above header \"Supplementary Documentation\" for surveillance for diabetes complications & risks    Nephropathy screening  - stable  Lab Results   Component Value Date    EGFRCR 102 09/02/2023    MICROALBCREA 7.8 01/17/2024      Blood pressure:  - SBP is to goal <130   - continue follow up with PCP   BP Readings from Last 1 Encounters:   10/10/24 122/72   BP Meds:         #Hyperlipidemia  - LDL is not to goal   Lab Results   Component Value Date     (H) 01/17/2024    TRIG 66 01/17/2024   Cholesterol Meds: atorvastatin Tabs - 40 MG        #Vitamin D deficiency  - Hx of vit D deficiency; stable on 9/2024. Recommend taking vit D 2000IU daily.        Domonique Green  APN  Endocrinology, Diabetes & Metabolism   10/10/2024    A total of 45 minutes was spent today on obtaining history, reviewing pertinent labs, evaluating patient, providing multiple treatment options, reinforcing diet/exercise and compliance, and completing documentation.       Note to patient: The 21 Century Cures Act makes medical notes like these available to patients in the interest of transparency. However, be advised this is a medical document. It is intended as peer to peer communication. It is written in medical language and may contain abbreviations or verbiage that are unfamiliar. It may appear blunt or direct. Medical documents are intended to carry relevant information, facts as evident, and the clinical opinion of the practitioner.

## 2024-11-21 NOTE — PROGRESS NOTES
[facility] Note    Name: Mel Henry    Date: 11/21/2024    Mel Henry is a 59 year old female who presents for evaluation of T2DM management.     Chief complaint: Follow - Up (Pt here to f/u on diabetes, sugars have been ok per pt, pt has no other concerns /Last A1C-10/10/2024 9.3/Last Foot exam- 01/04/2024/Last Eye exam- none on file per pt has been more than 2 years and will set one up )       Subjective:   Initial HPI consult - October 2023    DM hx:  -Diagnosed with diabetes in ~2016  -Family history- yes, strong diabetes family hx    -Re: potential DM medication contraindication (if positive, checkbox selected):  [] History of pancreatitis  [] Personal/fam hx of medullary thyroid cancer/MEN2  [] History of recent/frequent UTI/yeast infxn  [] Previous amputation related to diabetes    -Presence of associated DM complications (if positive, checkbox selected):  [] Macrovascular complications (CAD/CVA/PAD)  [x] Neuropathy  [] Retinopathy  [] Nephropathy  [] HTN  [x] Hyperlipidemia  [] Stroke/TIA  [] Gastroparesis    Interval history:  10/10/2024-dom/ [facility].Last A1c value was 9.3% done 10/10/2024.  Taking clindamycin for a cellulitis on the RLE which has gotten better  Her BG are still elevated particularly after lunch and dinner. She exercises at night for 1.5h in a pool then comes home very hungry  She eats a lower carb breakfast, so sometimes BG drop  Hasn't gotten eye exam-- money is tight right now  Also didn't submit her taxes yet, so ineligible for patient assistance program for now    Last office visit, resumed glipizide, adjusted levemir (now switched to lantus) and attempted to re-send ozempic  but was too pricey at the pharmacy. She hasn't completed last years taxes, so unable to qualify for patient assistance program. She started glipizide which seems to help.   DM meds at visit:   - lantus 36u daily  - novolog 16u TID AC   - glipizide 10mg  daily  - dexcom G7 with reader    11/21/24 with Dr. Mayorga.  Doing well, had one hypo episode while swimming but overall feels sugars are much improved. Not having frequent hypos. Was able to start Ozempic 0.25mg weekly as the patient's daughter was able to get it for her through work - tolerating. No current issues. Planning to schedule her eye exam. Eating large meal at nighttime after coming home from the gym.         Continuous Glucose Monitoring Interpretation  Mel Henry has undergone continuous glucose monitoring with their CGM.  The blood glucose tracings were evaluated for two weeks prior to office visit.   Blood glucose tracings demonstrated areas of hyperglycemia post-meal, particularly post-lunch and dinner  There were occasional hypoglycemia, particularly after breakfast. .    Very high 16%  High 26%  In range 58%  Low 0%  Very low <1%    Notable pattern of hyperglycemia following large meals at lunch and before bedtime.      Previously trialed/failed DM meds: N/A    History/Other:    Allergies, PMH, SocHx and FHx reviewed and updated as appropriate in Epic on    Insulin Glargine Solostar 100 UNIT/ML Subcutaneous Solution Pen-injector Inject 36 Units into the skin daily. 33 mL 1    glipiZIDE 10 MG Oral Tab Take 1 tablet (10 mg total) by mouth daily. 90 tablet 1    Continuous Glucose Sensor (DEXCOM G7 SENSOR) Does not apply Misc 1 each Every 10 days. Use as directed every 10 days 3 each 2    clindamycin 150 MG Oral Cap Take 1 capsule (150 mg total) by mouth 3 (three) times daily.      insulin glargine 100 UNIT/ML Subcutaneous Solution Inject 40 Units into the skin nightly.      insulin aspart (NOVOLOG FLEXPEN) 100 Units/mL Subcutaneous Solution Pen-injector Take 18 units with breakfast and 18 units with dinner. Max TDD 54u daily      cholecalciferol (VITAMIN D3) 125 MCG (5000 UT) Oral Cap Take 1 capsule (5,000 Units total) by mouth daily.      atorvastatin 40 MG Oral Tab Take 1 tablet  (40 mg total) by mouth nightly. 90 tablet 1    triamcinolone 0.1 % External Cream Apply twice a day to right lower extremity for 2 weeks. Then take a break for 2 weeks then apply again for 2 weeks 60 g 0    fluconazole (DIFLUCAN) 150 MG Oral Tab Take one tablet today and if no improvement in 3 days, may repeat another tablet 2 tablet 0    Continuous Blood Gluc Sensor (DEXCOM G7 SENSOR) Does not apply Misc 1 each Every 10 days. 9 each 3    Omega-3 Fatty Acids (FISH OIL OR) Take by mouth.      Insulin Pen Needle (BD PEN NEEDLE MINI U/F) 31G X 5 MM Does not apply Misc Inject 1 pen into the skin daily. 90 each 0     Allergies   Allergen Reactions    Contrast Dye [Gadolinium Derivatives] HIVES     Iodinated Contrast Media      Flu Virus Vaccine HIVES    Penicillins HIVES and RASH    Iodine (Topical) ITCHING     Oral contrast     Current Outpatient Medications   Medication Sig Dispense Refill    Insulin Glargine Solostar 100 UNIT/ML Subcutaneous Solution Pen-injector Inject 36 Units into the skin daily. 33 mL 1    glipiZIDE 10 MG Oral Tab Take 1 tablet (10 mg total) by mouth daily. 90 tablet 1    Continuous Glucose Sensor (DEXCOM G7 SENSOR) Does not apply Misc 1 each Every 10 days. Use as directed every 10 days 3 each 2    clindamycin 150 MG Oral Cap Take 1 capsule (150 mg total) by mouth 3 (three) times daily.      insulin glargine 100 UNIT/ML Subcutaneous Solution Inject 40 Units into the skin nightly.      insulin aspart (NOVOLOG FLEXPEN) 100 Units/mL Subcutaneous Solution Pen-injector Take 18 units with breakfast and 18 units with dinner. Max TDD 54u daily      cholecalciferol (VITAMIN D3) 125 MCG (5000 UT) Oral Cap Take 1 capsule (5,000 Units total) by mouth daily.      atorvastatin 40 MG Oral Tab Take 1 tablet (40 mg total) by mouth nightly. 90 tablet 1    triamcinolone 0.1 % External Cream Apply twice a day to right lower extremity for 2 weeks. Then take a break for 2 weeks then apply again for 2 weeks 60 g 0     fluconazole (DIFLUCAN) 150 MG Oral Tab Take one tablet today and if no improvement in 3 days, may repeat another tablet 2 tablet 0    Continuous Blood Gluc Sensor (DEXCOM G7 SENSOR) Does not apply Misc 1 each Every 10 days. 9 each 3    Omega-3 Fatty Acids (FISH OIL OR) Take by mouth.      Insulin Pen Needle (BD PEN NEEDLE MINI U/F) 31G X 5 MM Does not apply Misc Inject 1 pen into the skin daily. 90 each 0     Past Medical History:    Back problem    pain    Cellulitis and abscess of unspecified digit    COVID-19    hospitalization with covid pneumonia and a PE    COVID-19    cough, fever, chills, not hospitalized    Diabetes (HCC)    Disorder of thyroid    DVT (deep venous thrombosis) (HCC)    Dyslipidemia    Esophageal reflux    Gastric polyp    High cholesterol    History of blood clots    Hyperglycemia    Hypothyroid    Hypothyroidism    Iron deficiency anemia    due to menorrhagia    Obesity    PAT (obstructive sleep apnea)    PONV (postoperative nausea and vomiting)    Pulmonary embolism (HCC)    Sleep apnea    Visual impairment    reading glasses    Vitamin D deficiency     Family History   Problem Relation Age of Onset    Other (parkinson's disease) Mother     Stroke Father     Heart Disorder Father     Other (cva) Father     Other (htn) Father     Other (healthy) Other      Social history: Reviewed.      ROS/Exam    REVIEW OF SYSTEMS: Ten point review of systems has been performed and is otherwise negative and/or non-contributory, except as described above.     VITALS  Vitals:    11/21/24 1240   BP: 128/88   Pulse: 101   Resp: 18   SpO2: 98%   Weight: (!) 315 lb (142.9 kg)   Height: 5' 5\" (1.651 m)           Wt Readings from Last 6 Encounters:   11/21/24 (!) 315 lb (142.9 kg)   09/16/24 (!) 316 lb (143.3 kg)   05/10/24 292 lb (132.5 kg)   01/04/24 292 lb (132.5 kg)   10/04/23 292 lb (132.5 kg)   09/14/23 292 lb (132.5 kg)       PHYSICAL EXAM  CONSTITUTIONAL:  awake, alert, cooperative, no apparent distress,  and appears stated age   PSYCH: normal affect  LUNGS: breathing comfortably  CARDIOVASCULAR:  regular rate     Labs/Imaging: Pertinent imaging reviewed.    Overall glucose control:  Lab Results   Component Value Date    A1C 9.3 (A) 10/10/2024    A1C 10.0 (A) 09/03/2024    A1C 8.0 (A) 01/04/2024    A1C 7.6 (A) 09/14/2023    A1C 12.6 (H) 06/06/2023       Supplementary Documentation:   -Surveillance for Diabetes Complications & Risks  Foot exam/neuropathy: Last Foot Exam: 01/04/24    Retinopathy screening: No data recordedNo data recorded Advised pt to schedule exam.     Assessment & Plan:     ICD-10-CM    1. Uncontrolled type 2 diabetes mellitus with hyperglycemia, without long-term current use of insulin (HCC)  E11.65 Insulin Glargine Solostar 100 UNIT/ML Subcutaneous Solution Pen-injector     glipiZIDE 10 MG Oral Tab     Continuous Glucose Sensor (DEXCOM G7 SENSOR) Does not apply Misc            Mel Henry is a pleasant 59 year old female here for evaluation of:    #Type 2 Diabetes- PMHx of Type 2 diabetes mellitus diagnosed in ~2016.     -Last A1c value was 9.3% done 10/10/2024. GMI today 7.6%.  -Goal <7%. Importance of better glucose control in preventing onset/progression of end-organ damage discussed, as well as goals of therapy and clinical significance of A1C.  -Ongoing overt hyperglycemia after lunch/dinner. Limited in med adjustments due to prev intolerances and affordabillity concerns    PLAN:  - Continue Lantus 36 units daily  - Increase Novolog to 18 units with lunch and dinner/bedtime meal. Advised pt to increase to 20 units if BG still >200 with eating on the sensor.   - Continue glipizide 10mg daily  - Continue Ozempic 0.25mg daily  - Will attempt to get medications (GLP1, SGLT2i) through PAP once pt files taxes  - Pt reminded to schedule eye appt  - intolerant of metformin, foot swelling/pain  - unable to obtain mounjaro d/t cost and shortages, but worked well  - consider SGLT2i  with patient assistance program once BG improve due to UTI risk  -Continue monitoring BG 4x/day with CGM  -See above header \"Supplementary Documentation\" for surveillance for diabetes complications & risks    Nephropathy screening  - stable  Lab Results   Component Value Date    EGFRCR 102 09/02/2023    MICROALBCREA 7.8 01/17/2024      Blood pressure:  - SBP is to goal <130   - continue follow up with PCP   BP Readings from Last 1 Encounters:   11/21/24 128/88   BP Meds:         #Hyperlipidemia  - LDL is not to goal   Lab Results   Component Value Date     (H) 01/17/2024    TRIG 66 01/17/2024   Cholesterol Meds: atorvastatin Tabs - 40 MG        #Vitamin D deficiency  - Hx of vit D deficiency; stable on 9/2024. Recommend taking vit D 2000IU daily.        Dania Mayorga DO  Endocrinology, Diabetes & Metabolism   11/21/2024    A total of 45 minutes was spent today on obtaining history, reviewing pertinent labs, evaluating patient, providing multiple treatment options, reinforcing diet/exercise and compliance, and completing documentation.       Note to patient: The 21 Century Cures Act makes medical notes like these available to patients in the interest of transparency. However, be advised this is a medical document. It is intended as peer to peer communication. It is written in medical language and may contain abbreviations or verbiage that are unfamiliar. It may appear blunt or direct. Medical documents are intended to carry relevant information, facts as evident, and the clinical opinion of the practitioner.

## 2024-11-21 NOTE — PATIENT INSTRUCTIONS
Summary of today's visit:  Keep the same Lantus 36 units. Keep the same glipizide and Ozempic as well. Let me know if your daughter is not able to get the Ozempic for you for any reason.   For your breakfast, increase the Novolog to 18 units. If you're still the sensor is going >200, then increase the Novolog to 20 units.   For dinner after the gym, increase the Novolog to 18 units. If you're still the sensor is going >200, then increase the Novolog to 20 units.   Please schedule your diabetic eye exam prior to your follow up visit and have your doctor fax the report to our office.    General follow up information:  Please let us know if you require any refills at least 1 week prior to your medication running out. If you do run out of medication, please call our office ASAP to request refills (do not wait until your follow up).   Please call our office if sugars at home are consistently greater than 250 or less than 70 for medication adjustment (do not wait until your follow up appointment).  The on-call pager is for urgent matters only. If you are a type 1 diabetic and run out of insulin after business hours 8AM-4PM, you may call the on-call pager for a refill to a 24 hour pharmacy. All other refill requests should be requested during business hours.    Return Visit:  []  Physician in 3 months      HOW TO TREAT LOW BLOOD SUGAR (Hypoglycemia)  Low blood sugar = Less than 70, or if you start to have symptoms  Symptoms: Shaking or trembling, fast heart rate, extreme hunger, sweating, confusion/difficulty concentrating, dizziness    How to treat a low blood sugar if you are able to eat/drink: The Rule of 15/15  If you are using a continuous glucose monitor that says you are low, but you do not have any symptoms, verify on fingerstick that your blood sugar is actually <70 before treating.   Eat 15 grams of carbs (see examples below)  Check your blood sugar after 15 minutes. If it’s still below your target range, have  another serving.   Repeat these steps until sugar is >90. Once it’s in range, if you're nervous about your sugar going low again, have a protein source (ie, a spoonful of peanut butter).   If you have a CGM, you want to look for how your arrow has changed. If you arrow is pointed up or sideways after 15 min, give your CGM more time OR check with a finger stick. Try not to eat more food until at least 15 min after the first BG check - otherwise you risk spiking your sugar too high.  If you are experiencing symptoms and you are unable to check your blood glucose for any reason, treat the hypoglycemia.  If someone has a low blood sugar and is unconscious: Don’t hesitate to call 911. Use emergency glucagon. If someone is unconscious and glucagon is not available or someone does not know how to use it, call 911 immediately.     To treat a low, carry with you easy, pre-portioned treatment for low blood sugars that are 15G of carbs:   - Children sized squeeze pouch applesauce (high fiber + carbs help prevent too high of a spike)  - Small children's sized juicebox- 15g carb --> 4oz juice box  - Glucose tablets from [employer], you can find them near diabetes supplies --> Note, you will need to eat 3-4 tablets to get to 15g of carbs  - Child sized fruit snack pack- look for one with 15 grams of total carbohydrate  - Choice of how to treat your low is important. Complex carbs, or foods that contain fats along with carbs (like chocolate) can slow the absorption of glucose and should NOT be used to treat an emergency low.

## 2025-03-10 NOTE — PATIENT INSTRUCTIONS
Summary of today's visit:  Let's increase the Lantus to 36 units every night.  Let's increase the Novolog to 20 units before each meal.   Continue the same glipizide.   Ask if your daughter is able to get either 0.5mg, 1mg, or 2mg pens of Ozempic because a stronger dose would be better.  Please schedule your diabetic eye exam prior to your follow up visit and have your doctor fax the report to our office.  I will let you know when I have your thyroid testing back.     General follow up information:  Please let us know if you require any refills at least 1 week prior to your medication running out. If you do run out of medication, please call our office ASAP to request refills (do not wait until your follow up).   Please call our office if sugars at home are consistently greater than 250 or less than 70 for medication adjustment (do not wait until your follow up appointment).  Lab results and imaging will typically be reviewed at follow up appointments, or within 3-5 business days of ALL results being in if you do not have an appointment scheduled in the near future. Our office will contact you for any abnormal results requiring more urgent follow up or action.   The on-call pager is for urgent matters only. If you are a type 1 diabetic and run out of insulin after business hours 8AM-4PM, you may call the on-call pager for a refill to a 24 hour pharmacy. All other refill requests should be requested during business hours.    Return Visit:  []  [facility] in 6 weeks  []  Physician in 3-4 months      HOW TO TREAT LOW BLOOD SUGAR (Hypoglycemia)  Low blood sugar = Less than 70, or if you start to have symptoms  Symptoms: Shaking or trembling, fast heart rate, extreme hunger, sweating, confusion/difficulty concentrating, dizziness    How to treat a low blood sugar if you are able to eat/drink: The Rule of 15/15  If you are using a continuous glucose monitor that says you are low, but you do not have any symptoms, verify on  fingerstick that your blood sugar is actually <70 before treating.   Eat 15 grams of carbs (see examples below)  Check your blood sugar after 15 minutes. If it’s still below your target range, have another serving.   Repeat these steps until sugar is >90. Once it’s in range, if you're nervous about your sugar going low again, have a protein source (ie, a spoonful of peanut butter).   If you have a CGM, you want to look for how your arrow has changed. If you arrow is pointed up or sideways after 15 min, give your CGM more time OR check with a finger stick. Try not to eat more food until at least 15 min after the first BG check - otherwise you risk spiking your sugar too high.  If you are experiencing symptoms and you are unable to check your blood glucose for any reason, treat the hypoglycemia.  If someone has a low blood sugar and is unconscious: Don’t hesitate to call 911. Use emergency glucagon. If someone is unconscious and glucagon is not available or someone does not know how to use it, call 911 immediately.     To treat a low, carry with you easy, pre-portioned treatment for low blood sugars that are 15G of carbs:   - Children sized squeeze pouch applesauce (high fiber + carbs help prevent too high of a spike)  - Small children's sized juicebox- 15g carb --> 4oz juice box  - Glucose tablets from [employer], you can find them near diabetes supplies --> Note, you will need to eat 3-4 tablets to get to 15g of carbs  - Child sized fruit snack pack- look for one with 15 grams of total carbohydrate  - Choice of how to treat your low is important. Complex carbs, or foods that contain fats along with carbs (like chocolate) can slow the absorption of glucose and should NOT be used to treat an emergency low.

## 2025-03-10 NOTE — PROGRESS NOTES
[facility] Note    Name: Mel Henry    Date: 3/10/2025    Mel Henry is a 60 year old female who presents for evaluation of T2DM management.     Chief complaint: Follow - Up (Pt here for diabetes, pt wants to discuss thyroid medication, pt wants to talk about her insulin hurting when she administrate it./A1C- 9.3/Last A1C-10/10/2024 9.3/Last Foot exam- 01/04/2024/Last Eye exam- none )       Subjective:   Initial HPI consult - October 2023    DM hx:  -Diagnosed with diabetes in ~2016  -Family history- yes, strong diabetes family hx    -Re: potential DM medication contraindication (if positive, checkbox selected):  [] History of pancreatitis  [] Personal/fam hx of medullary thyroid cancer/MEN2  [] History of recent/frequent UTI/yeast infxn  [] Previous amputation related to diabetes    -Presence of associated DM complications (if positive, checkbox selected):  [] Macrovascular complications (CAD/CVA/PAD)  [x] Neuropathy  [] Retinopathy  [] Nephropathy  [] HTN  [x] Hyperlipidemia  [] Stroke/TIA  [] Gastroparesis    Interval history:  10/10/2024-dom/ Keeley EVANS.Last A1c value was 9.3% done 10/10/2024.  Taking clindamycin for a cellulitis on the RLE which has gotten better  Her BG are still elevated particularly after lunch and dinner. She exercises at night for 1.5h in a pool then comes home very hungry  She eats a lower carb breakfast, so sometimes BG drop  Hasn't gotten eye exam-- money is tight right now  Also didn't submit her taxes yet, so ineligible for patient assistance program for now    Last office visit, resumed glipizide, adjusted levemir (now switched to lantus) and attempted to re-send ozempic  but was too pricey at the pharmacy. She hasn't completed last years taxes, so unable to qualify for patient assistance program. She started glipizide which seems to help.   DM meds at visit:   - lantus 36u daily  - novolog 16u TID AC   - glipizide 10mg daily  -  dexcom G7 with reader    11/21/24 with Dr. Mayorga.  Doing well, had one hypo episode while swimming but overall feels sugars are much improved. Not having frequent hypos. Was able to start Ozempic 0.25mg weekly as the patient's daughter was able to get it for her through work - tolerating. No current issues. Planning to schedule her eye exam. Eating large meal at nighttime after coming home from the gym.     3/10/2025 w/Dr. Mayorga  -A1c 9.3% today, GMI 9.4%  -Patient currently taking Lantus 32 units every evening, reports is occasionally burning and causing bruising likely when hitting blood vessels with injection  -Taking 16 units of NovoLog prior to each meal  -Taking 0.25 mg of Ozempic, provided by her daughter through her work  -Son reports that she is not controlling her diet and eating high carb (multiple tortillas with each meal, etc.)  -She is overdue for an eye exam  -She reports she is gaining weight and wants to have her thyroid levels rechecked    Continuous Glucose Monitoring Interpretation  Mel Henry has undergone continuous glucose monitoring with their CGM.  The blood glucose tracings were evaluated for two weeks prior to office visit.   Blood glucose tracings demonstrated areas of hyperglycemia postmeal  There were no areas of hypoglycemia noted.    Very high 50%   High 29 %  In range 20 %  Low 1 %  Very low 0 %          Previously trialed/failed DM meds: N/A    History/Other:    Allergies, PMH, SocHx and FHx reviewed and updated as appropriate in Epic on    Insulin Glargine Solostar 100 UNIT/ML Subcutaneous Solution Pen-injector Inject 36 Units into the skin daily. 33 mL 1    insulin aspart (NOVOLOG FLEXPEN) 100 Units/mL Subcutaneous Solution Pen-injector Inject 20 Units into the skin 3 (three) times daily before meals. 54 mL 1    glipiZIDE 10 MG Oral Tab Take 1 tablet (10 mg total) by mouth daily. 90 tablet 1    Continuous Glucose Sensor (DEXCOM G7 SENSOR) Does not apply Misc 1  each Every 10 days. Use as directed every 10 days 3 each 2    clindamycin 150 MG Oral Cap Take 1 capsule (150 mg total) by mouth 3 (three) times daily.      insulin glargine 100 UNIT/ML Subcutaneous Solution Inject 40 Units into the skin nightly.      cholecalciferol (VITAMIN D3) 125 MCG (5000 UT) Oral Cap Take 1 capsule (5,000 Units total) by mouth daily.      atorvastatin 40 MG Oral Tab Take 1 tablet (40 mg total) by mouth nightly. 90 tablet 1    triamcinolone 0.1 % External Cream Apply twice a day to right lower extremity for 2 weeks. Then take a break for 2 weeks then apply again for 2 weeks 60 g 0    fluconazole (DIFLUCAN) 150 MG Oral Tab Take one tablet today and if no improvement in 3 days, may repeat another tablet 2 tablet 0    Continuous Blood Gluc Sensor (DEXCOM G7 SENSOR) Does not apply Misc 1 each Every 10 days. 9 each 3    Omega-3 Fatty Acids (FISH OIL OR) Take by mouth.      Insulin Pen Needle (BD PEN NEEDLE MINI U/F) 31G X 5 MM Does not apply Misc Inject 1 pen into the skin daily. 90 each 0     Allergies   Allergen Reactions    Contrast Dye [Gadolinium Derivatives] HIVES     Iodinated Contrast Media      Flu Virus Vaccine HIVES    Penicillins HIVES and RASH    Iodine (Topical) ITCHING     Oral contrast     Current Outpatient Medications   Medication Sig Dispense Refill    Insulin Glargine Solostar 100 UNIT/ML Subcutaneous Solution Pen-injector Inject 36 Units into the skin daily. 33 mL 1    insulin aspart (NOVOLOG FLEXPEN) 100 Units/mL Subcutaneous Solution Pen-injector Inject 20 Units into the skin 3 (three) times daily before meals. 54 mL 1    glipiZIDE 10 MG Oral Tab Take 1 tablet (10 mg total) by mouth daily. 90 tablet 1    Continuous Glucose Sensor (DEXCOM G7 SENSOR) Does not apply Misc 1 each Every 10 days. Use as directed every 10 days 3 each 2    clindamycin 150 MG Oral Cap Take 1 capsule (150 mg total) by mouth 3 (three) times daily.      insulin glargine 100 UNIT/ML Subcutaneous Solution  Inject 40 Units into the skin nightly.      cholecalciferol (VITAMIN D3) 125 MCG (5000 UT) Oral Cap Take 1 capsule (5,000 Units total) by mouth daily.      atorvastatin 40 MG Oral Tab Take 1 tablet (40 mg total) by mouth nightly. 90 tablet 1    triamcinolone 0.1 % External Cream Apply twice a day to right lower extremity for 2 weeks. Then take a break for 2 weeks then apply again for 2 weeks 60 g 0    fluconazole (DIFLUCAN) 150 MG Oral Tab Take one tablet today and if no improvement in 3 days, may repeat another tablet 2 tablet 0    Continuous Blood Gluc Sensor (DEXCOM G7 SENSOR) Does not apply Misc 1 each Every 10 days. 9 each 3    Omega-3 Fatty Acids (FISH OIL OR) Take by mouth.      Insulin Pen Needle (BD PEN NEEDLE MINI U/F) 31G X 5 MM Does not apply Misc Inject 1 pen into the skin daily. 90 each 0    Insulin Pen Needle (BD PEN NEEDLE [place] U/F) 32G X 4 MM Does not apply Misc Used to inject 4 times daily. 90 day supply. 400 each 0     Past Medical History:    Back problem    pain    Cellulitis and abscess of unspecified digit    COVID-19    hospitalization with covid pneumonia and a PE    COVID-19    cough, fever, chills, not hospitalized    Diabetes (HCC)    Disorder of thyroid    DVT (deep venous thrombosis) (HCC)    Dyslipidemia    Esophageal reflux    Gastric polyp    High cholesterol    History of blood clots    Hyperglycemia    Hypothyroid    Hypothyroidism    Iron deficiency anemia    due to menorrhagia    Obesity    PAT (obstructive sleep apnea)    PONV (postoperative nausea and vomiting)    Pulmonary embolism (HCC)    Sleep apnea    Visual impairment    reading glasses    Vitamin D deficiency     Family History   Problem Relation Age of Onset    Other (parkinson's disease) Mother     Stroke Father     Heart Disorder Father     Other (cva) Father     Other (htn) Father     Other (healthy) Other      Social history: Reviewed.      ROS/Exam    REVIEW OF SYSTEMS: Ten point review of systems has been  performed and is otherwise negative and/or non-contributory, except as described above.     VITALS  Vitals:    03/10/25 0836   BP: 132/80   Pulse: 74   SpO2: 99%   Weight: (!) 321 lb (145.6 kg)   Height: 5' 5\" (1.651 m)             Wt Readings from Last 6 Encounters:   03/10/25 (!) 321 lb (145.6 kg)   11/21/24 (!) 315 lb (142.9 kg)   09/16/24 (!) 316 lb (143.3 kg)   05/10/24 292 lb (132.5 kg)   01/04/24 292 lb (132.5 kg)   10/04/23 292 lb (132.5 kg)       PHYSICAL EXAM  CONSTITUTIONAL:  awake, alert, cooperative, no apparent distress, and appears stated age   PSYCH: normal affect  LUNGS: breathing comfortably  CARDIOVASCULAR:  regular rate     Labs/Imaging: Pertinent imaging reviewed.    Overall glucose control:  Lab Results   Component Value Date    A1C 9.3 (A) 03/10/2025    A1C 9.3 (A) 10/10/2024    A1C 10.0 (A) 09/03/2024    A1C 8.0 (A) 01/04/2024    A1C 7.6 (A) 09/14/2023       Supplementary Documentation:   -Surveillance for Diabetes Complications & Risks  Foot exam/neuropathy: Last Foot Exam: 01/04/24    Retinopathy screening: No data recordedNo data recorded Advised pt to schedule exam.     Assessment & Plan:     ICD-10-CM    1. Hypothyroidism, unspecified type  E03.9 TSH and Free T4 [E]      2. Uncontrolled type 2 diabetes mellitus with hyperglycemia, without long-term current use of insulin (HCC)  E11.65 POC Hemoglobin A1C     Insulin Glargine Solostar 100 UNIT/ML Subcutaneous Solution Pen-injector     Ophthalmology Referral - In Network      3. Uncontrolled type 2 diabetes mellitus with hyperglycemia, with long-term current use of insulin ([facility])  E11.65 insulin aspart (NOVOLOG FLEXPEN) 100 Units/mL Subcutaneous Solution Pen-injector    Z79.4               Mel Henry is a pleasant 60 year old female here for evaluation of:    #Type 2 Diabetes- PMHx of Type 2 diabetes mellitus diagnosed in ~2016.     -Last A1c value was 9.3% done 3/10/2025.   -Goal <7%. Importance of better glucose  control in preventing onset/progression of end-organ damage discussed, as well as goals of therapy and clinical significance of A1C.  -Ongoing overt hyperglycemia throughout the day, son reports patient is not monitoring her diet and eating high carb meals.  Limited in med adjustments due to prev intolerances and affordabillity concerns    PLAN:  -Increase Lantus to 36 units every evening  - Increase Novolog to 20 units with all 3 meals  - Continue glipizide 10mg daily  - Continue Ozempic 0.25mg weekly, advised patient to find out if her daughter can obtain higher doses of Ozempic to provide her.  Prefer to max medication if possible.  - Will attempt to get medications (GLP1, SGLT2i) through PAP once pt files taxes  - Pt reminded to schedule eye appt, referral provided for ophthalmology  - intolerant of metformin, foot swelling/pain  - unable to obtain mounjaro d/t cost and shortages, but worked well  - consider SGLT2i with patient assistance program once BG improves due to UTI risk  -Continue monitoring BG 4x/day with CGM  -Patient is on insulin with frequent fluctuations of blood glucose and will therefore benefit from CGM  -Lipid panel and microalbumin/creatinine ratio ordered  -See above header \"Supplementary Documentation\" for surveillance for diabetes complications & risks    Nephropathy screening  - stable  Lab Results   Component Value Date    EGFRCR 96 03/07/2025    MICROALBCREA 7.8 01/17/2024      Blood pressure:  - SBP is to goal <130   - continue follow up with PCP   BP Readings from Last 1 Encounters:   03/10/25 132/80   BP Meds:         #Hyperlipidemia  - LDL is not to goal   Lab Results   Component Value Date     (H) 01/17/2024    TRIG 66 01/17/2024   Cholesterol Meds: atorvastatin Tabs - 40 MG        #Vitamin D deficiency  - Hx of vit D deficiency; stable on 9/2024. Recommend taking vit D 2000IU daily.     #? Hypothyroidism  -Patient was on levothyroxine low-dose for many years, antithyroid  antibodies have been negative  -Levothyroxine was discontinued for several months in 2024 and repeat thyroid levels without medication were within normal therefore patient stayed off levothyroxine  -Will repeat TFTs now as she is concerned about weight gain, though reviewed that weight gain is multifactorial       Dania Mayorga DO  Endocrinology, Diabetes & Metabolism   3/10/2025      Note to patient: The 21 Century Cures Act makes medical notes like these available to patients in the interest of transparency. However, be advised this is a medical document. It is intended as peer to peer communication. It is written in medical language and may contain abbreviations or verbiage that are unfamiliar. It may appear blunt or direct. Medical documents are intended to carry relevant information, facts as evident, and the clinical opinion of the practitioner.

## 2025-03-10 NOTE — TELEPHONE ENCOUNTER
Received call from Yvrose with [facility] pharmacy needing clarification on short acting insulin.    States they can fill 45 ml or 60 ml- gave verbal order to fill for 60 ml.     Pharmacy is needing a prescription for needles. Sent for injection 4 times daily.     Endocrine Refill protocol for Glucose testing supplies     Protocol Criteria: PASSED Reason: N/A    If below requirement is met, send a 90-day supply with 1 refill per provider protocol.    Verify appointment with Endocrinology completed in the last 6 months or scheduled in the next 3 months.    Last completed office visit: 3/10/2025 Dania Mayorga DO   Next scheduled Follow up:   Future Appointments   Date Time Provider Department Center   6/3/2025  8:30 AM Domonique Murillo APN [facility]   9/10/2025  9:15 AM Dania Mayorga DO [id number]       Passed and sent needles per protocol.

## 2025-06-24 NOTE — TELEPHONE ENCOUNTER
Requesting triamcinolone 0.1 % External Cream   LOV: 09/16/24  RTC: 3-4 weeks  Last Relevant Labs:   Filled: 09/16/24 #60g with 0 refills    Future Appointments   Date Time Provider Department Center   9/10/2025  9:15 AM Dania Mayorga DO [id number]

## (undated) DIAGNOSIS — L65.9 HAIR LOSS: Primary | ICD-10-CM

## (undated) DIAGNOSIS — Z12.31 SCREENING MAMMOGRAM FOR BREAST CANCER: Primary | ICD-10-CM

## (undated) DIAGNOSIS — Z12.31 SCREENING MAMMOGRAM, ENCOUNTER FOR: ICD-10-CM

## (undated) DIAGNOSIS — E11.65 UNCONTROLLED TYPE 2 DIABETES MELLITUS WITH HYPERGLYCEMIA, WITHOUT LONG-TERM CURRENT USE OF INSULIN (HCC): ICD-10-CM

## (undated) NOTE — Clinical Note
Just an FYI - patient self discontinued her metformin, sounds like BG's are stable though - I updated the blue sticky with her diabetes med list

## (undated) NOTE — LETTER
06/29/20        [address] [place]      Dear Alba Duenas,    Our records indicate that you have outstanding lab work and or testing that was ordered for you and has not yet been completed: Stress Test - Please schedule te

## (undated) NOTE — LETTER
Patient Name: Lorrane Felty  Surgery Date: 6/15/2023  Medical Record: [id number]  CSN: [id number]    Surgeon(s):  MD Emerald Pinzon MD  Consent Procedure: Excision Right Submandibular Gland  Anesthesia Type: General    To Attending: Lynette Gamez MD  To PCP:  Dr. Brett Hood  To Other:     Kandis Benites REQUESTED THE FOLLOWING:  NOTE OF MEDICAL CLEARANCE  _____________________________________________________  Please note the following attached testing results for your review:   None

## (undated) NOTE — LETTER
Your patient was recently seen at the [facility] for a hospital follow-up visit. The visit note is attached. Please contact the clinic with any questions at [phone].     Thank you,  Qasim England, APRN

## (undated) NOTE — LETTER
03/16/20        [address] [place]      Dear Lizzy Valdivia,    Our records indicate that you have outstanding lab work and or testing that was ordered for you and has not yet been completed:      DEYVI BURK 2D+3D SCREENING BI

## (undated) NOTE — LETTER
06/08/21        [address] [place]      Dear Michelle Ivy,    Our records indicate that you have outstanding lab work and or testing that was ordered for you and has not yet been completed:  Orders Placed This Encounter

## (undated) NOTE — LETTER
OUTSIDE TESTING RESULT REQUEST     IMPORTANT: FOR YOUR IMMEDIATE ATTENTION  Please FAX all test results listed below to: [phone]     Testing already done on or about: 2023    * * * * If testing is NOT complete, arrange with patient A.S.A.P. * * * *      Patient Name: Betsy Snow  Surgery Date: 6/15/2023  Medical Record: [id number]  CSN: [id number]  : 3/2/1965 - A: 62 y     Sex: female  Surgeon(s):  MD Danielito Tate MD  Procedure: Excision Right Submandibular Gland  Anesthesia Type: General     Surgeon: Halina Carcamo MD     The following Testing and Time Line are REQUIRED PER ANESTHESIA     EKG READ AND SIGNED WITHIN   90 days  BMP (requires 4 hour fast) within  90 days      Thank You,   Sent by:ROSITA

## (undated) NOTE — LETTER
May 24, 2021      [address] [facility]      Dear Yaniv Schwartz: Our office has been trying to contact you to discuss your recent test results. Please contact our office at your earliest convenience at [phone].     Harley y

## (undated) NOTE — Clinical Note
Report in your box for review. Overall she's doing very well. Looks like she may need a reduction in long acting, BG's are dropping into the low 100s, even going below 70 consistently. Patient would also like direction on how to does for meals when BG is in range, a few doses have dropped her a little low when her BG starts under 150.   Potential scale outline on AGP

## (undated) NOTE — LETTER
06/01/21        [address] Tae Holloway,    Nuestros registros indican que tiene análisis clínicos pendientes y/o pruebas que se ordenaron en viveros nombre que no se ruiz completado    Mammograma

## (undated) NOTE — MR AVS SNAPSHOT
After Visit Summary   4/27/2017    Maurice Amend    MRN: [id number]           Diagnoses this Visit     Iron deficiency anemia, unspecified iron deficiency anemia type           Allergies     Penicillins Hives    Contrast Dye [Gadolinium Alroy Peat WBC 7.2  4.0-13.0  x10(3) uL Final    RBC 4.13  3.80-5.10  x10(6)uL Final    HGB 12.9  12.0-16.0  g/dL Final    HCT 38.8  34.0-50.0  % Final    .0  150.0-450.0  10(3)uL Final    MCV 93.9  81.0-100.0  fL Final    MCH 31.2  27.0-33.2  pg Final    [facility]

## (undated) NOTE — LETTER
08/05/21        [address] [place]      Dear David Reed,    Our records indicate that you have outstanding lab work and or testing that was ordered for you and has not yet been completed:  Orders Placed This Encounter